# Patient Record
Sex: FEMALE | Race: WHITE | NOT HISPANIC OR LATINO | Employment: UNEMPLOYED | URBAN - METROPOLITAN AREA
[De-identification: names, ages, dates, MRNs, and addresses within clinical notes are randomized per-mention and may not be internally consistent; named-entity substitution may affect disease eponyms.]

---

## 2017-01-03 ENCOUNTER — COMMUNICATION - HEALTHEAST (OUTPATIENT)
Dept: PALLIATIVE MEDICINE | Facility: CLINIC | Age: 37
End: 2017-01-03

## 2017-01-09 ENCOUNTER — AMBULATORY - HEALTHEAST (OUTPATIENT)
Dept: PALLIATIVE MEDICINE | Facility: OTHER | Age: 37
End: 2017-01-09

## 2017-01-11 ENCOUNTER — OFFICE VISIT - HEALTHEAST (OUTPATIENT)
Dept: FAMILY MEDICINE | Facility: CLINIC | Age: 37
End: 2017-01-11

## 2017-01-11 ENCOUNTER — COMMUNICATION - HEALTHEAST (OUTPATIENT)
Dept: PALLIATIVE MEDICINE | Facility: OTHER | Age: 37
End: 2017-01-11

## 2017-01-11 DIAGNOSIS — J02.9 SORE THROAT: ICD-10-CM

## 2017-01-11 DIAGNOSIS — J01.90 ACUTE SINUSITIS: ICD-10-CM

## 2017-01-12 ENCOUNTER — COMMUNICATION - HEALTHEAST (OUTPATIENT)
Dept: PALLIATIVE MEDICINE | Facility: OTHER | Age: 37
End: 2017-01-12

## 2017-01-12 DIAGNOSIS — R52 PAIN: ICD-10-CM

## 2017-01-16 ENCOUNTER — COMMUNICATION - HEALTHEAST (OUTPATIENT)
Dept: PALLIATIVE MEDICINE | Facility: OTHER | Age: 37
End: 2017-01-16

## 2017-01-16 ENCOUNTER — HOSPITAL ENCOUNTER (OUTPATIENT)
Dept: PALLIATIVE MEDICINE | Facility: OTHER | Age: 37
Discharge: HOME | End: 2017-01-16
Attending: NURSE PRACTITIONER

## 2017-01-16 DIAGNOSIS — Z3A.31 31 WEEKS GESTATION OF PREGNANCY: ICD-10-CM

## 2017-01-16 DIAGNOSIS — R68.84 JAW PAIN: ICD-10-CM

## 2017-01-16 DIAGNOSIS — G89.4 CHRONIC PAIN SYNDROME: ICD-10-CM

## 2017-01-16 DIAGNOSIS — R52 PAIN: ICD-10-CM

## 2017-01-16 DIAGNOSIS — M27.9 DISORDER OF JAW: ICD-10-CM

## 2017-01-16 ASSESSMENT — MIFFLIN-ST. JEOR: SCORE: 1370.35

## 2017-01-24 ENCOUNTER — COMMUNICATION - HEALTHEAST (OUTPATIENT)
Dept: PALLIATIVE MEDICINE | Facility: OTHER | Age: 37
End: 2017-01-24

## 2017-01-25 ENCOUNTER — COMMUNICATION - HEALTHEAST (OUTPATIENT)
Dept: PALLIATIVE MEDICINE | Facility: OTHER | Age: 37
End: 2017-01-25

## 2017-01-26 ENCOUNTER — COMMUNICATION - HEALTHEAST (OUTPATIENT)
Dept: PALLIATIVE MEDICINE | Facility: OTHER | Age: 37
End: 2017-01-26

## 2017-01-26 DIAGNOSIS — R52 PAIN: ICD-10-CM

## 2017-02-09 ENCOUNTER — COMMUNICATION - HEALTHEAST (OUTPATIENT)
Dept: PALLIATIVE MEDICINE | Facility: OTHER | Age: 37
End: 2017-02-09

## 2017-02-09 DIAGNOSIS — R52 PAIN: ICD-10-CM

## 2017-02-16 ENCOUNTER — HOSPITAL ENCOUNTER (OUTPATIENT)
Dept: PALLIATIVE MEDICINE | Facility: OTHER | Age: 37
Discharge: HOME | End: 2017-02-16
Attending: NURSE PRACTITIONER

## 2017-02-16 DIAGNOSIS — M27.9 DISORDER OF JAW: ICD-10-CM

## 2017-02-16 DIAGNOSIS — O09.93 HIGH-RISK PREGNANCY, THIRD TRIMESTER: ICD-10-CM

## 2017-02-16 DIAGNOSIS — M54.50 LOW BACK PAIN: ICD-10-CM

## 2017-02-16 DIAGNOSIS — R68.84 JAW PAIN: ICD-10-CM

## 2017-02-16 DIAGNOSIS — R52 PAIN: ICD-10-CM

## 2017-02-16 DIAGNOSIS — G89.4 CHRONIC PAIN SYNDROME: ICD-10-CM

## 2017-02-16 ASSESSMENT — MIFFLIN-ST. JEOR: SCORE: 1379.42

## 2017-02-24 ENCOUNTER — COMMUNICATION - HEALTHEAST (OUTPATIENT)
Dept: PALLIATIVE MEDICINE | Facility: OTHER | Age: 37
End: 2017-02-24

## 2017-02-24 ENCOUNTER — AMBULATORY - HEALTHEAST (OUTPATIENT)
Dept: PALLIATIVE MEDICINE | Facility: OTHER | Age: 37
End: 2017-02-24

## 2017-02-25 ENCOUNTER — RECORDS - HEALTHEAST (OUTPATIENT)
Dept: ADMINISTRATIVE | Facility: OTHER | Age: 37
End: 2017-02-25

## 2017-02-26 ENCOUNTER — RECORDS - HEALTHEAST (OUTPATIENT)
Dept: ADMINISTRATIVE | Facility: OTHER | Age: 37
End: 2017-02-26

## 2017-02-27 ENCOUNTER — RECORDS - HEALTHEAST (OUTPATIENT)
Dept: ADMINISTRATIVE | Facility: OTHER | Age: 37
End: 2017-02-27

## 2017-03-01 ENCOUNTER — COMMUNICATION - HEALTHEAST (OUTPATIENT)
Dept: PALLIATIVE MEDICINE | Facility: OTHER | Age: 37
End: 2017-03-01

## 2017-03-01 DIAGNOSIS — N20.0 RENAL CALCULI: ICD-10-CM

## 2017-03-07 ENCOUNTER — COMMUNICATION - HEALTHEAST (OUTPATIENT)
Dept: PALLIATIVE MEDICINE | Facility: OTHER | Age: 37
End: 2017-03-07

## 2017-03-07 DIAGNOSIS — R52 PAIN: ICD-10-CM

## 2017-03-08 ENCOUNTER — RECORDS - HEALTHEAST (OUTPATIENT)
Dept: ADMINISTRATIVE | Facility: OTHER | Age: 37
End: 2017-03-08

## 2017-03-09 ENCOUNTER — RECORDS - HEALTHEAST (OUTPATIENT)
Dept: ADMINISTRATIVE | Facility: OTHER | Age: 37
End: 2017-03-09

## 2017-03-14 ENCOUNTER — COMMUNICATION - HEALTHEAST (OUTPATIENT)
Dept: PALLIATIVE MEDICINE | Facility: OTHER | Age: 37
End: 2017-03-14

## 2017-03-14 DIAGNOSIS — R52 PAIN: ICD-10-CM

## 2017-03-20 ENCOUNTER — COMMUNICATION - HEALTHEAST (OUTPATIENT)
Dept: PALLIATIVE MEDICINE | Facility: OTHER | Age: 37
End: 2017-03-20

## 2017-03-21 ENCOUNTER — HOSPITAL ENCOUNTER (OUTPATIENT)
Dept: PALLIATIVE MEDICINE | Facility: OTHER | Age: 37
Discharge: HOME | End: 2017-03-21
Attending: NURSE PRACTITIONER

## 2017-03-21 DIAGNOSIS — R68.84 JAW PAIN: ICD-10-CM

## 2017-03-21 DIAGNOSIS — R52 PAIN: ICD-10-CM

## 2017-03-21 DIAGNOSIS — G89.4 CHRONIC PAIN SYNDROME: ICD-10-CM

## 2017-03-21 ASSESSMENT — MIFFLIN-ST. JEOR: SCORE: 1306.84

## 2017-03-23 ENCOUNTER — COMMUNICATION - HEALTHEAST (OUTPATIENT)
Dept: PALLIATIVE MEDICINE | Facility: CLINIC | Age: 37
End: 2017-03-23

## 2017-04-10 ENCOUNTER — COMMUNICATION - HEALTHEAST (OUTPATIENT)
Dept: PALLIATIVE MEDICINE | Facility: OTHER | Age: 37
End: 2017-04-10

## 2017-04-10 ENCOUNTER — COMMUNICATION - HEALTHEAST (OUTPATIENT)
Dept: PALLIATIVE MEDICINE | Facility: CLINIC | Age: 37
End: 2017-04-10

## 2017-04-10 DIAGNOSIS — R52 PAIN: ICD-10-CM

## 2017-04-14 ENCOUNTER — COMMUNICATION - HEALTHEAST (OUTPATIENT)
Dept: PALLIATIVE MEDICINE | Facility: OTHER | Age: 37
End: 2017-04-14

## 2017-04-18 ENCOUNTER — COMMUNICATION - HEALTHEAST (OUTPATIENT)
Dept: FAMILY MEDICINE | Facility: CLINIC | Age: 37
End: 2017-04-18

## 2017-04-21 ENCOUNTER — HOSPITAL ENCOUNTER (OUTPATIENT)
Dept: PALLIATIVE MEDICINE | Facility: OTHER | Age: 37
Discharge: HOME | End: 2017-04-21
Attending: NURSE PRACTITIONER

## 2017-04-21 DIAGNOSIS — M27.9 DISORDER OF JAW: ICD-10-CM

## 2017-04-21 DIAGNOSIS — G89.4 CHRONIC PAIN SYNDROME: ICD-10-CM

## 2017-04-21 DIAGNOSIS — R52 PAIN: ICD-10-CM

## 2017-04-21 ASSESSMENT — MIFFLIN-ST. JEOR: SCORE: 1329.52

## 2017-05-08 ENCOUNTER — COMMUNICATION - HEALTHEAST (OUTPATIENT)
Dept: PALLIATIVE MEDICINE | Facility: OTHER | Age: 37
End: 2017-05-08

## 2017-05-15 ENCOUNTER — COMMUNICATION - HEALTHEAST (OUTPATIENT)
Dept: PALLIATIVE MEDICINE | Facility: OTHER | Age: 37
End: 2017-05-15

## 2017-05-20 ENCOUNTER — COMMUNICATION - HEALTHEAST (OUTPATIENT)
Dept: FAMILY MEDICINE | Facility: CLINIC | Age: 37
End: 2017-05-20

## 2017-05-26 ENCOUNTER — COMMUNICATION - HEALTHEAST (OUTPATIENT)
Dept: FAMILY MEDICINE | Facility: CLINIC | Age: 37
End: 2017-05-26

## 2017-06-07 ENCOUNTER — RECORDS - HEALTHEAST (OUTPATIENT)
Dept: ADMINISTRATIVE | Facility: OTHER | Age: 37
End: 2017-06-07

## 2017-06-09 ENCOUNTER — HOSPITAL ENCOUNTER (OUTPATIENT)
Dept: PALLIATIVE MEDICINE | Facility: OTHER | Age: 37
Discharge: HOME | End: 2017-06-09
Attending: NURSE PRACTITIONER

## 2017-06-09 ENCOUNTER — RECORDS - HEALTHEAST (OUTPATIENT)
Dept: LAB | Facility: HOSPITAL | Age: 37
End: 2017-06-09

## 2017-06-09 DIAGNOSIS — R52 PAIN: ICD-10-CM

## 2017-06-09 DIAGNOSIS — G89.4 CHRONIC PAIN SYNDROME: ICD-10-CM

## 2017-06-09 DIAGNOSIS — M27.9 DISORDER OF JAW: ICD-10-CM

## 2017-06-09 DIAGNOSIS — M54.50 LOW BACK PAIN: ICD-10-CM

## 2017-06-09 ASSESSMENT — MIFFLIN-ST. JEOR: SCORE: 1329.52

## 2017-06-12 LAB — HBV SURFACE AB SERPL IA-ACNC: POSITIVE M[IU]/ML

## 2017-06-13 ENCOUNTER — COMMUNICATION - HEALTHEAST (OUTPATIENT)
Dept: PALLIATIVE MEDICINE | Facility: OTHER | Age: 37
End: 2017-06-13

## 2017-06-13 ENCOUNTER — COMMUNICATION - HEALTHEAST (OUTPATIENT)
Dept: PALLIATIVE MEDICINE | Facility: CLINIC | Age: 37
End: 2017-06-13

## 2017-06-13 DIAGNOSIS — M26.609 TMJ (TEMPOROMANDIBULAR JOINT DISORDER): ICD-10-CM

## 2017-07-03 ENCOUNTER — COMMUNICATION - HEALTHEAST (OUTPATIENT)
Dept: PALLIATIVE MEDICINE | Facility: OTHER | Age: 37
End: 2017-07-03

## 2017-07-05 ENCOUNTER — COMMUNICATION - HEALTHEAST (OUTPATIENT)
Dept: PALLIATIVE MEDICINE | Facility: OTHER | Age: 37
End: 2017-07-05

## 2017-07-06 ENCOUNTER — COMMUNICATION - HEALTHEAST (OUTPATIENT)
Dept: FAMILY MEDICINE | Facility: CLINIC | Age: 37
End: 2017-07-06

## 2017-07-06 ENCOUNTER — COMMUNICATION - HEALTHEAST (OUTPATIENT)
Dept: PALLIATIVE MEDICINE | Facility: OTHER | Age: 37
End: 2017-07-06

## 2017-07-09 ENCOUNTER — COMMUNICATION - HEALTHEAST (OUTPATIENT)
Dept: PALLIATIVE MEDICINE | Facility: OTHER | Age: 37
End: 2017-07-09

## 2017-07-10 ENCOUNTER — COMMUNICATION - HEALTHEAST (OUTPATIENT)
Dept: PALLIATIVE MEDICINE | Facility: OTHER | Age: 37
End: 2017-07-10

## 2017-07-10 ENCOUNTER — COMMUNICATION - HEALTHEAST (OUTPATIENT)
Dept: FAMILY MEDICINE | Facility: CLINIC | Age: 37
End: 2017-07-10

## 2017-07-10 DIAGNOSIS — G89.29 CHRONIC PAIN: ICD-10-CM

## 2017-07-11 ENCOUNTER — COMMUNICATION - HEALTHEAST (OUTPATIENT)
Dept: PALLIATIVE MEDICINE | Facility: OTHER | Age: 37
End: 2017-07-11

## 2017-07-11 DIAGNOSIS — G89.29 CHRONIC PAIN: ICD-10-CM

## 2017-07-12 ENCOUNTER — COMMUNICATION - HEALTHEAST (OUTPATIENT)
Dept: PALLIATIVE MEDICINE | Facility: CLINIC | Age: 37
End: 2017-07-12

## 2017-07-31 ENCOUNTER — HOSPITAL ENCOUNTER (OUTPATIENT)
Dept: PALLIATIVE MEDICINE | Facility: OTHER | Age: 37
Discharge: HOME | End: 2017-07-31
Attending: NURSE PRACTITIONER

## 2017-07-31 DIAGNOSIS — R68.84 JAW PAIN: ICD-10-CM

## 2017-07-31 DIAGNOSIS — M54.50 LOW BACK PAIN: ICD-10-CM

## 2017-07-31 DIAGNOSIS — R52 PAIN: ICD-10-CM

## 2017-07-31 DIAGNOSIS — G89.4 CHRONIC PAIN SYNDROME: ICD-10-CM

## 2017-07-31 DIAGNOSIS — M27.9 DISORDER OF JAW: ICD-10-CM

## 2017-07-31 ASSESSMENT — MIFFLIN-ST. JEOR: SCORE: 1338.6

## 2017-08-04 ENCOUNTER — COMMUNICATION - HEALTHEAST (OUTPATIENT)
Dept: NURSING | Facility: CLINIC | Age: 37
End: 2017-08-04

## 2017-08-09 ENCOUNTER — OFFICE VISIT - HEALTHEAST (OUTPATIENT)
Dept: INTERNAL MEDICINE | Facility: CLINIC | Age: 37
End: 2017-08-09

## 2017-08-09 ENCOUNTER — COMMUNICATION - HEALTHEAST (OUTPATIENT)
Dept: SCHEDULING | Facility: CLINIC | Age: 37
End: 2017-08-09

## 2017-08-09 DIAGNOSIS — R19.7 DIARRHEA: ICD-10-CM

## 2017-08-10 ENCOUNTER — COMMUNICATION - HEALTHEAST (OUTPATIENT)
Dept: FAMILY MEDICINE | Facility: CLINIC | Age: 37
End: 2017-08-10

## 2017-08-11 ENCOUNTER — OFFICE VISIT - HEALTHEAST (OUTPATIENT)
Dept: FAMILY MEDICINE | Facility: CLINIC | Age: 37
End: 2017-08-11

## 2017-08-11 DIAGNOSIS — R19.7 DIARRHEA: ICD-10-CM

## 2017-08-11 DIAGNOSIS — K52.9 GASTROENTERITIS: ICD-10-CM

## 2017-09-05 ENCOUNTER — COMMUNICATION - HEALTHEAST (OUTPATIENT)
Dept: PALLIATIVE MEDICINE | Facility: CLINIC | Age: 37
End: 2017-09-05

## 2017-09-05 DIAGNOSIS — R52 PAIN: ICD-10-CM

## 2017-09-08 ENCOUNTER — COMMUNICATION - HEALTHEAST (OUTPATIENT)
Dept: PALLIATIVE MEDICINE | Facility: OTHER | Age: 37
End: 2017-09-08

## 2017-09-11 ENCOUNTER — COMMUNICATION - HEALTHEAST (OUTPATIENT)
Dept: FAMILY MEDICINE | Facility: CLINIC | Age: 37
End: 2017-09-11

## 2017-10-05 ENCOUNTER — HOSPITAL ENCOUNTER (OUTPATIENT)
Dept: PALLIATIVE MEDICINE | Facility: OTHER | Age: 37
Discharge: HOME | End: 2017-10-05
Attending: NURSE PRACTITIONER

## 2017-10-05 DIAGNOSIS — M27.9 DISORDER OF JAW: ICD-10-CM

## 2017-10-05 DIAGNOSIS — R52 PAIN: ICD-10-CM

## 2017-10-05 ASSESSMENT — MIFFLIN-ST. JEOR: SCORE: 1322.44

## 2017-10-26 ENCOUNTER — COMMUNICATION - HEALTHEAST (OUTPATIENT)
Dept: PALLIATIVE MEDICINE | Facility: OTHER | Age: 37
End: 2017-10-26

## 2017-11-08 ENCOUNTER — COMMUNICATION - HEALTHEAST (OUTPATIENT)
Dept: FAMILY MEDICINE | Facility: CLINIC | Age: 37
End: 2017-11-08

## 2017-11-08 DIAGNOSIS — G89.29 CHRONIC PAIN: ICD-10-CM

## 2017-11-09 ENCOUNTER — COMMUNICATION - HEALTHEAST (OUTPATIENT)
Dept: PALLIATIVE MEDICINE | Facility: CLINIC | Age: 37
End: 2017-11-09

## 2017-11-10 ENCOUNTER — OFFICE VISIT - HEALTHEAST (OUTPATIENT)
Dept: FAMILY MEDICINE | Facility: CLINIC | Age: 37
End: 2017-11-10

## 2017-11-10 DIAGNOSIS — G89.29 CHRONIC PAIN: ICD-10-CM

## 2017-11-10 DIAGNOSIS — F33.9 RECURRENT MAJOR DEPRESSION (H): ICD-10-CM

## 2017-11-10 DIAGNOSIS — F41.9 ANXIETY: ICD-10-CM

## 2017-11-10 DIAGNOSIS — K13.79 MOUTH PAIN: ICD-10-CM

## 2017-11-12 ENCOUNTER — COMMUNICATION - HEALTHEAST (OUTPATIENT)
Dept: FAMILY MEDICINE | Facility: CLINIC | Age: 37
End: 2017-11-12

## 2017-11-13 ENCOUNTER — HOSPITAL ENCOUNTER (OUTPATIENT)
Dept: PALLIATIVE MEDICINE | Facility: OTHER | Age: 37
Discharge: HOME | End: 2017-11-13
Attending: NURSE PRACTITIONER

## 2017-11-13 DIAGNOSIS — R68.84 JAW PAIN: ICD-10-CM

## 2017-11-13 ASSESSMENT — MIFFLIN-ST. JEOR: SCORE: 1326.69

## 2017-11-16 ENCOUNTER — RECORDS - HEALTHEAST (OUTPATIENT)
Dept: ADMINISTRATIVE | Facility: OTHER | Age: 37
End: 2017-11-16

## 2017-11-20 ENCOUNTER — COMMUNICATION - HEALTHEAST (OUTPATIENT)
Dept: PALLIATIVE MEDICINE | Facility: OTHER | Age: 37
End: 2017-11-20

## 2017-11-20 DIAGNOSIS — R68.84 JAW PAIN: ICD-10-CM

## 2017-11-22 ENCOUNTER — COMMUNICATION - HEALTHEAST (OUTPATIENT)
Dept: PALLIATIVE MEDICINE | Facility: OTHER | Age: 37
End: 2017-11-22

## 2017-11-27 ENCOUNTER — COMMUNICATION - HEALTHEAST (OUTPATIENT)
Dept: PALLIATIVE MEDICINE | Facility: CLINIC | Age: 37
End: 2017-11-27

## 2017-11-27 ENCOUNTER — COMMUNICATION - HEALTHEAST (OUTPATIENT)
Dept: PALLIATIVE MEDICINE | Facility: OTHER | Age: 37
End: 2017-11-27

## 2017-11-27 DIAGNOSIS — R68.84 JAW PAIN: ICD-10-CM

## 2017-12-04 ENCOUNTER — HOSPITAL ENCOUNTER (OUTPATIENT)
Dept: PALLIATIVE MEDICINE | Facility: OTHER | Age: 37
Discharge: HOME | End: 2017-12-04
Attending: NURSE PRACTITIONER

## 2017-12-04 DIAGNOSIS — R68.84 JAW PAIN: ICD-10-CM

## 2017-12-04 ASSESSMENT — MIFFLIN-ST. JEOR: SCORE: 1290.12

## 2017-12-11 ENCOUNTER — COMMUNICATION - HEALTHEAST (OUTPATIENT)
Dept: FAMILY MEDICINE | Facility: CLINIC | Age: 37
End: 2017-12-11

## 2017-12-13 ENCOUNTER — COMMUNICATION - HEALTHEAST (OUTPATIENT)
Dept: FAMILY MEDICINE | Facility: CLINIC | Age: 37
End: 2017-12-13

## 2017-12-21 ENCOUNTER — COMMUNICATION - HEALTHEAST (OUTPATIENT)
Dept: FAMILY MEDICINE | Facility: CLINIC | Age: 37
End: 2017-12-21

## 2017-12-21 DIAGNOSIS — R53.83 FATIGUE: ICD-10-CM

## 2017-12-22 ENCOUNTER — HOSPITAL ENCOUNTER (EMERGENCY)
Facility: CLINIC | Age: 37
Discharge: HOME | End: 2017-12-22
Attending: EMERGENCY MEDICINE | Admitting: EMERGENCY MEDICINE
Payer: COMMERCIAL

## 2017-12-22 ENCOUNTER — RECORDS - HEALTHEAST (OUTPATIENT)
Dept: ADMINISTRATIVE | Facility: OTHER | Age: 37
End: 2017-12-22

## 2017-12-22 ENCOUNTER — COMMUNICATION - HEALTHEAST (OUTPATIENT)
Dept: SCHEDULING | Facility: CLINIC | Age: 37
End: 2017-12-22

## 2017-12-22 VITALS
TEMPERATURE: 97.9 F | DIASTOLIC BLOOD PRESSURE: 77 MMHG | OXYGEN SATURATION: 98 % | HEART RATE: 85 BPM | RESPIRATION RATE: 20 BRPM | SYSTOLIC BLOOD PRESSURE: 118 MMHG

## 2017-12-22 DIAGNOSIS — O03.9 SPONTANEOUS MISCARRIAGE: ICD-10-CM

## 2017-12-22 PROCEDURE — 99283 EMERGENCY DEPT VISIT LOW MDM: CPT | Mod: Z6 | Performed by: EMERGENCY MEDICINE

## 2017-12-22 PROCEDURE — 25000132 ZZH RX MED GY IP 250 OP 250 PS 637: Performed by: EMERGENCY MEDICINE

## 2017-12-22 PROCEDURE — 96372 THER/PROPH/DIAG INJ SC/IM: CPT | Performed by: EMERGENCY MEDICINE

## 2017-12-22 PROCEDURE — 25000128 H RX IP 250 OP 636: Performed by: EMERGENCY MEDICINE

## 2017-12-22 PROCEDURE — 99284 EMERGENCY DEPT VISIT MOD MDM: CPT | Performed by: EMERGENCY MEDICINE

## 2017-12-22 RX ORDER — KETOROLAC TROMETHAMINE 30 MG/ML
60 INJECTION, SOLUTION INTRAMUSCULAR; INTRAVENOUS ONCE
Status: COMPLETED | OUTPATIENT
Start: 2017-12-22 | End: 2017-12-22

## 2017-12-22 RX ORDER — MISOPROSTOL 200 UG/1
600 TABLET ORAL ONCE
Status: COMPLETED | OUTPATIENT
Start: 2017-12-22 | End: 2017-12-22

## 2017-12-22 RX ADMIN — KETOROLAC TROMETHAMINE 60 MG: 30 INJECTION, SOLUTION INTRAMUSCULAR at 21:22

## 2017-12-22 RX ADMIN — MISOPROSTOL 600 MCG: 200 TABLET ORAL at 21:42

## 2017-12-22 NOTE — ED AVS SNAPSHOT
[facility] Emergency Department    [address] [address]    Phone:  [phone]    Fax:  [phone]                                       Lizzy Tom   MRN: [id number]    Department:  [facility] Emergency Department   Date of Visit:  12/22/2017           Patient Information     Date Of Birth          1980        Your diagnoses for this visit were:     Spontaneous miscarriage        You were seen by Mekhi Stringer DO.        Discharge Instructions       Monitor for vaginal bleeding  Return for heavy heavy bleeding or passage of large clots  No tampon use  Drink water to stay hydrated  Return tomorrow afternoon for recheck    24 Hour Appointment Hotline       To make an appointment at any [facility] clinic, call [phone] ([phone]). If you don't have a family doctor or clinic, we will help you find one. [facility] clinics are conveniently located to serve the needs of you and your family.             Review of your medicines      Notice     You have not been prescribed any medications.            Orders Needing Specimen Collection     None      Pending Results     No orders found from 12/20/2017 to 12/23/2017.            Pending Culture Results     No orders found from 12/20/2017 to 12/23/2017.            Pending Results Instructions     If you had any lab results that were not finalized at the time of your Discharge, you can call the ED Lab Result RN at [phone]. You will be contacted by this team for any positive Lab results or changes in treatment. The nurses are available 7 days a week from 10A to 6:30P.  You can leave a message 24 hours per day and they will return your call.        Test Results From Your Hospital Stay               Thank you for choosing [facility]       Thank you for choosing [facility] for your care. Our goal is always to provide you with excellent care. Hearing back from our patients is one way we can continue to improve our  "services. Please take a few minutes to complete the written survey that you may receive in the mail after you visit with us. Thank you!        [employer] Information     [employer] lets you send messages to your doctor, view your test results, renew your prescriptions, schedule appointments and more. To sign up, go to [email or web address] . Click on \"Log in\" on the left side of the screen, which will take you to the Welcome page. Then click on \"Sign up Now\" on the right side of the page.     You will be asked to enter the access code listed below, as well as some personal information. Please follow the directions to create your username and password.     Your access code is: [email or web address]  Expires: 3/22/2018 10:13 PM     Your access code will  in 90 days. If you need help or a new code, please call your [facility] clinic or [phone].        Care EveryWhere ID     This is your Care EveryWhere ID. This could be used by other organizations to access your [facility] medical records  [id number]        Equal Access to Services     [facility] Hospital[facility]OREN : Hadii prince Mclaughlin, waalma rosada lumiky, qaybta kaaljesus rivas, cat hernández. So [facility] [phone].    ATENCIÓN: Si habla español, tiene a sierra disposición servicios gratuitos de asistencia lingüística. [place] al [phone].    We comply with applicable federal civil rights laws and [place] laws. We do not discriminate on the basis of race, color, national origin, age, disability, sex, sexual orientation, or gender identity.            After Visit Summary       This is your record. Keep this with you and show to your community pharmacist(s) and doctor(s) at your next visit.                  "

## 2017-12-22 NOTE — ED AVS SNAPSHOT
[facility] Emergency Department    [address] [address]    Phone:  [phone]    Fax:  [phone]                                       Lizzy Tom   MRN: [id number]    Department:  [facility] Emergency Department   Date of Visit:  12/22/2017           After Visit Summary Signature Page     I have received my discharge instructions, and my questions have been answered. I have discussed any challenges I see with this plan with the nurse or doctor.    ..........................................................................................................................................  Patient/Patient Representative Signature      ..........................................................................................................................................  Patient Representative Print Name and Relationship to Patient    ..................................................               ................................................  Date                                            Time    ..........................................................................................................................................  Reviewed by Signature/Title    ...................................................              ..............................................  Date                                                            Time

## 2017-12-23 ENCOUNTER — HOSPITAL ENCOUNTER (EMERGENCY)
Facility: CLINIC | Age: 37
Discharge: HOME | End: 2017-12-23
Attending: EMERGENCY MEDICINE | Admitting: EMERGENCY MEDICINE
Payer: COMMERCIAL

## 2017-12-23 ENCOUNTER — RECORDS - HEALTHEAST (OUTPATIENT)
Dept: ADMINISTRATIVE | Facility: OTHER | Age: 37
End: 2017-12-23

## 2017-12-23 VITALS
RESPIRATION RATE: 18 BRPM | DIASTOLIC BLOOD PRESSURE: 59 MMHG | OXYGEN SATURATION: 100 % | TEMPERATURE: 97 F | SYSTOLIC BLOOD PRESSURE: 111 MMHG | WEIGHT: 130 LBS | HEART RATE: 57 BPM

## 2017-12-23 DIAGNOSIS — O03.9 SPONTANEOUS ABORTION IN FIRST TRIMESTER: ICD-10-CM

## 2017-12-23 PROCEDURE — 99212 OFFICE O/P EST SF 10 MIN: CPT | Performed by: EMERGENCY MEDICINE

## 2017-12-23 PROCEDURE — 99213 OFFICE O/P EST LOW 20 MIN: CPT | Mod: Z6 | Performed by: EMERGENCY MEDICINE

## 2017-12-23 NOTE — ED AVS SNAPSHOT
[facility] Emergency Department    [address] [address]    Phone:  [phone]    Fax:  [phone]                                       Lizzy Tom   MRN: [id number]    Department:  [facility] Emergency Department   Date of Visit:  2017           Patient Information     Date Of Birth          1980        Your diagnoses for this visit were:     Spontaneous  in first trimester        You were seen by Mekhi Stringer DO.        Discharge Instructions       Miscarriage appears to be complete  Continue monitoring for:( if these symptoms are noted please return for reassessment to the clinic or emergency department)  Fever  Increasing lower abdominal pain  Malodorous discharge from vagina  Heavy bleeding    24 Hour Appointment Hotline       To make an appointment at any [facility] clinic, call [phone] ([phone]). If you don't have a family doctor or clinic, we will help you find one. [facility] clinics are conveniently located to serve the needs of you and your family.             Review of your medicines      Notice     You have not been prescribed any medications.            Orders Needing Specimen Collection     None      Pending Results     No orders found from 2017 to 2017.            Pending Culture Results     No orders found from 2017 to 2017.            Pending Results Instructions     If you had any lab results that were not finalized at the time of your Discharge, you can call the ED Lab Result RN at [phone]. You will be contacted by this team for any positive Lab results or changes in treatment. The nurses are available 7 days a week from 10A to 6:30P.  You can leave a message 24 hours per day and they will return your call.        Test Results From Your Hospital Stay               Thank you for choosing [facility]       Thank you for choosing [facility] for your care. Our goal is always to provide you with  "excellent care. Hearing back from our patients is one way we can continue to improve our services. Please take a few minutes to complete the written survey that you may receive in the mail after you visit with us. Thank you!        [employer] Information     [employer] lets you send messages to your doctor, view your test results, renew your prescriptions, schedule appointments and more. To sign up, go to [email or web address] . Click on \"Log in\" on the left side of the screen, which will take you to the Welcome page. Then click on \"Sign up Now\" on the right side of the page.     You will be asked to enter the access code listed below, as well as some personal information. Please follow the directions to create your username and password.     Your access code is: [email or web address]  Expires: 3/22/2018 10:13 PM     Your access code will  in 90 days. If you need help or a new code, please call your [place] clinic or [phone].        Care EveryWhere ID     This is your Care EveryWhere ID. This could be used by other organizations to access your [place] medical records  [id number]        Equal Access to Services     ANJEL GRIGGS AH: Hadii prince Mclaughlin, waevan lucas, qaarnold kaalmaefraín rivas, cat hernández. So [facility] [phone].    ATENCIÓN: Si habla español, tiene a sierra disposición servicios gratuitos de asistencia lingüística. [place] al [phone].    We comply with applicable federal civil rights laws and [place] laws. We do not discriminate on the basis of race, color, national origin, age, disability, sex, sexual orientation, or gender identity.            After Visit Summary       This is your record. Keep this with you and show to your community pharmacist(s) and doctor(s) at your next visit.                  "

## 2017-12-23 NOTE — ED PROVIDER NOTES
History     Chief Complaint   Patient presents with     Miscarriage     recheck with dr posada     HPI         Lizzy Tom is a 37 year old female who  re-presents for reassessment of spontaneous /miscarriage.  Patient is not aware of her last menstrual period.  Pregnancy confirmed at home with UPT testing.  On oral contraceptive.  Patient arrived to the ED last evening after expulsion of placenta and fetus.  Estimated gestation was 12-13 weeks..    Patient presented with no hemorrhaging.  She was not orthostatic vital signs were stable.  Patient was treated with misoprostol 600 mcg oral dose.    Since discharge home she has had cramping shortly after the misoprostol which is now subsided.  She is only on her second hygiene pad and states she is not having any bleeding.  No fever.  No abdominal discomfort.      Problem List:    There are no active problems to display for this patient.       Past Medical History:    No past medical history on file.    Past Surgical History:    No past surgical history on file.    Family History:    No family history on file.    Social History:  Marital Status:  Single [1]  Social History   Substance Use Topics     Smoking status: Not on file     Smokeless tobacco: Not on file     Alcohol use Not on file        Medications:      No current outpatient prescriptions on file.      Review of Systems  All pertinent positives and negatives are documented in the HPI.  All others reviewed and are negative .    Physical Exam   BP: 111/59  Pulse: 57  Temp: 97  F (36.1  C)  Resp: 18  Weight: 59 kg (130 lb)  SpO2: 100 %      Physical Exam  Vital signs stable  Patient is not orthostatic  Skin color and tone is normal  Abdomen is nontender  Uterus palpates firm, nontender and measures only 8 cm.  Scant vaginal bleeding is noted.    ED Course     ED Course     Procedures                       Assessments & Plan (with Medical Decision Making)  Spontaneous AB 12-13 weeks gestation  estimated.  Medical recheck with patient doing well.  Patient given a list of symptoms to return be concerning for infection return of bleeding.  Eyes to follow-up with primary clinic provider in the next 2 weeks.  May restart oral contraceptive therapy in 7 days.    Patient was switched from ED status to urgent care status for billing.       I have reviewed the nursing notes.    I have reviewed the findings, diagnosis, plan and need for follow up with the patient.      New Prescriptions    No medications on file       Final diagnoses:   Spontaneous  in first trimester       2017   [facility] EMERGENCY DEPARTMENT     Mekhi Stringer,   17 1638

## 2017-12-23 NOTE — ED AVS SNAPSHOT
[facility] Emergency Department    [address] [address]    Phone:  [phone]    Fax:  [phone]                                       Lizzy Tom   MRN: [id number]    Department:  [facility] Emergency Department   Date of Visit:  12/23/2017           After Visit Summary Signature Page     I have received my discharge instructions, and my questions have been answered. I have discussed any challenges I see with this plan with the nurse or doctor.    ..........................................................................................................................................  Patient/Patient Representative Signature      ..........................................................................................................................................  Patient Representative Print Name and Relationship to Patient    ..................................................               ................................................  Date                                            Time    ..........................................................................................................................................  Reviewed by Signature/Title    ...................................................              ..............................................  Date                                                            Time

## 2017-12-23 NOTE — ED PROVIDER NOTES
History     Chief Complaint   Patient presents with     Vaginal Bleeding     HPI  Lizzy Tom is a 37 year old female    002  Presents with abdominal cramping, passage of products of conception.  Brought expelled POC in for inspection.  Unknown last menstruation  Has been on oral contraceptive  Irregular menses  Cramping resolved with passing POC  No fever  Small amount of continued vaginal bleeding  Not lightheaded or dizzy  No shortness of breath    Problem List:    There are no active problems to display for this patient.       Past Medical History:    No past medical history on file.    Past Surgical History:    No past surgical history on file.    Family History:    No family history on file.    Social History:  Marital Status:    Social History   Substance Use Topics     Smoking status: Not on file     Smokeless tobacco: Not on file     Alcohol use Not on file        Medications:      No current outpatient prescriptions on file.      Review of Systems  All pertinent positives and negatives are documented in the HPI.  All others reviewed and are negative .    Physical Exam   BP: 118/77  Pulse: 85  Temp: 97.9  F (36.6  C)  Resp: 20  SpO2: 98 %      Physical Exam  Vital signs reviewed  Patient not orthostatic  Skin color tone looks normal  Lungs are clear to auscultation  Heart is regular without ectopy or murmur  Abdomen: No distention.  Nontender.  No suprapubic tenderness.  Pelvic: No uterine pain.  Palpates 11 cm.  Cervix is dilated to 2 cm.  Small clots in the central cervix.  Scant bleeding.  No signs for any residual product of conception.  Suctioned out small clot.    ED Course     ED Course     Procedures  Contents of bag brought in by patient:  Inspection shows product of conception.  Placental tissue.  Fetus measuring 2.5 cm.  The placental tissue margins appear to be all intact and accounted for                       Assessments & Plan (with Medical Decision Making)  37-year-old   female presents with known pregnancy.  Pregnancy test was +1 week ago.  No ideas to last LMP.  Has been on oral contraceptive.  Menstruations are very irregular.  Prior to arrival expelled product of conception.  Present with scant vaginal bleeding and no abdominal cramping or pain.  Patient is not orthostatic/ vital signs stable.  Patient is afebrile.  No fever.  No purulent discharge.  Suspect complete first trimester miscarriage/spontaneous AB.  Plan:   Misoprostol 600 mcg single oral dose.  Recheck ED 24 hours.  Return sooner if she has any heavy bleeding.  Treating with misoprostol versus expectant/observational management has been shown to reduce hospitalization/surgical care needs.   10:16 PM  No further vaginal bleeding  Discharge home     I have reviewed the nursing notes.    I have reviewed the findings, diagnosis, plan and need for follow up with the patient.      New Prescriptions    No medications on file       Final diagnoses:   Spontaneous miscarriage       12/22/2017   [facility] EMERGENCY DEPARTMENT     Mekhi Stringer DO  12/22/17 1377

## 2017-12-23 NOTE — DISCHARGE INSTRUCTIONS
Monitor for vaginal bleeding  Return for heavy heavy bleeding or passage of large clots  No tampon use  Drink water to stay hydrated  Return tomorrow afternoon for recheck

## 2017-12-23 NOTE — DISCHARGE INSTRUCTIONS
Miscarriage appears to be complete  Continue monitoring for:( if these symptoms are noted please return for reassessment to the clinic or emergency department)  Fever  Increasing lower abdominal pain  Malodorous discharge from vagina  Heavy bleeding

## 2018-01-02 ENCOUNTER — COMMUNICATION - HEALTHEAST (OUTPATIENT)
Dept: PALLIATIVE MEDICINE | Facility: OTHER | Age: 38
End: 2018-01-02

## 2018-01-02 DIAGNOSIS — R68.84 JAW PAIN: ICD-10-CM

## 2018-01-04 ENCOUNTER — HOSPITAL ENCOUNTER (OUTPATIENT)
Dept: PALLIATIVE MEDICINE | Facility: OTHER | Age: 38
Discharge: HOME | End: 2018-01-04
Attending: NURSE PRACTITIONER

## 2018-01-04 DIAGNOSIS — R68.84 JAW PAIN: ICD-10-CM

## 2018-01-04 ASSESSMENT — MIFFLIN-ST. JEOR: SCORE: 1290.12

## 2018-01-12 ENCOUNTER — COMMUNICATION - HEALTHEAST (OUTPATIENT)
Dept: PALLIATIVE MEDICINE | Facility: OTHER | Age: 38
End: 2018-01-12

## 2018-01-12 DIAGNOSIS — R68.84 JAW PAIN: ICD-10-CM

## 2018-01-18 ENCOUNTER — COMMUNICATION - HEALTHEAST (OUTPATIENT)
Dept: PALLIATIVE MEDICINE | Facility: CLINIC | Age: 38
End: 2018-01-18

## 2018-01-18 ENCOUNTER — COMMUNICATION - HEALTHEAST (OUTPATIENT)
Dept: PALLIATIVE MEDICINE | Facility: OTHER | Age: 38
End: 2018-01-18

## 2018-01-18 DIAGNOSIS — R68.84 JAW PAIN: ICD-10-CM

## 2018-02-02 ENCOUNTER — COMMUNICATION - HEALTHEAST (OUTPATIENT)
Dept: PALLIATIVE MEDICINE | Facility: OTHER | Age: 38
End: 2018-02-02

## 2018-02-02 DIAGNOSIS — R68.84 JAW PAIN: ICD-10-CM

## 2018-02-09 ENCOUNTER — COMMUNICATION - HEALTHEAST (OUTPATIENT)
Dept: PALLIATIVE MEDICINE | Facility: OTHER | Age: 38
End: 2018-02-09

## 2018-02-09 ENCOUNTER — HOSPITAL ENCOUNTER (OUTPATIENT)
Dept: PALLIATIVE MEDICINE | Facility: OTHER | Age: 38
Discharge: HOME | End: 2018-02-09
Attending: NURSE PRACTITIONER

## 2018-02-09 DIAGNOSIS — R68.84 JAW PAIN: ICD-10-CM

## 2018-02-09 ASSESSMENT — MIFFLIN-ST. JEOR: SCORE: 1290.12

## 2018-02-13 ENCOUNTER — COMMUNICATION - HEALTHEAST (OUTPATIENT)
Dept: PALLIATIVE MEDICINE | Facility: OTHER | Age: 38
End: 2018-02-13

## 2018-02-22 ENCOUNTER — COMMUNICATION - HEALTHEAST (OUTPATIENT)
Dept: PALLIATIVE MEDICINE | Facility: OTHER | Age: 38
End: 2018-02-22

## 2018-02-22 DIAGNOSIS — R68.84 JAW PAIN: ICD-10-CM

## 2018-03-12 ENCOUNTER — HOSPITAL ENCOUNTER (OUTPATIENT)
Dept: PALLIATIVE MEDICINE | Facility: OTHER | Age: 38
Discharge: HOME | End: 2018-03-12
Attending: NURSE PRACTITIONER

## 2018-03-12 DIAGNOSIS — R68.84 JAW PAIN: ICD-10-CM

## 2018-03-12 DIAGNOSIS — M79.10 MYALGIA: ICD-10-CM

## 2018-03-12 DIAGNOSIS — M26.609 TMJ (TEMPOROMANDIBULAR JOINT DISORDER): ICD-10-CM

## 2018-03-12 DIAGNOSIS — K08.89 CHEWING DIFFICULTY: ICD-10-CM

## 2018-03-12 DIAGNOSIS — R51.9 HEAD AND FACE PAIN: ICD-10-CM

## 2018-03-12 ASSESSMENT — MIFFLIN-ST. JEOR: SCORE: 1290.12

## 2018-03-30 ENCOUNTER — COMMUNICATION - HEALTHEAST (OUTPATIENT)
Dept: PALLIATIVE MEDICINE | Facility: OTHER | Age: 38
End: 2018-03-30

## 2018-04-03 ENCOUNTER — RECORDS - HEALTHEAST (OUTPATIENT)
Dept: LAB | Facility: CLINIC | Age: 38
End: 2018-04-03

## 2018-04-03 ENCOUNTER — RECORDS - HEALTHEAST (OUTPATIENT)
Dept: ADMINISTRATIVE | Facility: OTHER | Age: 38
End: 2018-04-03

## 2018-04-03 LAB
AMPHETAMINES UR QL SCN: ABNORMAL
BARBITURATES UR QL: ABNORMAL
BENZODIAZ UR QL: ABNORMAL
CANNABINOIDS UR QL SCN: ABNORMAL
COCAINE UR QL: ABNORMAL
CREAT UR-MCNC: 93.4 MG/DL
METHADONE UR QL SCN: ABNORMAL
OPIATES UR QL SCN: ABNORMAL
OXYCODONE UR QL: ABNORMAL
PCP UR QL SCN: ABNORMAL

## 2018-04-04 ENCOUNTER — COMMUNICATION - HEALTHEAST (OUTPATIENT)
Dept: PALLIATIVE MEDICINE | Facility: OTHER | Age: 38
End: 2018-04-04

## 2018-04-04 DIAGNOSIS — R68.84 JAW PAIN: ICD-10-CM

## 2018-04-04 LAB
HBV SURFACE AB SERPL IA-ACNC: POSITIVE M[IU]/ML
RUBV IGG SERPL QL IA: POSITIVE
VZV IGG SER QL IA: POSITIVE

## 2018-04-05 ENCOUNTER — COMMUNICATION - HEALTHEAST (OUTPATIENT)
Dept: FAMILY MEDICINE | Facility: CLINIC | Age: 38
End: 2018-04-05

## 2018-04-05 ENCOUNTER — COMMUNICATION - HEALTHEAST (OUTPATIENT)
Dept: PALLIATIVE MEDICINE | Facility: OTHER | Age: 38
End: 2018-04-05

## 2018-04-09 ENCOUNTER — COMMUNICATION - HEALTHEAST (OUTPATIENT)
Dept: PALLIATIVE MEDICINE | Facility: OTHER | Age: 38
End: 2018-04-09

## 2018-04-09 DIAGNOSIS — R68.84 JAW PAIN: ICD-10-CM

## 2018-04-16 ENCOUNTER — COMMUNICATION - HEALTHEAST (OUTPATIENT)
Dept: PALLIATIVE MEDICINE | Facility: OTHER | Age: 38
End: 2018-04-16

## 2018-04-20 ENCOUNTER — COMMUNICATION - HEALTHEAST (OUTPATIENT)
Dept: PALLIATIVE MEDICINE | Facility: OTHER | Age: 38
End: 2018-04-20

## 2018-04-20 DIAGNOSIS — R68.84 JAW PAIN: ICD-10-CM

## 2018-04-30 ENCOUNTER — OFFICE VISIT - HEALTHEAST (OUTPATIENT)
Dept: FAMILY MEDICINE | Facility: CLINIC | Age: 38
End: 2018-04-30

## 2018-04-30 DIAGNOSIS — K13.79 MOUTH PAIN: ICD-10-CM

## 2018-04-30 DIAGNOSIS — G89.29 CHRONIC TMJ PAIN: ICD-10-CM

## 2018-04-30 DIAGNOSIS — K08.89 DIFFICULTY CHEWING: ICD-10-CM

## 2018-04-30 DIAGNOSIS — F11.20 CHRONIC NARCOTIC DEPENDENCE (H): ICD-10-CM

## 2018-04-30 DIAGNOSIS — S69.91XA INJURY OF FINGER OF RIGHT HAND, INITIAL ENCOUNTER: ICD-10-CM

## 2018-04-30 DIAGNOSIS — G89.29 CHRONIC PAIN: ICD-10-CM

## 2018-04-30 DIAGNOSIS — M26.629 CHRONIC TMJ PAIN: ICD-10-CM

## 2018-04-30 DIAGNOSIS — R68.84 JAW PAIN: ICD-10-CM

## 2018-04-30 ASSESSMENT — MIFFLIN-ST. JEOR: SCORE: 1279.91

## 2018-05-25 ENCOUNTER — OFFICE VISIT - HEALTHEAST (OUTPATIENT)
Dept: FAMILY MEDICINE | Facility: CLINIC | Age: 38
End: 2018-05-25

## 2018-05-25 DIAGNOSIS — R59.1 LYMPHADENOPATHY OF HEAD AND NECK: ICD-10-CM
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
## 2018-05-25 DIAGNOSIS — G89.29 CHRONIC TMJ PAIN: ICD-10-CM

## 2018-05-25 DIAGNOSIS — F33.9 RECURRENT MAJOR DEPRESSION (H): ICD-10-CM

## 2018-05-25 DIAGNOSIS — R68.84 JAW PAIN: ICD-10-CM

## 2018-05-25 DIAGNOSIS — M26.629 CHRONIC TMJ PAIN: ICD-10-CM

## 2018-05-25 LAB
ERYTHROCYTE [DISTWIDTH] IN BLOOD BY AUTOMATED COUNT: 12.8 % (ref 11–14.5)
HCT VFR BLD AUTO: 41 % (ref 35–47)
HGB BLD-MCNC: 13.8 G/DL (ref 12–16)
MCH RBC QN AUTO: 29.1 PG (ref 27–34)
MCHC RBC AUTO-ENTMCNC: 33.7 G/DL (ref 32–36)
MCV RBC AUTO: 86 FL (ref 80–100)
PLATELET # BLD AUTO: 283 THOU/UL (ref 140–440)
PMV BLD AUTO: 7.4 FL (ref 7–10)
RBC # BLD AUTO: 4.75 MILL/UL (ref 3.8–5.4)
WBC: 9.7 THOU/UL (ref 4–11)

## 2018-05-29 LAB — B BURGDOR IGG+IGM SER QL: 0.06 INDEX VALUE

## 2018-06-23 ENCOUNTER — COMMUNICATION - HEALTHEAST (OUTPATIENT)
Dept: FAMILY MEDICINE | Facility: CLINIC | Age: 38
End: 2018-06-23

## 2018-06-27 ENCOUNTER — COMMUNICATION - HEALTHEAST (OUTPATIENT)
Dept: FAMILY MEDICINE | Facility: CLINIC | Age: 38
End: 2018-06-27

## 2018-07-02 ENCOUNTER — COMMUNICATION - HEALTHEAST (OUTPATIENT)
Dept: PALLIATIVE MEDICINE | Facility: CLINIC | Age: 38
End: 2018-07-02

## 2018-07-21 ENCOUNTER — COMMUNICATION - HEALTHEAST (OUTPATIENT)
Dept: FAMILY MEDICINE | Facility: CLINIC | Age: 38
End: 2018-07-21

## 2018-08-13 ENCOUNTER — RECORDS - HEALTHEAST (OUTPATIENT)
Dept: ADMINISTRATIVE | Facility: OTHER | Age: 38
End: 2018-08-13

## 2018-08-14 ENCOUNTER — RECORDS - HEALTHEAST (OUTPATIENT)
Dept: ADMINISTRATIVE | Facility: OTHER | Age: 38
End: 2018-08-14

## 2018-09-21 ENCOUNTER — RECORDS - HEALTHEAST (OUTPATIENT)
Dept: ADMINISTRATIVE | Facility: OTHER | Age: 38
End: 2018-09-21

## 2018-09-22 ENCOUNTER — RECORDS - HEALTHEAST (OUTPATIENT)
Dept: LAB | Facility: CLINIC | Age: 38
End: 2018-09-22

## 2018-09-23 ENCOUNTER — RECORDS - HEALTHEAST (OUTPATIENT)
Dept: LAB | Facility: CLINIC | Age: 38
End: 2018-09-23

## 2018-09-23 LAB
SHIGA TOXIN 1: NEGATIVE
SHIGA TOXIN 2: NEGATIVE
WBC STL QL MICRO: NORMAL

## 2018-09-24 LAB
BACTERIA SPEC CULT: ABNORMAL
BACTERIA SPEC CULT: ABNORMAL
O+P STL MICRO: NORMAL

## 2018-09-26 LAB — BACTERIA SPEC CULT: ABNORMAL

## 2018-10-08 ENCOUNTER — RECORDS - HEALTHEAST (OUTPATIENT)
Dept: ADMINISTRATIVE | Facility: OTHER | Age: 38
End: 2018-10-08

## 2018-11-26 ENCOUNTER — RECORDS - HEALTHEAST (OUTPATIENT)
Dept: ADMINISTRATIVE | Facility: OTHER | Age: 38
End: 2018-11-26

## 2018-12-07 ENCOUNTER — RECORDS - HEALTHEAST (OUTPATIENT)
Dept: ADMINISTRATIVE | Facility: OTHER | Age: 38
End: 2018-12-07

## 2018-12-19 ENCOUNTER — RECORDS - HEALTHEAST (OUTPATIENT)
Dept: ADMINISTRATIVE | Facility: OTHER | Age: 38
End: 2018-12-19

## 2018-12-21 ENCOUNTER — RECORDS - HEALTHEAST (OUTPATIENT)
Dept: ADMINISTRATIVE | Facility: OTHER | Age: 38
End: 2018-12-21

## 2019-01-04 ENCOUNTER — RECORDS - HEALTHEAST (OUTPATIENT)
Dept: ADMINISTRATIVE | Facility: OTHER | Age: 39
End: 2019-01-04

## 2019-01-18 ENCOUNTER — RECORDS - HEALTHEAST (OUTPATIENT)
Dept: ADMINISTRATIVE | Facility: OTHER | Age: 39
End: 2019-01-18

## 2019-02-13 ENCOUNTER — RECORDS - HEALTHEAST (OUTPATIENT)
Dept: ADMINISTRATIVE | Facility: OTHER | Age: 39
End: 2019-02-13

## 2019-02-28 ENCOUNTER — RECORDS - HEALTHEAST (OUTPATIENT)
Dept: ADMINISTRATIVE | Facility: OTHER | Age: 39
End: 2019-02-28

## 2019-03-22 ENCOUNTER — RECORDS - HEALTHEAST (OUTPATIENT)
Dept: ADMINISTRATIVE | Facility: OTHER | Age: 39
End: 2019-03-22

## 2019-04-08 ENCOUNTER — RECORDS - HEALTHEAST (OUTPATIENT)
Dept: ADMINISTRATIVE | Facility: OTHER | Age: 39
End: 2019-04-08

## 2019-04-13 ENCOUNTER — COMMUNICATION - HEALTHEAST (OUTPATIENT)
Dept: FAMILY MEDICINE | Facility: CLINIC | Age: 39
End: 2019-04-13

## 2019-04-17 ENCOUNTER — COMMUNICATION - HEALTHEAST (OUTPATIENT)
Dept: FAMILY MEDICINE | Facility: CLINIC | Age: 39
End: 2019-04-17

## 2019-04-17 ENCOUNTER — OFFICE VISIT - HEALTHEAST (OUTPATIENT)
Dept: FAMILY MEDICINE | Facility: CLINIC | Age: 39
End: 2019-04-17

## 2019-04-17 DIAGNOSIS — M25.50 POLYARTHRALGIA: ICD-10-CM

## 2019-04-17 DIAGNOSIS — L21.9 SEBORRHEIC DERMATITIS OF SCALP: ICD-10-CM

## 2019-04-17 DIAGNOSIS — G89.29 OTHER CHRONIC PAIN: ICD-10-CM

## 2019-04-18 ENCOUNTER — COMMUNICATION - HEALTHEAST (OUTPATIENT)
Dept: FAMILY MEDICINE | Facility: CLINIC | Age: 39
End: 2019-04-18

## 2019-04-19 ENCOUNTER — COMMUNICATION - HEALTHEAST (OUTPATIENT)
Dept: FAMILY MEDICINE | Facility: CLINIC | Age: 39
End: 2019-04-19

## 2019-04-19 DIAGNOSIS — R52 PAIN: ICD-10-CM

## 2019-05-03 ENCOUNTER — OFFICE VISIT - HEALTHEAST (OUTPATIENT)
Dept: FAMILY MEDICINE | Facility: CLINIC | Age: 39
End: 2019-05-03

## 2019-05-03 ENCOUNTER — COMMUNICATION - HEALTHEAST (OUTPATIENT)
Dept: FAMILY MEDICINE | Facility: CLINIC | Age: 39
End: 2019-05-03

## 2019-05-03 DIAGNOSIS — M35.3 POLYMYALGIA (H): ICD-10-CM

## 2019-05-03 DIAGNOSIS — Z30.8 ENCOUNTER FOR OTHER CONTRACEPTIVE MANAGEMENT: ICD-10-CM

## 2019-05-03 DIAGNOSIS — M35.7 HYPERMOBILITY SYNDROME: ICD-10-CM

## 2019-05-03 DIAGNOSIS — M25.50 ARTHRALGIA OF MULTIPLE JOINTS: ICD-10-CM

## 2019-05-07 ENCOUNTER — TELEPHONE (OUTPATIENT)
Dept: ONCOLOGY | Facility: CLINIC | Age: 39
End: 2019-05-07

## 2019-05-07 NOTE — TELEPHONE ENCOUNTER
ONCOLOGY INTAKE: Records Information      APPT INFORMATION:  Referring provider:  Dr. Oma Fuentes  Referring provider s clinic:  [facility]  Reason for visit/diagnosis:  Hypermobility syndrome, Polymyalgia and Arthralgia of multiple joints-being referred for Genetic Counseling  RECORDS INFORMATION:  Were the records received with the referral (via [employer])? Yes      ADDITIONAL INFORMATION:  Fax records upon kavin

## 2019-05-09 ENCOUNTER — OFFICE VISIT - HEALTHEAST (OUTPATIENT)
Dept: FAMILY MEDICINE | Facility: CLINIC | Age: 39
End: 2019-05-09

## 2019-05-09 DIAGNOSIS — J01.00 ACUTE MAXILLARY SINUSITIS, RECURRENCE NOT SPECIFIED: ICD-10-CM

## 2019-05-09 DIAGNOSIS — Z92.89 HISTORY OF DENTAL SURGERY: ICD-10-CM

## 2019-05-13 ENCOUNTER — COMMUNICATION - HEALTHEAST (OUTPATIENT)
Dept: FAMILY MEDICINE | Facility: CLINIC | Age: 39
End: 2019-05-13

## 2019-05-15 ENCOUNTER — COMMUNICATION - HEALTHEAST (OUTPATIENT)
Dept: FAMILY MEDICINE | Facility: CLINIC | Age: 39
End: 2019-05-15

## 2019-05-15 DIAGNOSIS — R52 PAIN: ICD-10-CM

## 2019-06-10 ENCOUNTER — COMMUNICATION - HEALTHEAST (OUTPATIENT)
Dept: FAMILY MEDICINE | Facility: CLINIC | Age: 39
End: 2019-06-10

## 2019-06-10 DIAGNOSIS — R52 PAIN: ICD-10-CM

## 2019-06-26 ENCOUNTER — COMMUNICATION - HEALTHEAST (OUTPATIENT)
Dept: FAMILY MEDICINE | Facility: CLINIC | Age: 39
End: 2019-06-26

## 2019-07-08 ENCOUNTER — COMMUNICATION - HEALTHEAST (OUTPATIENT)
Dept: FAMILY MEDICINE | Facility: CLINIC | Age: 39
End: 2019-07-08

## 2019-07-10 ENCOUNTER — OFFICE VISIT - HEALTHEAST (OUTPATIENT)
Dept: FAMILY MEDICINE | Facility: CLINIC | Age: 39
End: 2019-07-10

## 2019-07-10 DIAGNOSIS — K04.7 DENTAL INFECTION: ICD-10-CM

## 2019-07-10 DIAGNOSIS — R52 PAIN: ICD-10-CM

## 2019-07-10 LAB
ERYTHROCYTE [DISTWIDTH] IN BLOOD BY AUTOMATED COUNT: 12.3 % (ref 11–14.5)
HCT VFR BLD AUTO: 41.4 % (ref 35–47)
HGB BLD-MCNC: 13.8 G/DL (ref 12–16)
MCH RBC QN AUTO: 29.7 PG (ref 27–34)
MCHC RBC AUTO-ENTMCNC: 33.3 G/DL (ref 32–36)
MCV RBC AUTO: 89 FL (ref 80–100)
PLATELET # BLD AUTO: 264 THOU/UL (ref 140–440)
PMV BLD AUTO: 7.6 FL (ref 7–10)
RBC # BLD AUTO: 4.65 MILL/UL (ref 3.8–5.4)
WBC: 9.3 THOU/UL (ref 4–11)

## 2019-07-10 ASSESSMENT — MIFFLIN-ST. JEOR: SCORE: 1342.27

## 2019-07-29 ENCOUNTER — COMMUNICATION - HEALTHEAST (OUTPATIENT)
Dept: FAMILY MEDICINE | Facility: CLINIC | Age: 39
End: 2019-07-29

## 2019-08-01 ENCOUNTER — OFFICE VISIT - HEALTHEAST (OUTPATIENT)
Dept: FAMILY MEDICINE | Facility: CLINIC | Age: 39
End: 2019-08-01

## 2019-08-01 DIAGNOSIS — R19.7 DIARRHEA, UNSPECIFIED TYPE: ICD-10-CM

## 2019-08-01 DIAGNOSIS — G89.29 OTHER CHRONIC PAIN: ICD-10-CM

## 2019-08-01 DIAGNOSIS — J06.9 UPPER RESPIRATORY TRACT INFECTION, UNSPECIFIED TYPE: ICD-10-CM

## 2019-08-01 DIAGNOSIS — R05.9 COUGH: ICD-10-CM

## 2019-08-01 LAB
ALBUMIN SERPL-MCNC: 3.8 G/DL (ref 3.5–5)
ALP SERPL-CCNC: 95 U/L (ref 45–120)
ALT SERPL W P-5'-P-CCNC: 12 U/L (ref 0–45)
ANION GAP SERPL CALCULATED.3IONS-SCNC: 8 MMOL/L (ref 5–18)
AST SERPL W P-5'-P-CCNC: 10 U/L (ref 0–40)
BILIRUB SERPL-MCNC: 0.4 MG/DL (ref 0–1)
BUN SERPL-MCNC: 12 MG/DL (ref 8–22)
CALCIUM SERPL-MCNC: 9 MG/DL (ref 8.5–10.5)
CHLORIDE BLD-SCNC: 108 MMOL/L (ref 98–107)
CO2 SERPL-SCNC: 24 MMOL/L (ref 22–31)
CREAT SERPL-MCNC: 0.71 MG/DL (ref 0.6–1.1)
ERYTHROCYTE [DISTWIDTH] IN BLOOD BY AUTOMATED COUNT: 12.5 % (ref 11–14.5)
GFR SERPL CREATININE-BSD FRML MDRD: >60 ML/MIN/1.73M2
GLUCOSE BLD-MCNC: 81 MG/DL (ref 70–125)
HCT VFR BLD AUTO: 42.6 % (ref 35–47)
HGB BLD-MCNC: 14.3 G/DL (ref 12–16)
MCH RBC QN AUTO: 29.3 PG (ref 27–34)
MCHC RBC AUTO-ENTMCNC: 33.5 G/DL (ref 32–36)
MCV RBC AUTO: 87 FL (ref 80–100)
PLATELET # BLD AUTO: 262 THOU/UL (ref 140–440)
PMV BLD AUTO: 7.7 FL (ref 7–10)
POTASSIUM BLD-SCNC: 3.6 MMOL/L (ref 3.5–5)
PROT SERPL-MCNC: 6.7 G/DL (ref 6–8)
RBC # BLD AUTO: 4.88 MILL/UL (ref 3.8–5.4)
SODIUM SERPL-SCNC: 140 MMOL/L (ref 136–145)
WBC: 7.5 THOU/UL (ref 4–11)

## 2019-08-01 ASSESSMENT — MIFFLIN-ST. JEOR: SCORE: 1315.34

## 2019-08-02 ENCOUNTER — COMMUNICATION - HEALTHEAST (OUTPATIENT)
Dept: FAMILY MEDICINE | Facility: CLINIC | Age: 39
End: 2019-08-02

## 2019-08-05 ENCOUNTER — AMBULATORY - HEALTHEAST (OUTPATIENT)
Dept: LAB | Facility: CLINIC | Age: 39
End: 2019-08-05

## 2019-08-05 DIAGNOSIS — R19.7 DIARRHEA, UNSPECIFIED TYPE: ICD-10-CM

## 2019-08-05 LAB
C DIFF TOX B STL QL: NEGATIVE
RIBOTYPE 027/NAP1/BI: NORMAL

## 2019-08-06 LAB
SHIGA TOXIN 1: NEGATIVE
SHIGA TOXIN 2: NEGATIVE

## 2019-08-07 ENCOUNTER — COMMUNICATION - HEALTHEAST (OUTPATIENT)
Dept: FAMILY MEDICINE | Facility: CLINIC | Age: 39
End: 2019-08-07

## 2019-08-07 DIAGNOSIS — R52 PAIN: ICD-10-CM

## 2019-08-08 LAB — BACTERIA SPEC CULT: NORMAL

## 2019-08-09 ENCOUNTER — COMMUNICATION - HEALTHEAST (OUTPATIENT)
Dept: FAMILY MEDICINE | Facility: CLINIC | Age: 39
End: 2019-08-09

## 2019-08-22 ENCOUNTER — COMMUNICATION - HEALTHEAST (OUTPATIENT)
Dept: FAMILY MEDICINE | Facility: CLINIC | Age: 39
End: 2019-08-22

## 2019-08-23 ENCOUNTER — COMMUNICATION - HEALTHEAST (OUTPATIENT)
Dept: FAMILY MEDICINE | Facility: CLINIC | Age: 39
End: 2019-08-23

## 2019-08-23 DIAGNOSIS — G89.29 OTHER CHRONIC PAIN: ICD-10-CM

## 2019-09-03 ENCOUNTER — COMMUNICATION - HEALTHEAST (OUTPATIENT)
Dept: FAMILY MEDICINE | Facility: CLINIC | Age: 39
End: 2019-09-03

## 2019-09-03 DIAGNOSIS — R52 PAIN: ICD-10-CM

## 2019-09-04 ENCOUNTER — COMMUNICATION - HEALTHEAST (OUTPATIENT)
Dept: SCHEDULING | Facility: CLINIC | Age: 39
End: 2019-09-04

## 2019-09-04 ENCOUNTER — COMMUNICATION - HEALTHEAST (OUTPATIENT)
Dept: FAMILY MEDICINE | Facility: CLINIC | Age: 39
End: 2019-09-04

## 2019-09-04 DIAGNOSIS — R52 PAIN: ICD-10-CM

## 2019-09-20 ENCOUNTER — COMMUNICATION - HEALTHEAST (OUTPATIENT)
Dept: FAMILY MEDICINE | Facility: CLINIC | Age: 39
End: 2019-09-20

## 2019-09-20 DIAGNOSIS — G89.29 OTHER CHRONIC PAIN: ICD-10-CM

## 2019-09-20 DIAGNOSIS — N39.0 ACUTE UTI: ICD-10-CM

## 2019-09-29 ENCOUNTER — COMMUNICATION - HEALTHEAST (OUTPATIENT)
Dept: FAMILY MEDICINE | Facility: CLINIC | Age: 39
End: 2019-09-29

## 2019-09-29 DIAGNOSIS — R52 PAIN: ICD-10-CM

## 2019-09-30 ENCOUNTER — COMMUNICATION - HEALTHEAST (OUTPATIENT)
Dept: FAMILY MEDICINE | Facility: CLINIC | Age: 39
End: 2019-09-30

## 2019-09-30 ENCOUNTER — AMBULATORY - HEALTHEAST (OUTPATIENT)
Dept: FAMILY MEDICINE | Facility: CLINIC | Age: 39
End: 2019-09-30

## 2019-09-30 DIAGNOSIS — Z30.8 ENCOUNTER FOR OTHER CONTRACEPTIVE MANAGEMENT: ICD-10-CM

## 2019-10-16 ENCOUNTER — COMMUNICATION - HEALTHEAST (OUTPATIENT)
Dept: FAMILY MEDICINE | Facility: CLINIC | Age: 39
End: 2019-10-16

## 2019-10-16 DIAGNOSIS — G89.29 OTHER CHRONIC PAIN: ICD-10-CM

## 2019-10-24 ENCOUNTER — COMMUNICATION - HEALTHEAST (OUTPATIENT)
Dept: FAMILY MEDICINE | Facility: CLINIC | Age: 39
End: 2019-10-24

## 2019-10-24 DIAGNOSIS — R52 PAIN: ICD-10-CM

## 2019-11-13 ENCOUNTER — COMMUNICATION - HEALTHEAST (OUTPATIENT)
Dept: FAMILY MEDICINE | Facility: CLINIC | Age: 39
End: 2019-11-13

## 2019-11-13 DIAGNOSIS — G89.29 OTHER CHRONIC PAIN: ICD-10-CM

## 2019-11-14 ENCOUNTER — COMMUNICATION - HEALTHEAST (OUTPATIENT)
Dept: FAMILY MEDICINE | Facility: CLINIC | Age: 39
End: 2019-11-14

## 2019-11-14 DIAGNOSIS — G89.29 OTHER CHRONIC PAIN: ICD-10-CM

## 2019-11-19 ENCOUNTER — COMMUNICATION - HEALTHEAST (OUTPATIENT)
Dept: FAMILY MEDICINE | Facility: CLINIC | Age: 39
End: 2019-11-19

## 2019-11-19 DIAGNOSIS — R52 PAIN: ICD-10-CM

## 2019-11-20 ENCOUNTER — COMMUNICATION - HEALTHEAST (OUTPATIENT)
Dept: FAMILY MEDICINE | Facility: CLINIC | Age: 39
End: 2019-11-20

## 2019-12-09 ENCOUNTER — COMMUNICATION - HEALTHEAST (OUTPATIENT)
Dept: FAMILY MEDICINE | Facility: CLINIC | Age: 39
End: 2019-12-09

## 2019-12-09 DIAGNOSIS — G89.29 OTHER CHRONIC PAIN: ICD-10-CM

## 2019-12-09 DIAGNOSIS — R52 PAIN: ICD-10-CM

## 2019-12-16 ENCOUNTER — AMBULATORY - HEALTHEAST (OUTPATIENT)
Dept: FAMILY MEDICINE | Facility: CLINIC | Age: 39
End: 2019-12-16

## 2019-12-16 ENCOUNTER — COMMUNICATION - HEALTHEAST (OUTPATIENT)
Dept: FAMILY MEDICINE | Facility: CLINIC | Age: 39
End: 2019-12-16

## 2019-12-16 DIAGNOSIS — R52 PAIN: ICD-10-CM

## 2020-01-06 ENCOUNTER — COMMUNICATION - HEALTHEAST (OUTPATIENT)
Dept: FAMILY MEDICINE | Facility: CLINIC | Age: 40
End: 2020-01-06

## 2020-01-06 DIAGNOSIS — G89.29 OTHER CHRONIC PAIN: ICD-10-CM

## 2020-01-12 ENCOUNTER — COMMUNICATION - HEALTHEAST (OUTPATIENT)
Dept: FAMILY MEDICINE | Facility: CLINIC | Age: 40
End: 2020-01-12

## 2020-01-12 DIAGNOSIS — R52 PAIN: ICD-10-CM

## 2020-01-13 ENCOUNTER — COMMUNICATION - HEALTHEAST (OUTPATIENT)
Dept: FAMILY MEDICINE | Facility: CLINIC | Age: 40
End: 2020-01-13

## 2020-01-13 ENCOUNTER — AMBULATORY - HEALTHEAST (OUTPATIENT)
Dept: FAMILY MEDICINE | Facility: CLINIC | Age: 40
End: 2020-01-13

## 2020-01-13 DIAGNOSIS — R52 PAIN: ICD-10-CM

## 2020-01-23 ENCOUNTER — COMMUNICATION - HEALTHEAST (OUTPATIENT)
Dept: FAMILY MEDICINE | Facility: CLINIC | Age: 40
End: 2020-01-23

## 2020-01-30 ENCOUNTER — COMMUNICATION - HEALTHEAST (OUTPATIENT)
Dept: FAMILY MEDICINE | Facility: CLINIC | Age: 40
End: 2020-01-30

## 2020-01-30 DIAGNOSIS — G89.29 OTHER CHRONIC PAIN: ICD-10-CM

## 2020-02-03 ENCOUNTER — AMBULATORY - HEALTHEAST (OUTPATIENT)
Dept: FAMILY MEDICINE | Facility: CLINIC | Age: 40
End: 2020-02-03

## 2020-02-03 DIAGNOSIS — G89.29 OTHER CHRONIC PAIN: ICD-10-CM

## 2020-02-08 ENCOUNTER — COMMUNICATION - HEALTHEAST (OUTPATIENT)
Dept: FAMILY MEDICINE | Facility: CLINIC | Age: 40
End: 2020-02-08

## 2020-02-08 DIAGNOSIS — R52 PAIN: ICD-10-CM

## 2020-02-10 ENCOUNTER — COMMUNICATION - HEALTHEAST (OUTPATIENT)
Dept: FAMILY MEDICINE | Facility: CLINIC | Age: 40
End: 2020-02-10

## 2020-02-10 DIAGNOSIS — R52 PAIN: ICD-10-CM

## 2020-02-28 ENCOUNTER — COMMUNICATION - HEALTHEAST (OUTPATIENT)
Dept: FAMILY MEDICINE | Facility: CLINIC | Age: 40
End: 2020-02-28

## 2020-02-28 DIAGNOSIS — G89.29 OTHER CHRONIC PAIN: ICD-10-CM

## 2020-03-05 ENCOUNTER — COMMUNICATION - HEALTHEAST (OUTPATIENT)
Dept: FAMILY MEDICINE | Facility: CLINIC | Age: 40
End: 2020-03-05

## 2020-03-05 DIAGNOSIS — R52 PAIN: ICD-10-CM

## 2020-03-18 ENCOUNTER — COMMUNICATION - HEALTHEAST (OUTPATIENT)
Dept: FAMILY MEDICINE | Facility: CLINIC | Age: 40
End: 2020-03-18

## 2020-03-19 ENCOUNTER — OFFICE VISIT - HEALTHEAST (OUTPATIENT)
Dept: FAMILY MEDICINE | Facility: CLINIC | Age: 40
End: 2020-03-19

## 2020-03-19 DIAGNOSIS — J06.9 VIRAL UPPER RESPIRATORY TRACT INFECTION: ICD-10-CM

## 2020-03-19 DIAGNOSIS — G89.4 CHRONIC PAIN SYNDROME: ICD-10-CM

## 2020-03-19 ASSESSMENT — ANXIETY QUESTIONNAIRES
IF YOU CHECKED OFF ANY PROBLEMS ON THIS QUESTIONNAIRE, HOW DIFFICULT HAVE THESE PROBLEMS MADE IT FOR YOU TO DO YOUR WORK, TAKE CARE OF THINGS AT HOME, OR GET ALONG WITH OTHER PEOPLE: NOT DIFFICULT AT ALL
1. FEELING NERVOUS, ANXIOUS, OR ON EDGE: NOT AT ALL
6. BECOMING EASILY ANNOYED OR IRRITABLE: NOT AT ALL
7. FEELING AFRAID AS IF SOMETHING AWFUL MIGHT HAPPEN: NOT AT ALL
2. NOT BEING ABLE TO STOP OR CONTROL WORRYING: NOT AT ALL
4. TROUBLE RELAXING: NOT AT ALL
5. BEING SO RESTLESS THAT IT IS HARD TO SIT STILL: NOT AT ALL
GAD7 TOTAL SCORE: 0
3. WORRYING TOO MUCH ABOUT DIFFERENT THINGS: NOT AT ALL

## 2020-03-19 ASSESSMENT — PATIENT HEALTH QUESTIONNAIRE - PHQ9: SUM OF ALL RESPONSES TO PHQ QUESTIONS 1-9: 3

## 2020-03-30 ENCOUNTER — RECORDS - HEALTHEAST (OUTPATIENT)
Dept: ADMINISTRATIVE | Facility: OTHER | Age: 40
End: 2020-03-30

## 2020-04-01 ENCOUNTER — COMMUNICATION - HEALTHEAST (OUTPATIENT)
Dept: FAMILY MEDICINE | Facility: CLINIC | Age: 40
End: 2020-04-01

## 2020-04-02 ENCOUNTER — RECORDS - HEALTHEAST (OUTPATIENT)
Dept: ADMINISTRATIVE | Facility: OTHER | Age: 40
End: 2020-04-02

## 2020-04-05 ENCOUNTER — COMMUNICATION - HEALTHEAST (OUTPATIENT)
Dept: FAMILY MEDICINE | Facility: CLINIC | Age: 40
End: 2020-04-05

## 2020-04-05 DIAGNOSIS — G89.4 CHRONIC PAIN SYNDROME: ICD-10-CM

## 2020-04-06 ENCOUNTER — COMMUNICATION - HEALTHEAST (OUTPATIENT)
Dept: FAMILY MEDICINE | Facility: CLINIC | Age: 40
End: 2020-04-06

## 2020-04-06 DIAGNOSIS — G89.4 CHRONIC PAIN SYNDROME: ICD-10-CM

## 2020-04-06 DIAGNOSIS — F11.90 CHRONIC, CONTINUOUS USE OF OPIOIDS: ICD-10-CM

## 2020-04-14 ENCOUNTER — RECORDS - HEALTHEAST (OUTPATIENT)
Dept: ADMINISTRATIVE | Facility: OTHER | Age: 40
End: 2020-04-14

## 2020-04-17 ENCOUNTER — COMMUNICATION - HEALTHEAST (OUTPATIENT)
Dept: FAMILY MEDICINE | Facility: CLINIC | Age: 40
End: 2020-04-17

## 2020-04-17 DIAGNOSIS — G89.4 CHRONIC PAIN SYNDROME: ICD-10-CM

## 2020-04-20 ENCOUNTER — AMBULATORY - HEALTHEAST (OUTPATIENT)
Dept: FAMILY MEDICINE | Facility: CLINIC | Age: 40
End: 2020-04-20

## 2020-04-20 ENCOUNTER — COMMUNICATION - HEALTHEAST (OUTPATIENT)
Dept: FAMILY MEDICINE | Facility: CLINIC | Age: 40
End: 2020-04-20

## 2020-04-20 DIAGNOSIS — R68.84 JAW PAIN: ICD-10-CM

## 2020-04-23 ENCOUNTER — OFFICE VISIT - HEALTHEAST (OUTPATIENT)
Dept: FAMILY MEDICINE | Facility: CLINIC | Age: 40
End: 2020-04-23

## 2020-04-23 DIAGNOSIS — O09.522 MULTIGRAVIDA OF ADVANCED MATERNAL AGE IN SECOND TRIMESTER: ICD-10-CM

## 2020-04-23 DIAGNOSIS — R52 PAIN: ICD-10-CM

## 2020-04-23 DIAGNOSIS — Z34.90 PREGNANCY, UNSPECIFIED GESTATIONAL AGE: ICD-10-CM

## 2020-04-23 DIAGNOSIS — G89.4 CHRONIC PAIN SYNDROME: ICD-10-CM

## 2020-04-23 DIAGNOSIS — O09.90 HIGH-RISK PREGNANCY, UNSPECIFIED TRIMESTER: ICD-10-CM

## 2020-04-24 ENCOUNTER — COMMUNICATION - HEALTHEAST (OUTPATIENT)
Dept: SCHEDULING | Facility: CLINIC | Age: 40
End: 2020-04-24

## 2020-05-18 ENCOUNTER — COMMUNICATION - HEALTHEAST (OUTPATIENT)
Dept: FAMILY MEDICINE | Facility: CLINIC | Age: 40
End: 2020-05-18

## 2020-05-18 DIAGNOSIS — R52 PAIN: ICD-10-CM

## 2020-06-10 ENCOUNTER — COMMUNICATION - HEALTHEAST (OUTPATIENT)
Dept: SCHEDULING | Facility: CLINIC | Age: 40
End: 2020-06-10

## 2020-06-10 ENCOUNTER — COMMUNICATION - HEALTHEAST (OUTPATIENT)
Dept: FAMILY MEDICINE | Facility: CLINIC | Age: 40
End: 2020-06-10

## 2020-06-11 ENCOUNTER — AMBULATORY - HEALTHEAST (OUTPATIENT)
Dept: LAB | Facility: CLINIC | Age: 40
End: 2020-06-11

## 2020-06-11 ENCOUNTER — COMMUNICATION - HEALTHEAST (OUTPATIENT)
Dept: FAMILY MEDICINE | Facility: CLINIC | Age: 40
End: 2020-06-11

## 2020-06-11 ENCOUNTER — OFFICE VISIT - HEALTHEAST (OUTPATIENT)
Dept: FAMILY MEDICINE | Facility: CLINIC | Age: 40
End: 2020-06-11
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
## 2020-06-11 DIAGNOSIS — R53.83 FATIGUE, UNSPECIFIED TYPE: ICD-10-CM

## 2020-06-11 DIAGNOSIS — R52 PAIN: ICD-10-CM

## 2020-06-11 LAB
BASOPHILS # BLD AUTO: 0.1 THOU/UL (ref 0–0.2)
BASOPHILS NFR BLD AUTO: 0 % (ref 0–2)
EOSINOPHIL # BLD AUTO: 0.1 THOU/UL (ref 0–0.4)
EOSINOPHIL NFR BLD AUTO: 1 % (ref 0–6)
ERYTHROCYTE [DISTWIDTH] IN BLOOD BY AUTOMATED COUNT: 14.1 % (ref 11–14.5)
HCT VFR BLD AUTO: 32.1 % (ref 35–47)
HGB BLD-MCNC: 10.5 G/DL (ref 12–16)
LYMPHOCYTES # BLD AUTO: 4.3 THOU/UL (ref 0.8–4.4)
LYMPHOCYTES NFR BLD AUTO: 26 % (ref 20–40)
MCH RBC QN AUTO: 32.5 PG (ref 27–34)
MCHC RBC AUTO-ENTMCNC: 32.7 G/DL (ref 32–36)
MCV RBC AUTO: 99 FL (ref 80–100)
MONOCYTES # BLD AUTO: 0.8 THOU/UL (ref 0–0.9)
MONOCYTES NFR BLD AUTO: 5 % (ref 2–10)
NEUTROPHILS # BLD AUTO: 11.1 THOU/UL (ref 2–7.7)
NEUTROPHILS NFR BLD AUTO: 67 % (ref 50–70)
PLATELET # BLD AUTO: 338 THOU/UL (ref 140–440)
PMV BLD AUTO: 11.1 FL (ref 8.5–12.5)
RBC # BLD AUTO: 3.23 MILL/UL (ref 3.8–5.4)
WBC: 16.7 THOU/UL (ref 4–11)

## 2020-06-12 ENCOUNTER — COMMUNICATION - HEALTHEAST (OUTPATIENT)
Dept: FAMILY MEDICINE | Facility: CLINIC | Age: 40
End: 2020-06-12

## 2020-06-12 LAB — B BURGDOR IGG+IGM SER QL: 0.06 INDEX VALUE

## 2020-07-01 ENCOUNTER — COMMUNICATION - HEALTHEAST (OUTPATIENT)
Dept: FAMILY MEDICINE | Facility: CLINIC | Age: 40
End: 2020-07-01

## 2020-07-01 ENCOUNTER — RECORDS - HEALTHEAST (OUTPATIENT)
Dept: ADMINISTRATIVE | Facility: OTHER | Age: 40
End: 2020-07-01

## 2020-07-01 DIAGNOSIS — R52 PAIN: ICD-10-CM

## 2020-07-05 ENCOUNTER — COMMUNICATION - HEALTHEAST (OUTPATIENT)
Dept: FAMILY MEDICINE | Facility: CLINIC | Age: 40
End: 2020-07-05

## 2020-07-05 DIAGNOSIS — O21.9 NAUSEA AND VOMITING IN PREGNANCY: ICD-10-CM

## 2020-07-06 ENCOUNTER — COMMUNICATION - HEALTHEAST (OUTPATIENT)
Dept: FAMILY MEDICINE | Facility: CLINIC | Age: 40
End: 2020-07-06

## 2020-07-06 DIAGNOSIS — R11.2 NAUSEA AND VOMITING, INTRACTABILITY OF VOMITING NOT SPECIFIED, UNSPECIFIED VOMITING TYPE: ICD-10-CM

## 2020-07-06 DIAGNOSIS — R52 PAIN: ICD-10-CM

## 2020-07-06 DIAGNOSIS — R43.0 ANOSMIA: ICD-10-CM

## 2020-07-22 ENCOUNTER — COMMUNICATION - HEALTHEAST (OUTPATIENT)
Dept: FAMILY MEDICINE | Facility: CLINIC | Age: 40
End: 2020-07-22

## 2020-07-22 DIAGNOSIS — R52 PAIN: ICD-10-CM

## 2020-07-22 DIAGNOSIS — O21.9 NAUSEA AND VOMITING IN PREGNANCY: ICD-10-CM

## 2020-08-04 ENCOUNTER — COMMUNICATION - HEALTHEAST (OUTPATIENT)
Dept: FAMILY MEDICINE | Facility: CLINIC | Age: 40
End: 2020-08-04

## 2020-08-04 ENCOUNTER — AMBULATORY - HEALTHEAST (OUTPATIENT)
Dept: FAMILY MEDICINE | Facility: CLINIC | Age: 40
End: 2020-08-04

## 2020-08-04 ENCOUNTER — COMMUNICATION - HEALTHEAST (OUTPATIENT)
Dept: SCHEDULING | Facility: CLINIC | Age: 40
End: 2020-08-04

## 2020-08-04 DIAGNOSIS — R52 PAIN: ICD-10-CM

## 2020-08-07 ENCOUNTER — COMMUNICATION - HEALTHEAST (OUTPATIENT)
Dept: FAMILY MEDICINE | Facility: CLINIC | Age: 40
End: 2020-08-07

## 2020-08-07 DIAGNOSIS — G89.4 CHRONIC PAIN SYNDROME: ICD-10-CM

## 2020-08-11 ENCOUNTER — COMMUNICATION - HEALTHEAST (OUTPATIENT)
Dept: FAMILY MEDICINE | Facility: CLINIC | Age: 40
End: 2020-08-11

## 2020-09-07 ENCOUNTER — COMMUNICATION - HEALTHEAST (OUTPATIENT)
Dept: FAMILY MEDICINE | Facility: CLINIC | Age: 40
End: 2020-09-07

## 2020-09-07 DIAGNOSIS — R68.84 CHRONIC JAW PAIN: ICD-10-CM

## 2020-09-07 DIAGNOSIS — K80.20 CALCULUS OF GALLBLADDER WITHOUT CHOLECYSTITIS WITHOUT OBSTRUCTION: ICD-10-CM

## 2020-09-07 DIAGNOSIS — G97.1 SPINAL HEADACHE: ICD-10-CM

## 2020-09-07 DIAGNOSIS — Q79.60 EHLERS-DANLOS SYNDROME: ICD-10-CM

## 2020-09-07 DIAGNOSIS — G89.29 CHRONIC JAW PAIN: ICD-10-CM

## 2020-09-08 ENCOUNTER — COMMUNICATION - HEALTHEAST (OUTPATIENT)
Dept: FAMILY MEDICINE | Facility: CLINIC | Age: 40
End: 2020-09-08

## 2020-09-09 ENCOUNTER — COMMUNICATION - HEALTHEAST (OUTPATIENT)
Dept: FAMILY MEDICINE | Facility: CLINIC | Age: 40
End: 2020-09-09

## 2020-09-09 DIAGNOSIS — K80.20 GALLSTONES: ICD-10-CM

## 2020-09-09 DIAGNOSIS — G89.4 CHRONIC PAIN SYNDROME: ICD-10-CM

## 2020-09-10 ENCOUNTER — OFFICE VISIT - HEALTHEAST (OUTPATIENT)
Dept: FAMILY MEDICINE | Facility: CLINIC | Age: 40
End: 2020-09-10

## 2020-09-10 ENCOUNTER — COMMUNICATION - HEALTHEAST (OUTPATIENT)
Dept: FAMILY MEDICINE | Facility: CLINIC | Age: 40
End: 2020-09-10

## 2020-09-10 DIAGNOSIS — G89.4 CHRONIC PAIN SYNDROME: ICD-10-CM

## 2020-09-10 DIAGNOSIS — R16.0 HEPATOMEGALY: ICD-10-CM

## 2020-09-10 DIAGNOSIS — K80.20 CALCULUS OF GALLBLADDER WITHOUT CHOLECYSTITIS WITHOUT OBSTRUCTION: ICD-10-CM

## 2020-09-10 DIAGNOSIS — K76.0 FATTY LIVER: ICD-10-CM

## 2020-09-10 DIAGNOSIS — N13.30 HYDRONEPHROSIS, RIGHT: ICD-10-CM

## 2020-09-14 ENCOUNTER — COMMUNICATION - HEALTHEAST (OUTPATIENT)
Dept: FAMILY MEDICINE | Facility: CLINIC | Age: 40
End: 2020-09-14

## 2020-09-15 ENCOUNTER — COMMUNICATION - HEALTHEAST (OUTPATIENT)
Dept: FAMILY MEDICINE | Facility: CLINIC | Age: 40
End: 2020-09-15

## 2020-09-18 ENCOUNTER — COMMUNICATION - HEALTHEAST (OUTPATIENT)
Dept: FAMILY MEDICINE | Facility: CLINIC | Age: 40
End: 2020-09-18

## 2020-09-18 DIAGNOSIS — G97.1 SPINAL HEADACHE: ICD-10-CM

## 2020-09-18 DIAGNOSIS — G89.29 CHRONIC JAW PAIN: ICD-10-CM

## 2020-09-18 DIAGNOSIS — R68.84 CHRONIC JAW PAIN: ICD-10-CM

## 2020-09-18 DIAGNOSIS — K80.20 CALCULUS OF GALLBLADDER WITHOUT CHOLECYSTITIS WITHOUT OBSTRUCTION: ICD-10-CM

## 2020-09-21 ENCOUNTER — COMMUNICATION - HEALTHEAST (OUTPATIENT)
Dept: FAMILY MEDICINE | Facility: CLINIC | Age: 40
End: 2020-09-21

## 2020-09-21 DIAGNOSIS — K04.7 TOOTH ABSCESS: ICD-10-CM

## 2020-09-25 ENCOUNTER — COMMUNICATION - HEALTHEAST (OUTPATIENT)
Dept: FAMILY MEDICINE | Facility: CLINIC | Age: 40
End: 2020-09-25

## 2020-09-25 DIAGNOSIS — Q79.60 EHLERS-DANLOS SYNDROME: ICD-10-CM

## 2020-09-25 DIAGNOSIS — K80.20 CALCULUS OF GALLBLADDER WITHOUT CHOLECYSTITIS WITHOUT OBSTRUCTION: ICD-10-CM

## 2020-09-25 DIAGNOSIS — R68.84 CHRONIC JAW PAIN: ICD-10-CM

## 2020-09-25 DIAGNOSIS — G89.29 CHRONIC JAW PAIN: ICD-10-CM

## 2020-10-05 ENCOUNTER — COMMUNICATION - HEALTHEAST (OUTPATIENT)
Dept: FAMILY MEDICINE | Facility: CLINIC | Age: 40
End: 2020-10-05

## 2020-10-05 DIAGNOSIS — R68.84 CHRONIC JAW PAIN: ICD-10-CM

## 2020-10-05 DIAGNOSIS — G89.29 CHRONIC JAW PAIN: ICD-10-CM

## 2020-10-05 DIAGNOSIS — Q79.60 EHLERS-DANLOS SYNDROME: ICD-10-CM

## 2020-10-05 DIAGNOSIS — K80.20 CALCULUS OF GALLBLADDER WITHOUT CHOLECYSTITIS WITHOUT OBSTRUCTION: ICD-10-CM

## 2020-10-11 ENCOUNTER — COMMUNICATION - HEALTHEAST (OUTPATIENT)
Dept: FAMILY MEDICINE | Facility: CLINIC | Age: 40
End: 2020-10-11

## 2020-10-11 DIAGNOSIS — K80.20 CALCULUS OF GALLBLADDER WITHOUT CHOLECYSTITIS WITHOUT OBSTRUCTION: ICD-10-CM

## 2020-10-11 DIAGNOSIS — Q79.60 EHLERS-DANLOS SYNDROME: ICD-10-CM

## 2020-10-11 DIAGNOSIS — R68.84 CHRONIC JAW PAIN: ICD-10-CM

## 2020-10-11 DIAGNOSIS — G89.29 CHRONIC JAW PAIN: ICD-10-CM

## 2020-10-12 ENCOUNTER — COMMUNICATION - HEALTHEAST (OUTPATIENT)
Dept: FAMILY MEDICINE | Facility: CLINIC | Age: 40
End: 2020-10-12

## 2020-10-21 ENCOUNTER — COMMUNICATION - HEALTHEAST (OUTPATIENT)
Dept: FAMILY MEDICINE | Facility: CLINIC | Age: 40
End: 2020-10-21

## 2020-10-21 DIAGNOSIS — Q79.60 EHLERS-DANLOS SYNDROME: ICD-10-CM

## 2020-10-21 DIAGNOSIS — K04.7 TOOTH ABSCESS: ICD-10-CM

## 2020-10-21 DIAGNOSIS — K80.20 CALCULUS OF GALLBLADDER WITHOUT CHOLECYSTITIS WITHOUT OBSTRUCTION: ICD-10-CM

## 2020-10-21 DIAGNOSIS — G89.29 CHRONIC JAW PAIN: ICD-10-CM

## 2020-10-21 DIAGNOSIS — R68.84 CHRONIC JAW PAIN: ICD-10-CM

## 2020-10-21 DIAGNOSIS — Z30.09 GENERAL COUNSELING FOR PRESCRIPTION OF ORAL CONTRACEPTIVES: ICD-10-CM

## 2020-11-05 ENCOUNTER — OFFICE VISIT - HEALTHEAST (OUTPATIENT)
Dept: FAMILY MEDICINE | Facility: CLINIC | Age: 40
End: 2020-11-05

## 2020-11-05 DIAGNOSIS — K80.20 CALCULUS OF GALLBLADDER WITHOUT CHOLECYSTITIS WITHOUT OBSTRUCTION: ICD-10-CM

## 2020-11-05 DIAGNOSIS — Q79.60 EHLERS-DANLOS SYNDROME: ICD-10-CM

## 2020-11-05 DIAGNOSIS — G89.29 CHRONIC JAW PAIN: ICD-10-CM

## 2020-11-05 DIAGNOSIS — R68.84 CHRONIC JAW PAIN: ICD-10-CM

## 2020-11-05 DIAGNOSIS — G89.4 CHRONIC PAIN SYNDROME: ICD-10-CM

## 2020-11-10 ENCOUNTER — COMMUNICATION - HEALTHEAST (OUTPATIENT)
Dept: FAMILY MEDICINE | Facility: CLINIC | Age: 40
End: 2020-11-10

## 2020-11-30 ENCOUNTER — COMMUNICATION - HEALTHEAST (OUTPATIENT)
Dept: FAMILY MEDICINE | Facility: CLINIC | Age: 40
End: 2020-11-30

## 2020-11-30 DIAGNOSIS — G89.4 CHRONIC PAIN SYNDROME: ICD-10-CM

## 2020-12-06 ENCOUNTER — COMMUNICATION - HEALTHEAST (OUTPATIENT)
Dept: FAMILY MEDICINE | Facility: CLINIC | Age: 40
End: 2020-12-06

## 2020-12-06 DIAGNOSIS — K80.20 CALCULUS OF GALLBLADDER WITHOUT CHOLECYSTITIS WITHOUT OBSTRUCTION: ICD-10-CM

## 2020-12-06 DIAGNOSIS — Q79.60 EHLERS-DANLOS SYNDROME: ICD-10-CM

## 2020-12-06 DIAGNOSIS — R68.84 CHRONIC JAW PAIN: ICD-10-CM

## 2020-12-06 DIAGNOSIS — G89.29 CHRONIC JAW PAIN: ICD-10-CM

## 2020-12-07 ENCOUNTER — COMMUNICATION - HEALTHEAST (OUTPATIENT)
Dept: FAMILY MEDICINE | Facility: CLINIC | Age: 40
End: 2020-12-07

## 2020-12-07 DIAGNOSIS — G89.29 CHRONIC JAW PAIN: ICD-10-CM

## 2020-12-07 DIAGNOSIS — K80.20 CALCULUS OF GALLBLADDER WITHOUT CHOLECYSTITIS WITHOUT OBSTRUCTION: ICD-10-CM

## 2020-12-07 DIAGNOSIS — R68.84 CHRONIC JAW PAIN: ICD-10-CM

## 2020-12-07 DIAGNOSIS — Q79.60 EHLERS-DANLOS SYNDROME: ICD-10-CM

## 2020-12-23 ENCOUNTER — COMMUNICATION - HEALTHEAST (OUTPATIENT)
Dept: FAMILY MEDICINE | Facility: CLINIC | Age: 40
End: 2020-12-23

## 2020-12-23 DIAGNOSIS — K80.20 CALCULUS OF GALLBLADDER WITHOUT CHOLECYSTITIS WITHOUT OBSTRUCTION: ICD-10-CM

## 2020-12-23 DIAGNOSIS — R68.84 CHRONIC JAW PAIN: ICD-10-CM

## 2020-12-23 DIAGNOSIS — Q79.60 EHLERS-DANLOS SYNDROME: ICD-10-CM

## 2020-12-23 DIAGNOSIS — G89.29 CHRONIC JAW PAIN: ICD-10-CM

## 2020-12-27 ENCOUNTER — COMMUNICATION - HEALTHEAST (OUTPATIENT)
Dept: FAMILY MEDICINE | Facility: CLINIC | Age: 40
End: 2020-12-27

## 2020-12-27 DIAGNOSIS — G89.4 CHRONIC PAIN SYNDROME: ICD-10-CM

## 2020-12-30 ENCOUNTER — COMMUNICATION - HEALTHEAST (OUTPATIENT)
Dept: FAMILY MEDICINE | Facility: CLINIC | Age: 40
End: 2020-12-30

## 2020-12-30 ENCOUNTER — COMMUNICATION - HEALTHEAST (OUTPATIENT)
Dept: SCHEDULING | Facility: CLINIC | Age: 40
End: 2020-12-30

## 2020-12-30 DIAGNOSIS — K80.20 CALCULUS OF GALLBLADDER WITHOUT CHOLECYSTITIS WITHOUT OBSTRUCTION: ICD-10-CM

## 2020-12-30 DIAGNOSIS — G89.29 CHRONIC JAW PAIN: ICD-10-CM

## 2020-12-30 DIAGNOSIS — Q79.60 EHLERS-DANLOS SYNDROME: ICD-10-CM

## 2020-12-30 DIAGNOSIS — R68.84 CHRONIC JAW PAIN: ICD-10-CM

## 2020-12-31 ENCOUNTER — COMMUNICATION - HEALTHEAST (OUTPATIENT)
Dept: FAMILY MEDICINE | Facility: CLINIC | Age: 40
End: 2020-12-31

## 2021-01-21 ENCOUNTER — COMMUNICATION - HEALTHEAST (OUTPATIENT)
Dept: FAMILY MEDICINE | Facility: CLINIC | Age: 41
End: 2021-01-21

## 2021-01-21 DIAGNOSIS — G89.4 CHRONIC PAIN SYNDROME: ICD-10-CM

## 2021-01-24 ENCOUNTER — COMMUNICATION - HEALTHEAST (OUTPATIENT)
Dept: FAMILY MEDICINE | Facility: CLINIC | Age: 41
End: 2021-01-24

## 2021-01-24 DIAGNOSIS — G89.4 CHRONIC PAIN SYNDROME: ICD-10-CM

## 2021-01-25 ENCOUNTER — AMBULATORY - HEALTHEAST (OUTPATIENT)
Dept: FAMILY MEDICINE | Facility: CLINIC | Age: 41
End: 2021-01-25

## 2021-01-25 DIAGNOSIS — G89.4 CHRONIC PAIN SYNDROME: ICD-10-CM

## 2021-01-26 ENCOUNTER — COMMUNICATION - HEALTHEAST (OUTPATIENT)
Dept: FAMILY MEDICINE | Facility: CLINIC | Age: 41
End: 2021-01-26

## 2021-01-28 ENCOUNTER — COMMUNICATION - HEALTHEAST (OUTPATIENT)
Dept: SCHEDULING | Facility: CLINIC | Age: 41
End: 2021-01-28

## 2021-01-28 DIAGNOSIS — Q79.60 EHLERS-DANLOS SYNDROME: ICD-10-CM

## 2021-01-28 DIAGNOSIS — G89.29 CHRONIC JAW PAIN: ICD-10-CM

## 2021-01-28 DIAGNOSIS — R68.84 CHRONIC JAW PAIN: ICD-10-CM

## 2021-01-28 DIAGNOSIS — K80.20 CALCULUS OF GALLBLADDER WITHOUT CHOLECYSTITIS WITHOUT OBSTRUCTION: ICD-10-CM

## 2021-01-29 ENCOUNTER — COMMUNICATION - HEALTHEAST (OUTPATIENT)
Dept: FAMILY MEDICINE | Facility: CLINIC | Age: 41
End: 2021-01-29

## 2021-01-29 ENCOUNTER — OFFICE VISIT - HEALTHEAST (OUTPATIENT)
Dept: FAMILY MEDICINE | Facility: CLINIC | Age: 41
End: 2021-01-29

## 2021-01-29 DIAGNOSIS — Z59.9 FINANCIAL DIFFICULTIES: ICD-10-CM

## 2021-01-29 DIAGNOSIS — K80.20 CALCULUS OF GALLBLADDER WITHOUT CHOLECYSTITIS WITHOUT OBSTRUCTION: ICD-10-CM

## 2021-01-29 DIAGNOSIS — Z79.891 LONG TERM (CURRENT) USE OF OPIATE ANALGESIC: ICD-10-CM

## 2021-01-29 DIAGNOSIS — G89.4 CHRONIC PAIN SYNDROME: ICD-10-CM

## 2021-01-29 DIAGNOSIS — Z59.71 DOES NOT HAVE HEALTH INSURANCE: ICD-10-CM

## 2021-01-29 SDOH — ECONOMIC STABILITY - INCOME SECURITY: PROBLEM RELATED TO HOUSING AND ECONOMIC CIRCUMSTANCES, UNSPECIFIED: Z59.9

## 2021-02-01 ENCOUNTER — COMMUNICATION - HEALTHEAST (OUTPATIENT)
Dept: NURSING | Facility: CLINIC | Age: 41
End: 2021-02-01

## 2021-02-09 ENCOUNTER — COMMUNICATION - HEALTHEAST (OUTPATIENT)
Dept: NURSING | Facility: CLINIC | Age: 41
End: 2021-02-09

## 2021-02-18 ENCOUNTER — COMMUNICATION - HEALTHEAST (OUTPATIENT)
Dept: FAMILY MEDICINE | Facility: CLINIC | Age: 41
End: 2021-02-18

## 2021-02-18 DIAGNOSIS — G89.4 CHRONIC PAIN SYNDROME: ICD-10-CM

## 2021-02-19 ENCOUNTER — COMMUNICATION - HEALTHEAST (OUTPATIENT)
Dept: FAMILY MEDICINE | Facility: CLINIC | Age: 41
End: 2021-02-19

## 2021-02-19 DIAGNOSIS — K80.20 CALCULUS OF GALLBLADDER WITHOUT CHOLECYSTITIS WITHOUT OBSTRUCTION: ICD-10-CM

## 2021-02-19 DIAGNOSIS — R68.84 CHRONIC JAW PAIN: ICD-10-CM

## 2021-02-19 DIAGNOSIS — Q79.60 EHLERS-DANLOS SYNDROME: ICD-10-CM

## 2021-02-19 DIAGNOSIS — G89.29 CHRONIC JAW PAIN: ICD-10-CM

## 2021-02-23 ENCOUNTER — COMMUNICATION - HEALTHEAST (OUTPATIENT)
Dept: SCHEDULING | Facility: CLINIC | Age: 41
End: 2021-02-23

## 2021-02-23 DIAGNOSIS — G89.29 CHRONIC JAW PAIN: ICD-10-CM

## 2021-02-23 DIAGNOSIS — R68.84 CHRONIC JAW PAIN: ICD-10-CM

## 2021-02-23 DIAGNOSIS — K80.20 CALCULUS OF GALLBLADDER WITHOUT CHOLECYSTITIS WITHOUT OBSTRUCTION: ICD-10-CM

## 2021-02-23 DIAGNOSIS — Q79.60 EHLERS-DANLOS SYNDROME: ICD-10-CM

## 2021-02-24 ENCOUNTER — COMMUNICATION - HEALTHEAST (OUTPATIENT)
Dept: FAMILY MEDICINE | Facility: CLINIC | Age: 41
End: 2021-02-24

## 2021-02-25 ENCOUNTER — COMMUNICATION - HEALTHEAST (OUTPATIENT)
Dept: SCHEDULING | Facility: CLINIC | Age: 41
End: 2021-02-25

## 2021-02-25 DIAGNOSIS — G89.29 CHRONIC JAW PAIN: ICD-10-CM

## 2021-02-25 DIAGNOSIS — Q79.60 EHLERS-DANLOS SYNDROME: ICD-10-CM

## 2021-02-25 DIAGNOSIS — K80.20 CALCULUS OF GALLBLADDER WITHOUT CHOLECYSTITIS WITHOUT OBSTRUCTION: ICD-10-CM

## 2021-02-25 DIAGNOSIS — R68.84 CHRONIC JAW PAIN: ICD-10-CM

## 2021-03-18 ENCOUNTER — COMMUNICATION - HEALTHEAST (OUTPATIENT)
Dept: FAMILY MEDICINE | Facility: CLINIC | Age: 41
End: 2021-03-18

## 2021-03-18 DIAGNOSIS — G89.4 CHRONIC PAIN SYNDROME: ICD-10-CM

## 2021-03-25 ENCOUNTER — COMMUNICATION - HEALTHEAST (OUTPATIENT)
Dept: FAMILY MEDICINE | Facility: CLINIC | Age: 41
End: 2021-03-25

## 2021-03-25 DIAGNOSIS — R68.84 CHRONIC JAW PAIN: ICD-10-CM

## 2021-03-25 DIAGNOSIS — K80.20 CALCULUS OF GALLBLADDER WITHOUT CHOLECYSTITIS WITHOUT OBSTRUCTION: ICD-10-CM

## 2021-03-25 DIAGNOSIS — G89.29 CHRONIC JAW PAIN: ICD-10-CM

## 2021-03-25 DIAGNOSIS — Q79.60 EHLERS-DANLOS SYNDROME: ICD-10-CM

## 2021-04-15 ENCOUNTER — COMMUNICATION - HEALTHEAST (OUTPATIENT)
Dept: FAMILY MEDICINE | Facility: CLINIC | Age: 41
End: 2021-04-15

## 2021-04-15 DIAGNOSIS — G89.4 CHRONIC PAIN SYNDROME: ICD-10-CM

## 2021-04-20 ENCOUNTER — COMMUNICATION - HEALTHEAST (OUTPATIENT)
Dept: FAMILY MEDICINE | Facility: CLINIC | Age: 41
End: 2021-04-20

## 2021-04-20 DIAGNOSIS — Q79.60 EHLERS-DANLOS SYNDROME: ICD-10-CM

## 2021-04-20 DIAGNOSIS — K80.20 CALCULUS OF GALLBLADDER WITHOUT CHOLECYSTITIS WITHOUT OBSTRUCTION: ICD-10-CM

## 2021-04-20 DIAGNOSIS — G89.29 CHRONIC JAW PAIN: ICD-10-CM

## 2021-04-20 DIAGNOSIS — R68.84 CHRONIC JAW PAIN: ICD-10-CM

## 2021-05-12 ENCOUNTER — RECORDS - HEALTHEAST (OUTPATIENT)
Dept: FAMILY MEDICINE | Facility: CLINIC | Age: 41
End: 2021-05-12

## 2021-05-14 ENCOUNTER — COMMUNICATION - HEALTHEAST (OUTPATIENT)
Dept: FAMILY MEDICINE | Facility: CLINIC | Age: 41
End: 2021-05-14

## 2021-05-14 ENCOUNTER — OFFICE VISIT - HEALTHEAST (OUTPATIENT)
Dept: FAMILY MEDICINE | Facility: CLINIC | Age: 41
End: 2021-05-14

## 2021-05-14 DIAGNOSIS — F11.90 CHRONIC, CONTINUOUS USE OF OPIOIDS: ICD-10-CM

## 2021-05-14 DIAGNOSIS — F51.01 PRIMARY INSOMNIA: ICD-10-CM

## 2021-05-14 DIAGNOSIS — G89.4 CHRONIC PAIN SYNDROME: ICD-10-CM

## 2021-05-14 DIAGNOSIS — Z79.899 CONTROLLED SUBSTANCE AGREEMENT SIGNED: ICD-10-CM

## 2021-05-14 LAB
AMPHETAMINES UR QL SCN: ABNORMAL
BARBITURATES UR QL: ABNORMAL
BENZODIAZ UR QL: ABNORMAL
CANNABINOIDS UR QL SCN: ABNORMAL
COCAINE UR QL: ABNORMAL
CREAT UR-MCNC: 26.6 MG/DL
OPIATES UR QL SCN: ABNORMAL
OXYCODONE UR QL: ABNORMAL
PCP UR QL SCN: ABNORMAL

## 2021-05-14 ASSESSMENT — ANXIETY QUESTIONNAIRES
GAD7 TOTAL SCORE: 3
7. FEELING AFRAID AS IF SOMETHING AWFUL MIGHT HAPPEN: NOT AT ALL
5. BEING SO RESTLESS THAT IT IS HARD TO SIT STILL: NOT AT ALL
2. NOT BEING ABLE TO STOP OR CONTROL WORRYING: NOT AT ALL
6. BECOMING EASILY ANNOYED OR IRRITABLE: SEVERAL DAYS
3. WORRYING TOO MUCH ABOUT DIFFERENT THINGS: NOT AT ALL
4. TROUBLE RELAXING: SEVERAL DAYS
1. FEELING NERVOUS, ANXIOUS, OR ON EDGE: SEVERAL DAYS

## 2021-05-14 ASSESSMENT — MIFFLIN-ST. JEOR: SCORE: 1334.34

## 2021-05-14 ASSESSMENT — PATIENT HEALTH QUESTIONNAIRE - PHQ9: SUM OF ALL RESPONSES TO PHQ QUESTIONS 1-9: 5

## 2021-05-15 ENCOUNTER — COMMUNICATION - HEALTHEAST (OUTPATIENT)
Dept: FAMILY MEDICINE | Facility: CLINIC | Age: 41
End: 2021-05-15

## 2021-05-17 ENCOUNTER — COMMUNICATION - HEALTHEAST (OUTPATIENT)
Dept: FAMILY MEDICINE | Facility: CLINIC | Age: 41
End: 2021-05-17

## 2021-05-17 DIAGNOSIS — K80.20 CALCULUS OF GALLBLADDER WITHOUT CHOLECYSTITIS WITHOUT OBSTRUCTION: ICD-10-CM

## 2021-05-17 DIAGNOSIS — Q79.60 EHLERS-DANLOS SYNDROME: ICD-10-CM

## 2021-05-17 DIAGNOSIS — R68.84 CHRONIC JAW PAIN: ICD-10-CM

## 2021-05-17 DIAGNOSIS — G89.29 CHRONIC JAW PAIN: ICD-10-CM

## 2021-05-25 ENCOUNTER — RECORDS - HEALTHEAST (OUTPATIENT)
Dept: ADMINISTRATIVE | Facility: CLINIC | Age: 41
End: 2021-05-25

## 2021-05-26 ASSESSMENT — PATIENT HEALTH QUESTIONNAIRE - PHQ9: SUM OF ALL RESPONSES TO PHQ QUESTIONS 1-9: 3

## 2021-05-27 VITALS
BODY MASS INDEX: 22.87 KG/M2 | DIASTOLIC BLOOD PRESSURE: 65 MMHG | SYSTOLIC BLOOD PRESSURE: 120 MMHG | TEMPERATURE: 98.5 F | WEIGHT: 142.31 LBS | HEIGHT: 66 IN

## 2021-05-27 ASSESSMENT — PATIENT HEALTH QUESTIONNAIRE - PHQ9: SUM OF ALL RESPONSES TO PHQ QUESTIONS 1-9: 5

## 2021-05-27 NOTE — PROGRESS NOTES
Assessment/Plan:     1. Seborrheic dermatitis of scalp  ketoconazole (NIZORAL) 2 % shampoo    fluocinonide (LIDEX) 0.05 % external solution   2. Other chronic pain     3. Polyarthralgia         Diagnoses and all orders for this visit:    Seborrheic dermatitis of scalp  -     ketoconazole (NIZORAL) 2 % shampoo; Apply to damp skin, lather, leave on 5 minutes, and rinse. Apply twice weekly for 2 to 4 weeks.  Dispense: 120 mL; Refill: 1  -     fluocinonide (LIDEX) 0.05 % external solution; Apply sparingly to scalp twice daily for up to 14 days  Dispense: 60 mL; Refill: 1  -Discussed with patient that sores on scalp have appearance consistent with seborrheic dermatitis.  Will treat with ketoconazole shampoo and fluocinonide solution.  Prescription sent to pharmacy.  Counseled on use of medication and side effects.  Will recheck next week when she returns to clinic.    Other chronic pain    Polyarthralgia  Release of information signed to obtain records from previous primary care provider.  Discussed that we will need to review records before I am comfortable sending in refill of OxyContin.  In meantime referral placed to new pain clinic.  Patient lives in [place] and requests to be seen by provider who works at [facility].           Subjective:      Lizzy Tom is a 38 y.o. female who comes in today with concern about sores on the top of her head.  States that they have been present for over 2 weeks.  They are not healing.  They are weeping.  She tends to pick at them.  They are not itchy.  They are a little bit sore.  She has tried a different type of shampoo recommended by her pharmacist.  This has not been helpful.  She has not recently been ill.  Reviewed her current medications and allergies.  Planning to have office visit next week to discuss transfer of care management back to this provider and multiple referrals.  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  Current primary care provider was not comfortable continuing to provide care and stated that she needs to find a new PCP.  Patient's previous PCP was prescribing OxyContin 30 mg twice daily to manage her pain.  Patient will need to get established with new provider for ongoing pain management.  She states that she will run out of her current supply of OxyContin before next week and does briefly request refill for that today.  Reviewed meds and allergies.  No other concerns or questions.    Current Outpatient Medications   Medication Sig Dispense Refill     naproxen (NAPROSYN) 500 MG tablet Take 1 capsule by mouth twice daily as needed for pain 60 tablet 3     VINICIUS 0.15-0.03 mg per tablet   0     cyclobenzaprine (FLEXERIL) 10 MG tablet Take 1 tablet (10 mg total) by mouth 3 (three) times a day as needed for muscle spasms. 30 tablet 3     fluocinonide (LIDEX) 0.05 % external solution Apply sparingly to scalp twice daily for up to 14 days 60 mL 1     ketoconazole (NIZORAL) 2 % shampoo Apply to damp skin, lather, leave on 5 minutes, and rinse. Apply twice weekly for 2 to 4 weeks. 120 mL 1     No current facility-administered medications for this visit.        Past Medical History, Family History, and Social History reviewed.  No past medical history on file.  Past Surgical History:   Procedure Laterality Date     BREAST BIOPSY       MANDIBLE SURGERY       RECONSTRUCTION MANDIBLE / MAXILLA  2015     Latex and Morphine  Family History   Problem Relation Age of Onset     Cancer Father         lung     Diabetes Maternal Grandfather      Cancer Maternal Grandfather      Prostate cancer Maternal Grandfather      No Medical Problems Son      Social History     Socioeconomic History     Marital status: Single     Spouse name: Not on file     Number of children: Not on file     Years of education: Not on file      Highest education level: Not on file   Occupational History     Not on file   Social Needs     Financial resource strain: Not on file     Food insecurity:     Worry: Not on file     Inability: Not on file     Transportation needs:     Medical: Not on file     Non-medical: Not on file   Tobacco Use     Smoking status: Current Some Day Smoker     Packs/day: 0.50     Years: 17.00     Pack years: 8.50     Types: Cigarettes     Last attempt to quit: 2016     Years since quittin.7     Smokeless tobacco: Never Used     Tobacco comment: 2 per day   Substance and Sexual Activity     Alcohol use: No     Drug use: Yes     Types: Oxycodone     Sexual activity: Not Currently     Partners: Male     Birth control/protection: None     Comment: single   Lifestyle     Physical activity:     Days per week: Not on file     Minutes per session: Not on file     Stress: Not on file   Relationships     Social connections:     Talks on phone: Not on file     Gets together: Not on file     Attends Yarsanism service: Not on file     Active member of club or organization: Not on file     Attends meetings of clubs or organizations: Not on file     Relationship status: Not on file     Intimate partner violence:     Fear of current or ex partner: Not on file     Emotionally abused: Not on file     Physically abused: Not on file     Forced sexual activity: Not on file   Other Topics Concern     Not on file   Social History Narrative     Not on file         Review of systems is as stated in HPI, and the remainder of the 10 system review is otherwise negative.    Objective:     Vitals:    19 1459   BP: 117/65   Patient Site: Right Arm   Patient Position: Sitting   Cuff Size: Adult Regular   Pulse: 94   Temp: 98  F (36.7  C)   TempSrc: Oral   SpO2: 97%   Weight: 145 lb 2 oz (65.8 kg)    Body mass index is 24.15 kg/m .    General appearance: alert, appears stated age and cooperative  Head: Normocephalic, without obvious abnormality,  atraumatic  Skin: 2-3 sores present on crown of head with some associated scaling but no sign of infection        This note has been dictated using voice recognition software. Any grammatical or context distortions are unintentional and inherent to the the software.

## 2021-05-27 NOTE — TELEPHONE ENCOUNTER
New Appointment Needed  What is the reason for the visit:    Same Date/Next Day Appt Request  What is the reason for your visit?: Sores on head, weepy, wonders if infected, wonders if shingles, ongoing two weeks  -declined nurse triage  Provider Preference: PCP only  How soon do you need to be seen?: today or tomorrow, patient is off work by 8 am, so can come in anytime.  Waitlist offered?: No  Okay to leave a detailed message:  Yes

## 2021-05-27 NOTE — TELEPHONE ENCOUNTER
Left message to call back for: appointment   Information to relay to patient:  Can patient come to see Dr. Fuentes today at 2:40 pm?

## 2021-05-27 NOTE — TELEPHONE ENCOUNTER
Left message to call back for: appointment   Information to relay to patient:  Please help schedule an appointment with an available provider or refer to walk-in care.

## 2021-05-28 ASSESSMENT — ANXIETY QUESTIONNAIRES
GAD7 TOTAL SCORE: 0
GAD7 TOTAL SCORE: 3

## 2021-05-28 NOTE — PATIENT INSTRUCTIONS - HE
Find out more information from rheumatologist about what type of braces are recommended    We may be able to refer you to [facility] department for these braces

## 2021-05-28 NOTE — PROGRESS NOTES
"Assessment/Plan:    1. Acute maxillary sinusitis, recurrence not specified  Acute maxillary sinusitis.  Augmentin 875/125 use 1 tablet twice daily x10 days.  Has had issues with recurrent sinusitis following prior dental surgery for overbite correction.  - amoxicillin-clavulanate (AUGMENTIN) 875-125 mg per tablet; Take 1 tablet by mouth 2 (two) times a day for 10 days.  Dispense: 20 tablet; Refill: 0    2. History of dental surgery  Jaw surgery historically for overbite deformity.  Has resulted in recurrent concerns of sinusitis issues noted.          Subjective:    Lizzy Tom is seen today for concerns for sinus infection.  Likely recurrent concern.  Had dental surgery several years ago for overbite deformity.  Does not feel that this is been not helpful and feels that they waited too long due to insurance denies and muscles \"not working the right way\".  Has had issues with sinusitis since that time.  Now recurrent concerns worse in the last 3 days however has had symptoms beyond this.  Some postnasal drainage.  Maxillary sinus tenderness.  Mild headache.  No fevers.  Some postnasal drainage.  No nausea or vomiting.  Patient has been told that she may have Erhlos Danlos syndrome and will work with a  locally to confirm diagnosis.  Comprehensive review of systems as above otherwise all negative.    Engaged  21 y.o.   4 y.o.   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     Social History     Tobacco Use     Smoking status: Current Some Day Smoker     Packs/day: 0.50     Years: 17.00     Pack years: 8.50     Types: Cigarettes     Last attempt to quit: 2016     Years since quittin.8     Smokeless tobacco: Never Used     Tobacco comment: 2 per day   Substance Use Topics     Alcohol use: No     Drug use: Yes     Types: Oxycodone        Current Outpatient Medications   Medication Sig Dispense Refill     cyclobenzaprine (FLEXERIL) 10 MG tablet Take 1 tablet (10 mg total) by mouth 3 (three) times a day as needed for muscle spasms. 30 tablet 3     fluocinonide (LIDEX) 0.05 % external solution Apply sparingly to scalp twice daily for up to 14 days 60 mL 1     ketoconazole (NIZORAL) 2 % shampoo Apply to damp skin, lather, leave on 5 minutes, and rinse. Apply twice weekly for 2 to 4 weeks. 120 mL 1     naproxen (NAPROSYN) 500 MG tablet Take 1 capsule by mouth twice daily as needed for pain 60 tablet 3     oxyCODONE (ROXICODONE) 10 mg immediate release tablet Take 1 tablet (10 mg total) by mouth every 4 (four) hours as needed for pain. Max of 5 per day 150 tablet 0     amoxicillin-clavulanate (AUGMENTIN) 875-125 mg per tablet Take 1 tablet by mouth 2 (two) times a day for 10 days. 20 tablet 0     No current facility-administered medications for this visit.           Objective:    Vitals:    19 1433   BP: 110/70   Pulse: 66   SpO2: 98%   Weight: 143 lb (64.9 kg)      Body mass index is 23.8 kg/m .    Alert.  Mildly ill.  Nontoxic and cooperative.  Prior dental surgery identified.  HEENT exam with nasal pharyngeal congestion and scant drainage.  Postnasal drainage on oral pharyngeal exam with moist mucous membranes.  Some posterior erythema.  Neck supple with shotty cervical lymphadenopathy.  Extremities warm and dry.      This note has been dictated using voice recognition  software and as a result may contain minor grammatical errors and unintended word substitutions.

## 2021-05-28 NOTE — PROGRESS NOTES
Assessment/Plan:     1. Hypermobility syndrome  Ambulatory referral to Genetics    Ambulatory referral to PT/OT   2. Encounter for other contraceptive management  DISCONTINUED: VINICIUS 28 0.15-0.03 mg per tablet   3. Polymyalgia (H)  Ambulatory referral to Genetics    Ambulatory referral to PT/OT   4. Arthralgia of multiple joints  Ambulatory referral to Genetics    Ambulatory referral to PT/OT       Diagnoses and all orders for this visit:    Hypermobility syndrome  -     Ambulatory referral to Genetics  -     Ambulatory referral to PT/OT    Encounter for other contraceptive management  Discussed with pt that combination birth control pills are not recommended in women over age 35 who smoke due to increased risk of blood clots. She will consider alternative options such as progesterone only pill, IUD or nexplanon. She will consider her options and make a final decision.    Polymyalgia (H)  -     Ambulatory referral to Genetics  -     Ambulatory referral to PT/OT    Arthralgia of multiple joints  -     Ambulatory referral to Genetics  -     Ambulatory referral to PT/OT               Subjective:      Lizzy Tom is a 38 y.o. female who comes in today to discuss referrals.  She has struggled with chronic multiple joint and muscle pains over the years.  She recently was seen by rheumatologist who evaluated her and diagnosed her with hypermobility syndrome.  They discussed the possibility that she had Ana Paula Danlos syndrome.  Patient was informed that this is a genetic diagnosis and that she must undergo genetic testing in order to find out if she has this syndrome.  There is not a  available in the area where she is currently living which is in [place].  She requests referral to a  within the [facility] system.  She is interested in finding out whether she truly has this diagnosis because it will have implications for her children.  Upon retrospect, she feels that her mother may have  had this syndrome as well.  She has been reading a lot of online information about this syndrome.  She is working to establish care with a new pain management specialist for management of her chronic joint and muscle pains.  They are still collecting records and are waiting for records from her rheumatologist before they are able to schedule her for an appointment.  She does not need refill of her oxycodone at this time.  She is planning to continue to see her current rheumatologist at [facility] and has an appointment for follow-up next month.  States her rheumatologist advised her to get braces for all of her joints to help manage her pain.  She does not know specifically what sort of braces would be advised.  She was also advised that pool therapy or water aerobics would be the best exercise for her as this would prevent her joints from popping in and out of place.  Depending upon whether she is diagnosed with Ana Paula Danlos syndrome, she may need future referrals for cardiology, neurology and ophthalmology but she does not need those placed at this time.  She also requests refill of birth control pills.  Current OCP is working well for her.  She was recently seen for sores on her scalp and she reports that they are healing with use of the ketoconazole shampoo and fluocinonide solution.  She has no other concerns or questions.  Review of systems otherwise negative.  Medications and allergies reviewed and updated.    Current Outpatient Medications   Medication Sig Dispense Refill     fluocinonide (LIDEX) 0.05 % external solution Apply sparingly to scalp twice daily for up to 14 days 60 mL 1     ketoconazole (NIZORAL) 2 % shampoo Apply to damp skin, lather, leave on 5 minutes, and rinse. Apply twice weekly for 2 to 4 weeks. 120 mL 1     naproxen (NAPROSYN) 500 MG tablet Take 1 capsule by mouth twice daily as needed for pain 60 tablet 3     oxyCODONE (ROXICODONE) 10 mg immediate release tablet Take 1  tablet (10 mg total) by mouth every 4 (four) hours as needed for pain. Max of 5 per day 150 tablet 0     cyclobenzaprine (FLEXERIL) 10 MG tablet Take 1 tablet (10 mg total) by mouth 3 (three) times a day as needed for muscle spasms. 30 tablet 3     No current facility-administered medications for this visit.        Past Medical History, Family History, and Social History reviewed.  No past medical history on file.  Past Surgical History:   Procedure Laterality Date     BREAST BIOPSY       MANDIBLE SURGERY       RECONSTRUCTION MANDIBLE / MAXILLA       Latex and Morphine  Family History   Problem Relation Age of Onset     Cancer Father         lung     Diabetes Maternal Grandfather      Cancer Maternal Grandfather      Prostate cancer Maternal Grandfather      No Medical Problems Son      Social History     Socioeconomic History     Marital status: Single     Spouse name: Not on file     Number of children: Not on file     Years of education: Not on file     Highest education level: Not on file   Occupational History     Not on file   Social Needs     Financial resource strain: Not on file     Food insecurity:     Worry: Not on file     Inability: Not on file     Transportation needs:     Medical: Not on file     Non-medical: Not on file   Tobacco Use     Smoking status: Current Some Day Smoker     Packs/day: 0.50     Years: 17.00     Pack years: 8.50     Types: Cigarettes     Last attempt to quit: 2016     Years since quittin.8     Smokeless tobacco: Never Used     Tobacco comment: 2 per day   Substance and Sexual Activity     Alcohol use: No     Drug use: Yes     Types: Oxycodone     Sexual activity: Not Currently     Partners: Male     Birth control/protection: None     Comment: single   Lifestyle     Physical activity:     Days per week: Not on file     Minutes per session: Not on file     Stress: Not on file   Relationships     Social connections:     Talks on phone: Not on file     Gets together: Not  on file     Attends Hindu service: Not on file     Active member of club or organization: Not on file     Attends meetings of clubs or organizations: Not on file     Relationship status: Not on file     Intimate partner violence:     Fear of current or ex partner: Not on file     Emotionally abused: Not on file     Physically abused: Not on file     Forced sexual activity: Not on file   Other Topics Concern     Not on file   Social History Narrative     Not on file         Review of systems is as stated in HPI, and the remainder of the 10 system review is otherwise negative.    Objective:     Vitals:    05/03/19 1304   BP: 110/65   Patient Site: Left Arm   Patient Position: Sitting   Cuff Size: Adult Regular   Pulse: 82   Temp: 98.2  F (36.8  C)   TempSrc: Oral   SpO2: 97%   Weight: 141 lb 6 oz (64.1 kg)    Body mass index is 23.53 kg/m .    General appearance: alert, appears stated age and cooperative  Head: Normocephalic, without obvious abnormality, atraumatic          This note has been dictated using voice recognition software. Any grammatical or context distortions are unintentional and inherent to the the software.

## 2021-05-28 NOTE — TELEPHONE ENCOUNTER
FYI - Status Update  Who is Calling: Shannan from [facility]  Update: Caller was calling to verify which oxycodone needs to be filled because oxycodone IR and oxycodone ER, was sent to [facility]. Caller was informed the oxycodone ER was discontinued and oxycodone IR was sent as a replacement.  Okay to leave a detailed message?:  No return call needed      Disp Refills Start End    oxyCODONE (OXYCONTIN) 30 mg 12 hr tablet (Discontinued) 60 tablet 0 4/19/2019 4/19/2019    Sig - Route: Take 1 tablet (30 mg total) by mouth every 12 (twelve) hours. - Oral    Sent to pharmacy as: oxyCODONE (OXYCONTIN) 30 mg 12 hr tablet    Earliest Fill Date: 4/19/2019    E-Prescribing Status: Receipt confirmed by pharmacy (4/19/2019 12:55 PM CDT)

## 2021-05-29 ENCOUNTER — RECORDS - HEALTHEAST (OUTPATIENT)
Dept: ADMINISTRATIVE | Facility: CLINIC | Age: 41
End: 2021-05-29

## 2021-05-30 VITALS — BODY MASS INDEX: 25.66 KG/M2 | WEIGHT: 154 LBS | HEIGHT: 65 IN

## 2021-05-30 VITALS — BODY MASS INDEX: 25.33 KG/M2 | WEIGHT: 152 LBS | HEIGHT: 65 IN

## 2021-05-30 VITALS — BODY MASS INDEX: 23.82 KG/M2 | HEIGHT: 65 IN | WEIGHT: 143 LBS

## 2021-05-30 VITALS — BODY MASS INDEX: 22.99 KG/M2 | WEIGHT: 138 LBS | HEIGHT: 65 IN

## 2021-05-30 VITALS — WEIGHT: 153.6 LBS | BODY MASS INDEX: 25.56 KG/M2

## 2021-05-30 NOTE — PROGRESS NOTES
Assessment/Plan:     1. Dental infection  clindamycin (CLEOCIN) 300 MG capsule    HM2(CBC w/o Differential)   2. Pain  naproxen (NAPROSYN) 500 MG tablet    oxyCODONE (ROXICODONE) 10 mg immediate release tablet       Diagnoses and all orders for this visit:    Dental infection  -     clindamycin (CLEOCIN) 300 MG capsule; Take 1 capsule (300 mg total) by mouth 3 (three) times a day for 10 days.  Dispense: 30 capsule; Refill: 0  -     HM2(CBC w/o Differential)  -Discussed with patient that she does have some swelling and tenderness of the right posterior gumline near her back molars.  I am concerned that she has a dental abscess in this area.  We will treat with clindamycin 300 mg 3 times daily for 10 days.  Counseled on use of medication and side effects.  Hemogram was performed and was normal.  I discussed importance of following up with her dentist and she will plan to do so.  She will continue oxycodone and naproxen to manage pain as well as topical ice and heat.  She will notify us of any new concerning symptoms develop.    Pain  -     naproxen (NAPROSYN) 500 MG tablet; Take 1 capsule by mouth twice daily as needed for pain  Dispense: 60 tablet; Refill: 3  -     oxyCODONE (ROXICODONE) 10 mg immediate release tablet; Take 1 tablet (10 mg total) by mouth every 4 (four) hours as needed for pain. Max of 5 per day  Dispense: 150 tablet; Refill: 0               Subjective:      Lizzy Tom is a 39 y.o. female who comes in today with concern about swollen right jaw.  She reports that about 5 or 6 days ago she developed a swelling in the right lower jaw.  This has been associated with a lot of pain as well as some chills.  She has not had fevers.  She has had previous jaw surgery and has plates in that area.  She is concerned that may be there has been an infection that has started.  She is aware that she needs to see her dentist.  She has not noticed any purulent drainage in her mouth.  She has not had ear pain  or sore throat.  She has been under a lot of stress recently as she may face eviction from her home due to a bounced check.  She did have some leftover Augmentin at home from a prescription she received for treatment of a sinus infection in May.  She started taking that about 4 days ago.  Has been taking this twice daily.  It has not helped.  She has also been taking Tylenol and naproxen as well as her usual oxycodone which she uses to manage chronic pain.  She has also been icing and.  She is quite miserable.  We reviewed medications and allergies and updated the chart.  Review of systems assessed and otherwise negative.  She has no other concerns or questions.    Current Outpatient Medications   Medication Sig Dispense Refill     naproxen (NAPROSYN) 500 MG tablet Take 1 capsule by mouth twice daily as needed for pain 60 tablet 3     norethindrone (ORTHO MICRONOR) 0.35 mg tablet Take 1 tablet (0.35 mg total) by mouth daily. 84 tablet 3     [START ON 7/14/2019] oxyCODONE (ROXICODONE) 10 mg immediate release tablet Take 1 tablet (10 mg total) by mouth every 4 (four) hours as needed for pain. Max of 5 per day 150 tablet 0     clindamycin (CLEOCIN) 300 MG capsule Take 1 capsule (300 mg total) by mouth 3 (three) times a day for 10 days. 30 capsule 0     cyclobenzaprine (FLEXERIL) 10 MG tablet Take 1 tablet (10 mg total) by mouth 3 (three) times a day as needed for muscle spasms. 30 tablet 3     No current facility-administered medications for this visit.        Past Medical History, Family History, and Social History reviewed.  No past medical history on file.  Past Surgical History:   Procedure Laterality Date     BREAST BIOPSY       MANDIBLE SURGERY       RECONSTRUCTION MANDIBLE / MAXILLA  2015     Latex and Morphine  Family History   Problem Relation Age of Onset     Cancer Father         lung     Diabetes Maternal Grandfather      Cancer Maternal Grandfather      Prostate cancer Maternal Grandfather      No Medical  Problems Son      Social History     Socioeconomic History     Marital status: Single     Spouse name: Not on file     Number of children: Not on file     Years of education: Not on file     Highest education level: Not on file   Occupational History     Not on file   Social Needs     Financial resource strain: Not on file     Food insecurity:     Worry: Not on file     Inability: Not on file     Transportation needs:     Medical: Not on file     Non-medical: Not on file   Tobacco Use     Smoking status: Current Some Day Smoker     Packs/day: 0.50     Years: 17.00     Pack years: 8.50     Types: Cigarettes     Last attempt to quit: 7/1/2016     Years since quitting: 3.0     Smokeless tobacco: Never Used     Tobacco comment: 2 per day   Substance and Sexual Activity     Alcohol use: No     Drug use: Yes     Types: Oxycodone     Sexual activity: Not Currently     Partners: Male     Birth control/protection: None     Comment: single   Lifestyle     Physical activity:     Days per week: Not on file     Minutes per session: Not on file     Stress: Not on file   Relationships     Social connections:     Talks on phone: Not on file     Gets together: Not on file     Attends Spiritism service: Not on file     Active member of club or organization: Not on file     Attends meetings of clubs or organizations: Not on file     Relationship status: Not on file     Intimate partner violence:     Fear of current or ex partner: Not on file     Emotionally abused: Not on file     Physically abused: Not on file     Forced sexual activity: Not on file   Other Topics Concern     Not on file   Social History Narrative     Not on file         Review of systems is as stated in HPI, and the remainder of the 10 system review is otherwise negative.    Objective:     Vitals:    07/10/19 0922   BP: 104/62   Patient Site: Right Arm   Patient Position: Sitting   Cuff Size: Adult Regular   Pulse: 80   Temp: 98.6  F (37  C)   TempSrc: Oral   SpO2:  "98%   Weight: 143 lb 3 oz (64.9 kg)   Height: 5' 5.75\" (1.67 m)    Body mass index is 23.29 kg/m .    General appearance: alert, appears stated age, cooperative and mild distress  Head: atraumatic, soft tissue swelling present right lower jaw  Eyes: negative  Ears: normal TM's and external ear canals both ears  Nose: turbinates red  Throat: abnormal findings: soft tissue swelling and tenderness present right posterior gums near back molars. no purulent discharge    Recent Results (from the past 24 hour(s))   HM2(CBC w/o Differential)    Collection Time: 07/10/19  9:49 AM   Result Value Ref Range    WBC 9.3 4.0 - 11.0 thou/uL    RBC 4.65 3.80 - 5.40 mill/uL    Hemoglobin 13.8 12.0 - 16.0 g/dL    Hematocrit 41.4 35.0 - 47.0 %    MCV 89 80 - 100 fL    MCH 29.7 27.0 - 34.0 pg    MCHC 33.3 32.0 - 36.0 g/dL    RDW 12.3 11.0 - 14.5 %    Platelets 264 140 - 440 thou/uL    MPV 7.6 7.0 - 10.0 fL         This note has been dictated using voice recognition software. Any grammatical or context distortions are unintentional and inherent to the the software.       "

## 2021-05-30 NOTE — PATIENT INSTRUCTIONS - HE
See dentist as soon as possible    Start clindamycin 3 times daily for 10 days    Take probiotic    Check hemogram today

## 2021-05-31 VITALS — BODY MASS INDEX: 23.57 KG/M2 | WEIGHT: 141.44 LBS | HEIGHT: 65 IN

## 2021-05-31 VITALS — WEIGHT: 143 LBS | HEIGHT: 65 IN | BODY MASS INDEX: 23.82 KG/M2

## 2021-05-31 VITALS — WEIGHT: 145 LBS | HEIGHT: 65 IN | BODY MASS INDEX: 24.16 KG/M2

## 2021-05-31 VITALS — WEIGHT: 138.44 LBS | BODY MASS INDEX: 23.04 KG/M2

## 2021-05-31 VITALS — BODY MASS INDEX: 22.38 KG/M2 | WEIGHT: 134.31 LBS | HEIGHT: 65 IN

## 2021-05-31 VITALS — BODY MASS INDEX: 23.54 KG/M2 | WEIGHT: 141.44 LBS

## 2021-05-31 VITALS — WEIGHT: 134.31 LBS | HEIGHT: 65 IN | BODY MASS INDEX: 22.38 KG/M2

## 2021-05-31 VITALS — WEIGHT: 142.38 LBS | HEIGHT: 65 IN | BODY MASS INDEX: 23.72 KG/M2

## 2021-05-31 VITALS — WEIGHT: 141 LBS | BODY MASS INDEX: 23.46 KG/M2

## 2021-05-31 VITALS — BODY MASS INDEX: 22.38 KG/M2 | HEIGHT: 65 IN | WEIGHT: 134.31 LBS

## 2021-05-31 NOTE — TELEPHONE ENCOUNTER
[facility] PA team  [phone]  Pool: HE PA MED ([id number])          PA has been initiated.       PA form completed and faxed insurance via Cover My Meds     Key:   [id number] - [id number]     Medication:  Oxycontin    Insurance:  P1        Response will be received via fax and may take up to 5-10 business days depending on plan

## 2021-05-31 NOTE — PROGRESS NOTES
Assessment/Plan:     1. Diarrhea, unspecified type  HM2(CBC w/o Differential)    Comprehensive Metabolic Panel    C. difficile Toxigenic by PCR    Culture, Stool   2. Upper respiratory tract infection, unspecified type  albuterol (PROAIR HFA;PROVENTIL HFA;VENTOLIN HFA) 90 mcg/actuation inhaler    guaiFENesin ER (MUCINEX) 600 mg 12 hr tablet   3. Other chronic pain  oxyCODONE (OXYCONTIN) 10 mg 12 hr tablet   4. Cough  benzonatate (TESSALON PERLES) 100 MG capsule       Diagnoses and all orders for this visit:    Diarrhea, unspecified type  -     HM2(CBC w/o Differential)  -     Comprehensive Metabolic Panel  -     C. difficile Toxigenic by PCR; Future  -     Culture, Stool; Future  -Hemogram is normal.  Will check comprehensive metabolic panel.  Stool samples collected for C. difficile and stool culture.  Recommend she resume probiotic supplement.  Follow bland diet.  Discussed importance of adequate fluid intake.  Further recommendations will be made once results are available    Upper respiratory tract infection, unspecified type  -     albuterol (PROAIR HFA;PROVENTIL HFA;VENTOLIN HFA) 90 mcg/actuation inhaler; Inhale 2 puffs every 6 (six) hours as needed for wheezing.  Dispense: 1 each; Refill: 0  -     guaiFENesin ER (MUCINEX) 600 mg 12 hr tablet; Take 1 tablet (600 mg total) by mouth 2 (two) times a day.  Dispense: 30 tablet; Refill: 0  -Discussed symptomatic cares.  Advised smoking cessation    Other chronic pain  -     oxyCODONE (OXYCONTIN) 10 mg 12 hr tablet; Take 1 capsule by mouth at bedtime  Dispense: 30 tablet; Refill: 0  -We will start oxycodone 10 mg long-acting at bedtime.  Prescription sent to pharmacy.  Continue regular release oxycodone during the day.  Follow-up in 1 month for med check.  Counseled on use of medication and side effects.    Cough  -     benzonatate (TESSALON PERLES) 100 MG capsule; Take 1 capsule (100 mg total) by mouth 3 (three) times a day as needed for cough.  Dispense: 30  capsule; Refill: 0           Subjective:      Lizzy Tom is a 39 y.o. female who comes in today with concern about diarrhea and upper respiratory infection.  She was seen in clinic in early July for a dental infection.  She was prescribed clindamycin 300 mg for 10 days.  She finished the antibiotic around .  States that she did fine with antibiotic but then developed diarrhea the last day or so that she was on the antibiotic.  She was taking a probiotic supplement.  Her tooth is feeling better but she has not yet followed up with her dentist.  She has continued to have frequent loose stools.  She has not noticed blood.  Stools are light brown to reddish-brown in appearance.  She has had abdominal cramping.  She feels chilled.  Several family members also sick with diarrhea symptoms.  She has history of Campylobacter about a year ago.  She recalls that at the time she had  puppies in the house.  She has 5-week-old  puppies as well.  She also cares for horses and other farm animals.  She is not able to work at her job because of her symptoms of diarrhea.  Her employer wants to know that she is not infectious before she returns to work.  For the last 5 days 6 days she has also had sore throat, runny nose, cough and chest congestion.  Sore throat is improved.  Her ribs hurt from coughing.  She is a smoker.  She has been taking Sudafed.  She does not have an albuterol inhaler.  Meds and allergies reviewed and updated.  Review of systems otherwise negative.  Final concern today is she would like to discuss getting a prescription for long-acting oxycodone to use for bedtime.  Finds that with her regular release oxycodone she is waking up every 2 hours.  She previously took long-acting oxycodone before her pregnancy and it worked well to help her get a good night sleep.  No other questions today.    Current Outpatient Medications   Medication Sig Dispense Refill     naproxen (NAPROSYN) 500 MG  tablet Take 1 capsule by mouth twice daily as needed for pain 60 tablet 3     norethindrone (ORTHO MICRONOR) 0.35 mg tablet Take 1 tablet (0.35 mg total) by mouth daily. 84 tablet 3     oxyCODONE (ROXICODONE) 10 mg immediate release tablet Take 1 tablet (10 mg total) by mouth every 4 (four) hours as needed for pain. Max of 5 per day 150 tablet 0     albuterol (PROAIR HFA;PROVENTIL HFA;VENTOLIN HFA) 90 mcg/actuation inhaler Inhale 2 puffs every 6 (six) hours as needed for wheezing. 1 each 0     benzonatate (TESSALON PERLES) 100 MG capsule Take 1 capsule (100 mg total) by mouth 3 (three) times a day as needed for cough. 30 capsule 0     cyclobenzaprine (FLEXERIL) 10 MG tablet Take 1 tablet (10 mg total) by mouth 3 (three) times a day as needed for muscle spasms. 30 tablet 3     guaiFENesin ER (MUCINEX) 600 mg 12 hr tablet Take 1 tablet (600 mg total) by mouth 2 (two) times a day. 30 tablet 0     oxyCODONE (OXYCONTIN) 10 mg 12 hr tablet Take 1 capsule by mouth at bedtime 30 tablet 0     No current facility-administered medications for this visit.        Past Medical History, Family History, and Social History reviewed.  No past medical history on file.  Past Surgical History:   Procedure Laterality Date     BREAST BIOPSY       MANDIBLE SURGERY       RECONSTRUCTION MANDIBLE / MAXILLA  2015     Latex and Morphine  Family History   Problem Relation Age of Onset     Cancer Father         lung     Diabetes Maternal Grandfather      Cancer Maternal Grandfather      Prostate cancer Maternal Grandfather      No Medical Problems Son      Social History     Socioeconomic History     Marital status: Single     Spouse name: Not on file     Number of children: Not on file     Years of education: Not on file     Highest education level: Not on file   Occupational History     Not on file   Social Needs     Financial resource strain: Not on file     Food insecurity:     Worry: Not on file     Inability: Not on file     Transportation  "needs:     Medical: Not on file     Non-medical: Not on file   Tobacco Use     Smoking status: Current Some Day Smoker     Packs/day: 0.50     Years: 17.00     Pack years: 8.50     Types: Cigarettes     Last attempt to quit: 7/1/2016     Years since quitting: 3.0     Smokeless tobacco: Never Used     Tobacco comment: 2 per day   Substance and Sexual Activity     Alcohol use: No     Drug use: Yes     Types: Oxycodone     Sexual activity: Not Currently     Partners: Male     Birth control/protection: None     Comment: single   Lifestyle     Physical activity:     Days per week: Not on file     Minutes per session: Not on file     Stress: Not on file   Relationships     Social connections:     Talks on phone: Not on file     Gets together: Not on file     Attends Christian service: Not on file     Active member of club or organization: Not on file     Attends meetings of clubs or organizations: Not on file     Relationship status: Not on file     Intimate partner violence:     Fear of current or ex partner: Not on file     Emotionally abused: Not on file     Physically abused: Not on file     Forced sexual activity: Not on file   Other Topics Concern     Not on file   Social History Narrative     Not on file         Review of systems is as stated in HPI, and the remainder of the 10 system review is otherwise negative.    Objective:     Vitals:    08/01/19 0952   BP: 127/68   Patient Site: Left Arm   Patient Position: Sitting   Cuff Size: Adult Regular   Pulse: 87   Temp: 98.1  F (36.7  C)   TempSrc: Oral   SpO2: 99%   Weight: 137 lb 4 oz (62.3 kg)   Height: 5' 5.75\" (1.67 m)    Body mass index is 22.32 kg/m .    General appearance: alert, appears stated age and cooperative  Head: Normocephalic, without obvious abnormality, atraumatic  Eyes: negative  Ears: normal TM's and external ear canals both ears  Nose: Nares normal. Septum midline. Mucosa normal. No drainage or sinus tenderness.  Throat: lips, mucosa, and tongue " normal; teeth and gums normal  Neck: no adenopathy  Back: negative  Heart: regular rate and rhythm, S1, S2 normal, no murmur, click, rub or gallop  Abdomen: soft, non-tender; bowel sounds normal; no masses,  no organomegaly    Recent Results (from the past 24 hour(s))   HM2(CBC w/o Differential)    Collection Time: 08/01/19 10:18 AM   Result Value Ref Range    WBC 7.5 4.0 - 11.0 thou/uL    RBC 4.88 3.80 - 5.40 mill/uL    Hemoglobin 14.3 12.0 - 16.0 g/dL    Hematocrit 42.6 35.0 - 47.0 %    MCV 87 80 - 100 fL    MCH 29.3 27.0 - 34.0 pg    MCHC 33.5 32.0 - 36.0 g/dL    RDW 12.5 11.0 - 14.5 %    Platelets 262 140 - 440 thou/uL    MPV 7.7 7.0 - 10.0 fL     This note has been dictated using voice recognition software. Any grammatical or context distortions are unintentional and inherent to the the software.

## 2021-05-31 NOTE — TELEPHONE ENCOUNTER
Controlled Substance Refill Request  Medication:   Requested Prescriptions     Pending Prescriptions Disp Refills     oxyCODONE (ROXICODONE) 10 mg immediate release tablet 120 tablet 0     Sig: Take 1 tablet by mouth every 4 hours as needed for chronic pain. Max of 4 per day     Date Last Fill: 8/7/19  Pharmacy: [id number]   Submit electronically to pharmacy  Controlled Substance Agreement on File:   Encounter-Level CSA Scan Date - 04/21/2017:    Scan on 4/28/2017  8:34 AM by Martha Ordaz: [facility] NARCOTIC AGREEMENT (below)         Last office visit: Last office visit pertaining to requested medication was 8/1/19.

## 2021-05-31 NOTE — TELEPHONE ENCOUNTER
Fax received from [facility], they have started the Prior Authorization Process via Cover My Meds    CoverMyMeds Key: [id number]    Medication Name:   oxyCODONE (OXYCONTIN) 10 mg 12 hr tablet 30 tablet 0 8/1/2019     Sig: Take 1 capsule by mouth at bedtime    Sent to pharmacy as: oxyCODONE (OXYCONTIN) 10 mg 12 hr tablet    Earliest Fill Date: 8/1/2019    E-Prescribing Status: Receipt confirmed by pharmacy (8/1/2019 10:11 AM CDT)          Insurance Plan: [employer]  PBM:   Patient ID: [id number]    Please complete the PA process

## 2021-05-31 NOTE — TELEPHONE ENCOUNTER
Needs refill on the OxyContin (regular)  today, she will be leaving for [place]. Pharm notified it is ok to refill today

## 2021-06-01 ENCOUNTER — RECORDS - HEALTHEAST (OUTPATIENT)
Dept: ADMINISTRATIVE | Facility: CLINIC | Age: 41
End: 2021-06-01

## 2021-06-01 VITALS — BODY MASS INDEX: 23.17 KG/M2 | WEIGHT: 139.25 LBS

## 2021-06-01 VITALS — WEIGHT: 132.06 LBS | BODY MASS INDEX: 22 KG/M2 | HEIGHT: 65 IN
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
## 2021-06-01 NOTE — TELEPHONE ENCOUNTER
Who is calling:  The patient  Reason for Call:  The patient is calling to check the status of the medication request. The patient is aware that it is pending review.   Date of last appointment with primary care: 8/1/2019  Okay to leave a detailed message: No

## 2021-06-01 NOTE — TELEPHONE ENCOUNTER
Controlled Substance Refill Request  Medication:   Requested Prescriptions     Pending Prescriptions Disp Refills     oxyCODONE (ROXICODONE) 10 mg immediate release tablet 120 tablet 0     Sig: Take 1 tablet by mouth every 4 hours as needed for chronic pain. Max of 4 per day     Date Last Fill: 9/4/19  [facility]  Submit electronically to pharmacy  Controlled Substance Agreement on File:   Encounter-Level CSA Scan Date - 04/21/2017:    Scan on 4/28/2017  8:34 AM by Martha Ordaz: [facility] NARCOTIC AGREEMENT       Last office visit: Last office visit pertaining to requested medication was 8/1/19.

## 2021-06-01 NOTE — TELEPHONE ENCOUNTER
Controlled Substance Refill Request  Medication:   Requested Prescriptions     Pending Prescriptions Disp Refills     oxyCODONE (OXYCONTIN) 10 mg 12 hr tablet 30 tablet 0     Sig: Take 1 capsule by mouth at bedtime     Date Last Fill: 8/23/19  Pharmacy  [id number]   Submit electronically to pharmacy  Controlled Substance Agreement on File:   Encounter-Level CSA Scan Date - 04/21/2017:    Scan on 4/28/2017  8:34 AM by Martha Ordaz: [facility] NARCOTIC AGREEMENT (below)         Last office visit: Last office visit pertaining to requested medication was 8/1/19.

## 2021-06-02 ENCOUNTER — RECORDS - HEALTHEAST (OUTPATIENT)
Dept: ADMINISTRATIVE | Facility: CLINIC | Age: 41
End: 2021-06-02

## 2021-06-02 VITALS — BODY MASS INDEX: 24.15 KG/M2 | WEIGHT: 145.13 LBS

## 2021-06-02 VITALS — WEIGHT: 143 LBS | BODY MASS INDEX: 23.8 KG/M2

## 2021-06-02 VITALS — WEIGHT: 141.38 LBS | BODY MASS INDEX: 23.53 KG/M2

## 2021-06-02 NOTE — TELEPHONE ENCOUNTER
Called and notified the pharmacy ok to refill oxycodone early due to travel.  Called and notified the patient the pharmacy is getting her oxycodone ready for .

## 2021-06-02 NOTE — TELEPHONE ENCOUNTER
Patient Returning Call  Reason for call:  Caller returning call to check on the status of this request.  Information relayed to patient:  Pending providers review and approval.  Patient has additional questions:  yes  If YES, what are your questions/concerns:  I am waiting to leave out of town with the car already packed.   Okay to leave a detailed message?: Yes

## 2021-06-02 NOTE — TELEPHONE ENCOUNTER
Reason contacted:  Medication refill.  Information relayed:  Going up to the [facility] water area and will return on 10/31/2019.   Additional questions:  No  Further follow-up needed:  Yes  Okay to leave a detailed message:  Yes

## 2021-06-02 NOTE — TELEPHONE ENCOUNTER
Patient Returning Call  Reason for call:  Patient called back.  Information relayed to patient:  n/a  Patient has additional questions:  Yes  If YES, what are your questions/concerns:  Patient is calling on the status of this medication. Informed that Dr Fuentes is not in the office until Friday.  Patient states she needs a covering provider to fill for her has she is out and is starting the hospital schedule of 7 days on.  Please address today.  Okay to leave a detailed message?: Yes

## 2021-06-02 NOTE — TELEPHONE ENCOUNTER
Who is calling:  Patient   Reason for Call:  Patient is calling In for follow up on below message oxycodone . Patient states she is going out of town today afternoon requesting to process as soon as possible.   Date of last appointment with primary care: 8/1/19  Okay to leave a detailed message: No

## 2021-06-02 NOTE — TELEPHONE ENCOUNTER
Medication Question or Clarification  Who is calling: Patient  What medication are you calling about? (include dose and sig)    Disp Refills Start End    oxyCODONE (ROXICODONE) 10 mg immediate release tablet 120 tablet 0 10/24/2019     Sig: Take 1 tablet by mouth every 4 hours as needed for chronic pain. Max of 4 per day    Sent to pharmacy as: oxyCODONE 10 mg tablet (ROXICODONE)    Earliest Fill Date: 10/24/2019    E-Prescribing Status: Receipt confirmed by pharmacy (10/24/2019  3:09 PM CDT)      Who prescribed the medication?: Oma Fuentes MD   What is your question/concern?: Patient stated that she was informed by the pharmacy that she can not  this medication until 10/27/19. Patient stated that the pharmacy is asking for a verbal override. Patient stated that she is leaving out of town tomorrow morning.  Pharmacy: [facility] #[id number]  Okay to leave a detailed message?: Yes, [phone]  Site CMT - Please call the pharmacy to obtain any additional needed information.      Write was called the pharmacy. Pharmacy informed the writer that the patient picked up a 30 days supply at their pharmacy on 9/4/19. Pharmacy stated that the patient again picked up a 30 days supply at a different [facility] on 9/30/19. Pharmacy stated that she should still have more. Pharmacy stated that patient can  this medication on 10/27/19.

## 2021-06-02 NOTE — TELEPHONE ENCOUNTER
FYI - Status Update  Who is Calling: Patient  Update: Patient stated that she will need to  this medication today. Patient stated that she is scheduled to work tomorrow working 6 am to 6 pm and the 8 am to 8 pm. Patient stated that with her work schedule and having to [facility]  her [facility] she will not make it to the pharmacy on time. Patient is questioning if a covering provider can approve an early filled. Patient would like a call back when this is done.  Okay to leave a detailed message?:  Yes   [phone]

## 2021-06-02 NOTE — TELEPHONE ENCOUNTER
reviewed - pt was given 30 tablets on 9/20 - based on my calculations she should not run out until this weekend, will place fill date as Friday.    Dr Aranda

## 2021-06-03 ENCOUNTER — RECORDS - HEALTHEAST (OUTPATIENT)
Dept: ADMINISTRATIVE | Facility: CLINIC | Age: 41
End: 2021-06-03

## 2021-06-03 VITALS — HEIGHT: 66 IN | BODY MASS INDEX: 23.01 KG/M2 | WEIGHT: 143.19 LBS

## 2021-06-03 VITALS — BODY MASS INDEX: 22.06 KG/M2 | HEIGHT: 66 IN | WEIGHT: 137.25 LBS

## 2021-06-03 NOTE — TELEPHONE ENCOUNTER
Controlled Substance Refill Request  Medication Name:   Requested Prescriptions     Pending Prescriptions Disp Refills     oxyCODONE (ROXICODONE) 10 mg immediate release tablet 120 tablet 0     Sig: Take 1 tablet by mouth every 4 hours as needed for chronic pain. Max of 4 per day     Date Last Fill: 10/24/19  [facility]     Submit electronically to pharmacy  Controlled Substance Agreement Date Scanned:   Encounter-Level CSA Scan Date - 04/21/2017:    Scan on 4/28/2017  8:34 AM by Martha Ordaz M: [facility] NARCOTIC AGREEMENT       Last office visit with prescriber/PCP: 8/1/2019 Oma Fuentes MD OR same dept: 8/1/2019 Oma Fuentes MD OR same specialty: 8/1/2019 Oma Fuentes MD  Last physical: 3/3/2016 Last MTM visit: Visit date not found

## 2021-06-03 NOTE — TELEPHONE ENCOUNTER
Controlled Substance Refill Request  Medication:   Requested Prescriptions     Pending Prescriptions Disp Refills     oxyCODONE (OXYCONTIN) 10 mg 12 hr tablet 30 tablet 0     Sig: Take 1 capsule by mouth at bedtime     Date Last Fill: 10/18/19  Pharmacy: [id number]   Submit electronically to pharmacy  Controlled Substance Agreement on File:   Encounter-Level CSA Scan Date - 04/21/2017:    Scan on 4/28/2017  8:34 AM by Martha Ordaz: [facility] NARCOTIC AGREEMENT       Last office visit: Last office visit pertaining to requested medication was 8/1/19.

## 2021-06-04 NOTE — TELEPHONE ENCOUNTER
Controlled Substance Refill Request  Medication:   Requested Prescriptions     Pending Prescriptions Disp Refills     oxyCODONE (OXYCONTIN) 10 mg 12 hr tablet 30 tablet 0     Sig: Take 1 capsule by mouth at bedtime     Date Last Fill: 11.14.19  [facility]   Submit electronically to pharmacy  Controlled Substance Agreement on File:   Encounter-Level CSA Scan Date - 04/21/2017:    Scan on 4/28/2017  8:34 AM by Martha Ordaz: [facility] NARCOTIC AGREEMENT       Last office visit: Last office visit pertaining to requested medication was 11.14.19.

## 2021-06-04 NOTE — TELEPHONE ENCOUNTER
Pt is requesting a new printed RX for the  Oxycodone immediate release. Pharmacist wrote all over the one printed last week, then would not refill it.  Tried to take it to another pharm and they wont take it either. Will bring in the RX and trade for new one.

## 2021-06-04 NOTE — TELEPHONE ENCOUNTER
PRINTED RX  Needed for the extended release oxycodone.  Do not send to pharm. Pt  has to pay out of pocket and  would like to shop for the cheapest pharm .   Pt advised DR Fuentes is out until Wednesday.

## 2021-06-05 NOTE — TELEPHONE ENCOUNTER
Pt would like a Printed RX for her long acting oxycodone. Would like to  on Friday. Refill due on Monday  But would like to fill it on either sat or sun.

## 2021-06-05 NOTE — TELEPHONE ENCOUNTER
Pt requesting her  RX for her immediate release oxycodone 10 mg, she would like this sent to [facility] in [place].

## 2021-06-05 NOTE — TELEPHONE ENCOUNTER
Pt is requesting refill  To be done on 1/8/2020 on the extended release oxycodone , please send to [facility]

## 2021-06-06 NOTE — TELEPHONE ENCOUNTER
New Appointment Needed  What is the reason for the visit:    TELEPHONE visit -  Patient states she would like to schedule a telephone visit with PCP to discuss medication and her symptoms (cough w/sore throat). See related mychart encounter messages.   Provider Preference: PCP only  How soon do you need to be seen?: as soon as able  Waitlist offered?: No  Okay to leave a detailed message:  Yes

## 2021-06-06 NOTE — TELEPHONE ENCOUNTER
Tried calling pt to let her know RX was sent to [facility] at 11:59 today but her number is no longer in service. Sent her a [facility] message.

## 2021-06-06 NOTE — TELEPHONE ENCOUNTER
Controlled Substance Refill Request  Medication Name:   Requested Prescriptions     Pending Prescriptions Disp Refills     oxyCODONE (OXYCONTIN) 10 mg 12 hr tablet 30 tablet 0     Sig: Take 1 capsule by mouth at bedtime     Date Last Fill: 2/3/20  [facility]  Submit electronically to pharmacy  Controlled Substance Agreement on file:   Encounter-Level CSA Scan Date - 04/21/2017:    Scan on 4/28/2017  8:34 AM by Martha Ordaz: [facility] NARCOTIC AGREEMENT        Last office visit:  8/1/2019

## 2021-06-07 NOTE — PROGRESS NOTES
"Lizzy Tom is a 39 y.o. female who is being evaluated via a billable telephone visit.      The patient has been notified of following:     \"This telephone visit will be conducted via a call between you and your physician/provider. We have found that certain health care needs can be provided without the need for a physical exam.  This service lets us provide the care you need with a short phone conversation.  If a prescription is necessary we can send it directly to your pharmacy.  If lab work is needed we can place an order for that and you can then stop by our lab to have the test done at a later time.    Telephone visits are billed at different rates depending on your insurance coverage. During this emergency period, for some insurers they may be billed the same as an in-person visit.  Please reach out to your insurance provider with any questions.    If during the course of the call the physician/provider feels a telephone visit is not appropriate, you will not be charged for this service.\"    Patient has given verbal consent to a Telephone visit? Yes    Patient would like to receive their AVS by AVS Preference: Renetta.    39-year-old  at 22 weeks and 3 days and an EDC of 2020.  This was an unplanned pregnancy for her and she found out she was pregnant at approximately 20 weeks gestation when she was being evaluated for appendicitis.  Since finding out she was pregnant, she did undergo an appendectomy successfully.  She had a consultation with maternal-fetal medicine on 2020.  She currently is doing well, taking a prenatal vitamin and needing refills of her chronic pain medication, oxycodone.    Her maternal-fetal medicine consult took place on 2020 and in summary revealed a normal ultrasound with appropriate fetal growth and anatomical survey.  She has a B+ blood type, antibody screening negative and routine obstetrical blood work also normal.  Recommendations including returning " to a primary for routine prenatal care, delivery at a tertiary care center due to chronic narcotic use, growth ultrasound recommended at 32 weeks and weekly BPP's from 32 weeks on due to chronic narcotic use.  NIPT screening.    We review her obstetrical history which is pertinent for history of 2 vaginal deliveries into the year  and then in 2017.  She had a second trimester miscarriage in 2018.  She has no history of hypertension,  labor or diabetes.  Her 2 children are alive and well without medical complications.  Her daughter did not have  abstinence syndrome after delivery despite chronic narcotic use.    She takes chronic pain medications due to joint laxity and possible earliest Danlos syndrome.  Recently she was managed at oxycodone 15 mg every 6 hours as needed.  Maternal-fetal medicine recommended that she return to the dose in which she successfully delivered her last child in 2017 which was oxycodone 10 mg every 4 hours as needed.  The total daily dosing is the same.  She followed with a pain clinic during her last pregnancy also.    She and her significant other are considering moving near the [facility] in  although this is not a concrete plan as of yet.  She is started on a prenatal vitamin.  She is a non-smoker and does not drink alcohol.      Assessment/Plan:  1. Pregnancy, unspecified gestational age  - Ambulatory referral to Obstetrics / Gynecology    2. Multigravida of advanced maternal age in second trimester  - Ambulatory referral to Obstetrics / Gynecology    3. Chronic pain syndrome  - Ambulatory referral to Pain Clinic    4. High-risk pregnancy, unspecified trimester  - Ambulatory referral to Obstetrics / Gynecology    5. Pain  - oxyCODONE (ROXICODONE) 10 mg immediate release tablet; Take 1 tablet by mouth every 4 hours as needed for chronic pain. Max of 6 per day  Dispense: 180 tablet; Refill: 0    39-year-old -0-1-2 with B+ blood type and an EDC of  8/24/2020.  1) high risk pregnancy due to advanced maternal age and chronic narcotic use.  -She had a low risk NIPT and consultation with MFM through [facility] on 4/14/2020 for complete details please see records.  -Recommended 32-week ultrasound for growth and weekly BPP's starting at 32 weeks for chronic narcotic use  -Delivery at a tertiary care center due to risk for MIGUEL  -I placed a referral to [facility] for pregnancy care  -taking prenatal vitamin with DHA and Vitamin D 1000 units daily    2) chronic pain syndrome with chronic narcotic use.  -Successfully delivered her last child on oxycodone 10 mg every 4 hours and we returned to that dose.  A renewal is sent to the pharmacy.  I have also referred her to a pain clinic for pain management during the pregnancy.    3) at this time she is considering a move to an area near the [facility] in June.  We discussed starting to look for providers there in the event that she does move.  She will need a OB provider who can manage her high risk pregnancy and plans for delivery at a tertiary care center.  She will also need a chronic pain specialist.    Phone call duration: 16minutes    Jana Escudero CMA

## 2021-06-07 NOTE — TELEPHONE ENCOUNTER
Pt is requesting a refill on her oxycodone. bellall having pain after her appy. Also states her mouth is so dry, she is drinking a lot but just cant keep her mouth wet. She is wondering if there is anything you could recommend or if she is too dehydrated and needs IV fluids.

## 2021-06-07 NOTE — TELEPHONE ENCOUNTER
Medication Question or Clarification  Who is calling: Hattie from [facility]  What medication are you calling about (include dose and sig)?: Oxycodone  Who prescribed the medication?: Dr. Oliveira  What is your question/concern?: The caller states Oxycodone 10MG prescription was received today is an early fill.  The patient received Oxycodone 15MG -#120 tablets on 4/6/20.   Requested Pharmacy: [facility]  Okay to leave a detailed message?: Yes  A verbal okay to the Pharmacy #[phone]

## 2021-06-07 NOTE — TELEPHONE ENCOUNTER
I am uncomfortable prescribing narcotics during pregnancy.  I am not sure if there is another provider willing to do so. Otherwise, her obgyn may have to make the switch until Dr. Fuentes returns.   I placed a referral to a pain clinic for her. Thanks.

## 2021-06-07 NOTE — TELEPHONE ENCOUNTER
Controlled Substance Refill Request  Medication Name:   Requested Prescriptions     Pending Prescriptions Disp Refills     oxyCODONE 15 mg TbOr 120 tablet 0     Sig: Take 15 mg by mouth every 6 (six) hours. Max of 4 tablets per day     Date Last Fill: 4/6/20  [facility]ed [facility]acy: [facility]  Submit electronically to pharmacy  Controlled Substance Agreement on file:   Encounter-Level CSA Scan Date - 04/21/2017:    Scan on 4/28/2017  8:34 AM by Martha Ordaz: [facility] NARCOTIC AGREEMENT        Last office visit:  3/19/20

## 2021-06-07 NOTE — TELEPHONE ENCOUNTER
Called pharmacy and authorized an early refill of oxycodone.  Called and notified patient of this.

## 2021-06-07 NOTE — TELEPHONE ENCOUNTER
Medication Question or Clarification  Who is calling: Patient  What medication are you calling about (include dose and sig)?:    Disp  Refills  Start  End     oxyCODONE (ROXICODONE) 10 mg immediate release tablet  180 tablet  0  4/23/2020      Sig: Take 1 tablet by mouth every 4 hours as needed for chronic pain. Max of 6 per day     Sent to pharmacy as: oxyCODONE 10 mg tablet (ROXICODONE)     Earliest Fill Date: 4/23/2020     E-Prescribing Status: Receipt confirmed by pharmacy (4/23/2020  4:05 PM CDT)         Who prescribed the medication?: Dr Mukherjee  What is your question/concern?: Pharmacy needs verbal authorization to fill this prescription early for the patient.  Please advise the pharmacy  [facility]ed [facility]acy: [facility] #[id number]  Okay to leave a detailed message?: Yes

## 2021-06-07 NOTE — PROGRESS NOTES
"Lizzy Tom is a 39 y.o. female who is being evaluated via a billable telephone visit.      The patient has been notified of following:     \"This telephone visit will be conducted via a call between you and your physician/provider. We have found that certain health care needs can be provided without the need for a physical exam.  This service lets us provide the care you need with a short phone conversation.  If a prescription is necessary we can send it directly to your pharmacy.  If lab work is needed we can place an order for that and you can then stop by our lab to have the test done at a later time.    If during the course of the call the physician/provider feels a telephone visit is not appropriate, you will not be charged for this service.\"         Lizzy Tom complains of    Chief Complaint   Patient presents with     URI     cough and sore throat  - fever x3 days     Concerns     discuss changing dose of pain medication       I have reviewed and updated the patient's Past Medical History, Social History, Family History and Medication List.    ALLERGIES  Latex and Morphine    Additional provider notes: Telephone visit was performed today to discuss medication management as well as symptoms of upper respiratory infection.  Patient has history of chronic pain syndrome with history of chronic facial pain as well as neck and back pain.  She is prescribed chronic opioid medication and does have a controlled substance agreement in place.  Unfortunately she has been struggling a lot lately with pain and has been missing several days of work.  She lost her job.  This led to difficulty paying her rent and she was informed by her landlord that she will be evicted.  She is currently in a little bit of limbo as she is not sure exactly what her plans are next.  She is hopeful that she will be able to come up with the rent payment by the end of the month and stay in her current residence.  If she is not able " to do that she may move [place] to [place] to stay with her significant other's aunt.  She is interested in making a change in her medication regimen.  She is currently prescribed oxycodone 10 mg to take 4 times daily and oxycodone extended release 10 mg to take at night.  The extended release formulation is very expensive and she has limited funds.  She is wondering if she can increase the dose of her short acting oxycodone to 15 mg and take this 4 times daily as an alternative option.  She previously took this regimen when she was working with a pain management specialist in [place] before she transferred care back to this clinic.  She has been on opioid pain medication for many years and tolerates them well.  She does not experience any significant adverse side effects and they do improve her pain and quality of life and allow her to do her necessary activities of daily living.  She also has been experiencing symptoms of upper respiratory infection.  She has had cough and sore throat as well as low-grade fever for past few days.  No known exposure to coronavirus or anybody who has tested positive for corona virus.  She has been out and about in the community and recently was at the grocery store stocking up on supplies.  She is not having any respiratory distress.  We reviewed her medications and allergies.  Review of systems is otherwise negative.  No other questions today.    Assessment/Plan:  1. Chronic pain syndrome  Due to cost concerns we will discontinue extended release oxycodone 10 mg tablets.  We will discontinue her regular release 10 mg tablets and replace this with oxycodone 15 mg regular release tablets and have her take these every 6 hours with a max of 4 tablets/day.  Refill was sent to pharmacy.  She was counseled on use of these medication and common side effects.  Recommend follow-up in approximately 1 month telephone for medication check discussed that at this time there is no current  testing available for coronavirus except for patients who are hospitalized.  Discussed that we  - oxyCODONE 15 mg TbOr; Take 15 mg by mouth every 6 (six) hours. Max of 4 tablets per day  Dispense: 120 tablet; Refill: 0    2. Viral upper respiratory tract infection  discussed that at this time there is no current testing available for coronavirus except for patients who are hospitalized.  Discussed that we are assuming that everybody who has symptoms of cough, fever and sore throat have COVID-19 and that they should treat symptomatically.  They should also isolate themselves from household contacts in the community for a minimum of 7 days and until they have been fever free for 72 hours and have had improvement in respiratory symptoms.  Discussed that if she does start to experience significant worsening of symptoms, particularly respiratory distress, she should either contact the care connection nurse triage or visit [email or web address] to do a virtual visit and receive the most up-to-date recommendations on care.        Phone call duration:  12 minutes    Oma Fuentes MD

## 2021-06-07 NOTE — TELEPHONE ENCOUNTER
Who is calling:  Pharmacy    Reason for Call:  States script was sent to two different pharmacy and per patient message her preferred was [facility].  kevin will cancel other script in [place].    Date of last appointment with primary care: 03/19/2020  Okay to leave a detailed message: Yes

## 2021-06-07 NOTE — TELEPHONE ENCOUNTER
Who is calling:  Patient    Reason for Call:    Pharmacy declining to fill script for Oxy, states it is to soon,  Needs PCP to do a over ride.    Date of last appointment with primary care: 04/23/2020    Okay to leave a detailed message: Yes

## 2021-06-07 NOTE — TELEPHONE ENCOUNTER
Please advise if ok for an early refill on oxycodone 10 mg due to dose adjustment?       There is also a [employer] message that was sent in regards to this.     Patient completed a virtual visit with Dr. Oliveira yesterday.

## 2021-06-07 NOTE — TELEPHONE ENCOUNTER
Who is calling:  Lizzy  Reason for Call:  Patient called stating that her OB Dr would like her to switch to 10 mg Oxycodone. Her current prescription is for 15 mg. She uses the [facility] in [place] on [address]  Date of last appointment with primary care: 3/19/2020  Okay to leave a detailed message: Yes

## 2021-06-07 NOTE — TELEPHONE ENCOUNTER
Refill sent in oxycodone.  Please remind her that she will need to establish with an obstetrical provider for further refills on her oxycodone as I do not manage this in pregnancy.  I have also sent in a prescription for naloxone which she should have on hand in case there is ever any accidental overdose.  As long as she is able to keep down fluids, I encouraged her to continue hydrating herself at home.  Do not go in for IV fluids unless absolutely necessary.  It may take a few days for the dry mouth feeling to go away.  I also suggested she try Gatorade or Powerade in a previous my chart message.

## 2021-06-08 NOTE — TELEPHONE ENCOUNTER
Who is calling:  [facility]  Reason for Call:  Caller stated they to get a verbal okay from Dr. Oliveira to approve the patient getting her refill picked up today. Caller stated the patient picked up the oxycodone on 4/24/20 and the earliest they will allow her to  is 5/21. Caller stated the patient told her that she is going to the [facility] patel today and would like to pick this Rx up today. Please call [facility] back about this.  Date of last appointment with primary care: 4/23/20  Okay to leave a detailed message: No

## 2021-06-08 NOTE — TELEPHONE ENCOUNTER
Reason contacted:  Medication refill  Information relayed:  Called and authorized an early refill of oxycodone due to travel per Dr. Oliveira.   Additional questions:  No  Further follow-up needed:  No  Okay to leave a detailed message:  No

## 2021-06-08 NOTE — TELEPHONE ENCOUNTER
Patient calling - says she was trying to schedule an in person visit for possible Lyme's disease.  Says she had a deer tick a month ago. Is not sure how long the deer tick was attached but it was swollen. For the last 2 weeks she has felt tired, run down and achy.  Has had a slight headache off and on.      No fever.  No widespread rash.  No bullseye rash.    Triaged to disposition of Call PCP Within 24 Hours.  Transferred patient to scheduling to set up virtual visit per current nurse triage protocol.  Scheduled for tomorrow morning.    Felisa Kaur RN  Triage Nurse Advisor      COVID 19 Nurse Triage Plan/Patient Instructions    Please be aware that novel coronavirus (COVID-19) may be circulating in the community. If you develop symptoms such as fever, cough, or SOB or if you have concerns about the presence of another infection including coronavirus (COVID-19), please contact your health care provider or visit [email or web address].     Disposition/Instructions    Patient to schedule a Virtual Visit with provider. Reference Visit Selection Guide.    Thank you for taking steps to prevent the spread of this virus.  o Limit your contact with others.  o Wear a simple mask to cover your cough.  o Wash your hands well and often.    Resources    [facility]: About COVID-19: [email or web address]    CDC: What to Do If You're Sick: www.cdc.gov/coronavirus/2019-ncov/about/steps-when-sick.html    CDC: Ending Home Isolation: www.cdc.gov/coronavirus/2019-ncov/hcp/disposition-in-home-patients.html     CDC: Caring for Someone: www.cdc.gov/coronavirus/2019-ncov/if-you-are-sick/care-for-someone.html     [facility]: Interim Guidance for Hospital Discharge to Home: www.health.[facility].[place]/diseases/coronavirus/hcp/hospdischarge.pdf    [facility] clinical trials (COVID-19 research studies): [email or web address].[email or web address]/umn-clinical-trials     Below are the COVID-19 hotlines at the [place] [facility] of [facility] ([facility]).  Interpreters are available.   o For health questions: Call 839-760-0763 or 1-534.213.9877 (7 a.m. to 7 p.m.)  o For questions about schools and childcare: Call 490-884-4238 or 1-612.184.2783 (7 a.m. to 7 p.m.)       Reason for Disposition    [1] Probable deer tick AND [2] attached > 36 hours (or tick appears swollen, not flat) AND [3] occurred in an area where Lyme disease is common    Additional Information    Negative: Sounds like a life-threatening emergency to the triager    Negative: Not a tick bite    Negative: [1] 2 to 14 days following tick bite AND [2] severe headache with fever occurs    Negative: [1] 2 to 14 days following tick bite AND [2] widespread rash with fever occurs    Negative: Patient sounds very sick or weak to the triager    Negative: [1] Fever AND [2] red area    Negative: [1] Fever AND [2] area is very tender to touch    Negative: [1] Red streak or red line AND [2] length > 2 inches (5 cm)    Negative: Can't remove live tick (after trying Care Advice)    Negative: Can't remove tick's head that was broken off in the skin (after trying Care Advice)    Negative: [1] 2 to 14 days following tick bite AND [2] fever AND [3] no rash or headache    Negative: [1] 2 to 14 days following tick bite AND [2] widespread rash or headache AND [3] no fever    Negative: [1] Red or very tender (to touch) area AND [2] started over 24 hours after the bite    Negative: Red ring or bull's-eye rash occurs at tick bite    Protocols used: TICK BITE-A-

## 2021-06-08 NOTE — TELEPHONE ENCOUNTER
Patient Returning Call  Reason for call:  Patient called back.  Information relayed to patient:  n/a  Patient has additional questions:  Yes  If YES, what are your questions/concerns:  Calling on the status of this medication.  Please address and call patient when sent.  Okay to leave a detailed message?: Yes

## 2021-06-08 NOTE — PROGRESS NOTES
Subjective:    Lizzy Tom is seen today for prolonged illness.  Symptoms over past several weeks.  States that she felt this poorly at New Year's melisa as well.  Sore throat.  Cough somewhat productive.  Continues to smoke.  30 weeks pregnant currently.  Coughs to the point of vomiting previously.  Some loose stool however this has improved.  No rash.  No history of recurrent respiratory illnesses etc.  Followed through pain clinic for chronic pain syndrome management associated with lower back and jaw pain etc.    Past Surgical History   Procedure Laterality Date     Breast biopsy       Mandible surgery       Reconstruction mandible / maxilla  2015        Family History   Problem Relation Age of Onset     Cancer Father      lung     Diabetes Maternal Grandfather      Cancer Maternal Grandfather      Prostate cancer Maternal Grandfather      No Medical Problems Son         History reviewed. No pertinent past medical history.     Social History   Substance Use Topics     Smoking status: Former Smoker     Packs/day: 0.50     Years: 17.00     Types: Cigarettes     Quit date: 7/1/2016     Smokeless tobacco: Never Used     Alcohol use No        Current Outpatient Prescriptions   Medication Sig Dispense Refill     lidocaine (XYLOCAINE) 5 % ointment Apply small amount to bilateral jaw up to 3 times daily. 35.44 g 1     nicotine (NICODERM CQ) 14 mg/24 hr Place 1 patch on the skin daily. 30 patch 1     oxyCODONE (OXYCONTIN) 10 mg 12 hr tablet Take 1 tablet (10 mg total) by mouth every 8 (eight) hours. Take OxyContin 1 po TID 90 tablet 0     Oxycodone HCl 10 mg Tab Take 10 mg by mouth 3 (three) times a day as needed. Take 1 tablets by mouth three times  as needed for back and jaw pain. 90 each 0     azithromycin (ZITHROMAX) 250 MG tablet Take 2 tabs on day one, and then 1 tab on days 2-5. 6 tablet 0     No current facility-administered medications for this visit.           Objective:    Vitals:    01/11/17 1108   BP:  96/44   Patient Site: Right Arm   Patient Position: Sitting   Cuff Size: Adult Regular   Pulse: 80   Resp: 16   Temp: 98.1  F (36.7  C)   TempSrc: Oral   SpO2: 99%   Weight: 153 lb 9.6 oz (69.7 kg)      Body mass index is 25.56 kg/(m^2).    Alert.  Mildly ill.  Nontoxic.  Harsh cough frequently.  No respiratory distress however.  No tachypnea.  HEENT exam with normal external auditory canals and tympanic membranes.  Nasopharynx with increased congestion noted.  Oropharynx with moist mucous membranes with postnasal drainage.  No tonsillar exudate.  Neck without significant cervical lymphadenopathy.  Chest without inspiratory crackles or expiratory wheeze.  Scattered rhonchi only.  Cardiac exam regular.      Assessment:    1. Acute sinusitis  azithromycin (ZITHROMAX) 250 MG tablet   2. Sore throat  Rapid Strep A Screen-Throat    Group A Strep, RNA Direct Detection, Throat         Plan:    Discussed sinusitis management.  Z-Ramon as directed.  Reviewed OTC products for current illness in regard to noted pregnancy.  Rapid strep negative with culture pending.  Ensure adequate hydration.  Smoking cessation recommended.  Follow up with worsening or nonimprovement.

## 2021-06-08 NOTE — PROGRESS NOTES
"Lizzy Tom is a 39 y.o. female who is being evaluated via a billable telephone visit.      The patient has been notified of following:     \"This telephone visit will be conducted via a call between you and your physician/provider. We have found that certain health care needs can be provided without the need for a physical exam.  This service lets us provide the care you need with a short phone conversation.  If a prescription is necessary we can send it directly to your pharmacy.  If lab work is needed we can place an order for that and you can then stop by our lab to have the test done at a later time.    Telephone visits are billed at different rates depending on your insurance coverage. During this emergency period, for some insurers they may be billed the same as an in-person visit.  Please reach out to your insurance provider with any questions.    If during the course of the call the physician/provider feels a telephone visit is not appropriate, you will not be charged for this service.\"    Patient has given verbal consent to a Telephone visit? Yes    What phone number would you like to be contacted at? [phone]    Patient would like to receive their AVS by AVS Preference: Renetta.        Assessment/Plan:     1. Tick bite, initial encounter  Lyme Antibody Lutz    HM1(CBC and Differential)   2. Pain  oxyCODONE (ROXICODONE) 10 mg immediate release tablet   3. Fatigue, unspecified type  Lyme Antibody Cascade    HM1(CBC and Differential)       Diagnoses and all orders for this visit:    Tick bite, initial encounter  -     Lyme Antibody Cascade; Future; Expected date: 06/11/2020  -     HM1(CBC and Differential); Future; Expected date: 06/11/2020  -We will schedule her for lab test to check Lyme's antibody cascade and hemogram.  Further recommendations will be made once results are available    Pain  -     oxyCODONE (ROXICODONE) 10 mg immediate release tablet; Take 1 tablet by mouth every 4 hours as " needed for chronic pain. Max of 6 per day  Dispense: 180 tablet; Refill: 0    Fatigue, unspecified type  -     Lyme Antibody Cascade; Future; Expected date: 06/11/2020  -     HM1(CBC and Differential); Future; Expected date: 06/11/2020             Subjective:      Lizzy Tom is a 39 y.o. female who is evaluated today via telephone encounter for concern about possible Lyme's disease.  She is approximately 29 weeks pregnant with EDC of August 23, 2020.  States that about 3 to 4 weeks ago she pulled a deer tick off of her leg.  She reports that it was engorged.  She is not sure how long it had been attached.  About a week and half later she started noticing some increased fatigue.  She did have a little bit of a red spot around the site of the tick bite but otherwise has not noticed a rash.  She initially thought the fatigue was because she had been in the process of moving but now the fatigue is continued.  She has not had fevers or chills.  No flulike symptoms.  However she has had some headaches as well as nausea.  She does have chronic pain and has noticed increased joint pains.  She reports that she did have an appointment with the perinatologist at [facility] she was told that she did not need to be followed by a perinatologist during the pregnancy but that she should be delivered by a perinatologist.  She is working to find someone out in [facility] that she will be able to establish care with.  She is on chronic oxycodone.  This has been prescribed by Dr. Oliveira during her pregnancy.  She does request refill on that today.  She has no other concerns or questions.  Medications and allergies are reviewed and updated.  She reports pregnancy is going well.    Current Outpatient Medications   Medication Sig Dispense Refill     naloxone (NARCAN) 4 mg/actuation nasal spray 1 spray (4 mg dose) into one nostril for opioid reversal. Call 911. May repeat if no response in 3 minutes. 1 Box 0      oxyCODONE (ROXICODONE) 10 mg immediate release tablet Take 1 tablet by mouth every 4 hours as needed for chronic pain. Max of 6 per day 180 tablet 0     No current facility-administered medications for this visit.        Past Medical History, Family History, and Social History reviewed.  No past medical history on file.  Past Surgical History:   Procedure Laterality Date     BREAST BIOPSY       LAPAROSCOPIC APPENDECTOMY  03/30/2020     MANDIBLE SURGERY       RECONSTRUCTION MANDIBLE / MAXILLA  2015     Latex and Morphine  Family History   Problem Relation Age of Onset     Cancer Father         lung     Diabetes Maternal Grandfather      Cancer Maternal Grandfather      Prostate cancer Maternal Grandfather      No Medical Problems Son      Social History     Socioeconomic History     Marital status: Single     Spouse name: Not on file     Number of children: Not on file     Years of education: Not on file     Highest education level: Not on file   Occupational History     Not on file   Social Needs     Financial resource strain: Not on file     Food insecurity     Worry: Not on file     Inability: Not on file     Transportation needs     Medical: Not on file     Non-medical: Not on file   Tobacco Use     Smoking status: Current Some Day Smoker     Packs/day: 0.50     Years: 17.00     Pack years: 8.50     Types: Cigarettes     Last attempt to quit: 7/1/2016     Years since quitting: 3.9     Smokeless tobacco: Never Used     Tobacco comment: 2 per day   Substance and Sexual Activity     Alcohol use: No     Drug use: Yes     Types: Oxycodone     Sexual activity: Not Currently     Partners: Male     Birth control/protection: None     Comment: single   Lifestyle     Physical activity     Days per week: Not on file     Minutes per session: Not on file     Stress: Not on file   Relationships     Social connections     Talks on phone: Not on file     Gets together: Not on file     Attends Zoroastrianism service: Not on file      Active member of club or organization: Not on file     Attends meetings of clubs or organizations: Not on file     Relationship status: Not on file     Intimate partner violence     Fear of current or ex partner: Not on file     Emotionally abused: Not on file     Physically abused: Not on file     Forced sexual activity: Not on file   Other Topics Concern     Not on file   Social History Narrative     Not on file         Review of systems is as stated in HPI, and the remainder of the 10 system review is otherwise negative.    Objective:     There were no vitals filed for this visit. There is no height or weight on file to calculate BMI.    Exam: She is alert.  She is speaking clearly.  She does not sound short of breath      This note has been dictated using voice recognition software. Any grammatical or context distortions are unintentional and inherent to the the software.       Phone call duration:  10 minutes    Oma Fuentes MD
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
## 2021-06-08 NOTE — TELEPHONE ENCOUNTER
Medication Question or Clarification  Who is calling: Patient   What medication are you calling about (include dose and sig)?: oxyCODONE (ROXICODONE) 10 mg immediate release tablet  180 tablet  0  5/18/2020     Sig: Take 1 tablet by mouth every 4 hours as needed for chronic pain. Max of 6 per day Who prescribed the medication?:  Netta Moyer   What is your question/concern?: Patient states she is not able to fill the oxycodone Rx [facility] states its two days early to refill they need providers approval . Please advise .  Requested [facility]acy: [facility]   Okay to leave a detailed message?: No
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
## 2021-06-09 NOTE — TELEPHONE ENCOUNTER
Please call pt and ask if she has been able to establish care with a physician where she is now living

## 2021-06-09 NOTE — TELEPHONE ENCOUNTER
"Medication Request  Medication name:   oxyCODONE (ROXICODONE) 10 mg immediate release tablet  180 tablet  0  6/11/2020      Sig: Take 1 tablet by mouth every 4 hours as needed for chronic pain. Max of 6 per day     Sent to pharmacy as: oxyCODONE 10 mg tablet (ROXICODONE)     Earliest Fill Date: 6/11/2020     Notes to Pharmacy: Ok to refill early due to travel     E-Prescribing Status: Receipt confirmed by pharmacy (6/11/2020  8:19 AM CDT)         [id number]  Reason for request:   Patient is requesting above script thru Dr Oliveira.  When did you use medication last?:    Currently taking.  Patient offered appointment:  No  Okay to leave a detailed message: yes      Patient states \"Dr. Oliveira is handling my medication now.\"    Patient would like to  today.  "

## 2021-06-09 NOTE — TELEPHONE ENCOUNTER
Please let patient know that refill is denied.  It is too soon for refill.  Also, she has been advised multiple times that I am not comfortable managing her narcotic pain medication during pregnancy and she has been advised to establish care with an OB provider who is comfortable doing this.  I gave her a one-time refill last month as Dr. Oliveira was not available and Dr. Oliveira had previously refilled her prescription.

## 2021-06-09 NOTE — TELEPHONE ENCOUNTER
Controlled Substance Refill Request  Medication Name:   Requested Prescriptions     Pending Prescriptions Disp Refills     oxyCODONE (ROXICODONE) 10 mg immediate release tablet 180 tablet 0     Sig: Take 1 tablet by mouth every 4 hours as needed for chronic pain. Max of 6 per day     Date Last Fill: 6/11/20  [facility]  Submit electronically to pharmacy  Controlled Substance Agreement on file:   Encounter-Level CSA Scan Date - 04/21/2017:    Scan on 4/28/2017  8:34 AM by Martha Ordaz: [facility] NARCOTIC AGREEMENT        Last office visit:  6/11/20

## 2021-06-09 NOTE — TELEPHONE ENCOUNTER
Controlled Substance Refill Request  Medication Name:   Requested Prescriptions     Pending Prescriptions Disp Refills     oxyCODONE (ROXICODONE) 10 mg immediate release tablet 180 tablet 0     Sig: Take 1 tablet by mouth every 4 hours as needed for chronic pain. Max of 6 per day     Date Last Fill: 7/6/20  [facility]  Submit electronically to pharmacy  Controlled Substance Agreement on file:   Encounter-Level CSA Scan Date - 04/21/2017:    Scan on 4/28/2017  8:34 AM by Martha Ordaz: [facility] NARCOTIC AGREEMENT        Last office visit:  6/11/20

## 2021-06-09 NOTE — TELEPHONE ENCOUNTER
Who is calling:  Patient  Reason for Call:  Patient stated the oxycodone and the Zofran were sent to the wrong [facility]. See the patient's Ivaldit message.  Date of last appointment with primary care: 6/11/20  Okay to leave a detailed message: No

## 2021-06-09 NOTE — TELEPHONE ENCOUNTER
Who is calling:  Lizzy Pompa  Reason for Call:  Pt is requesting an update on this Refill Request - as Pt is already out of this med.  Date of last appointment with primary care: 6/11/2020  Okay to leave a detailed message: Yes

## 2021-06-09 NOTE — TELEPHONE ENCOUNTER
FYI - Status Update  Who is Calling: Patient  Update: Patient stated she wants this message to go to Dr. Oliveira and not Dr. Fuentes, for the refill of the oxycodone. Patient stated Dr. Oliveira took over on prescribing her the oxycodone since Oma Fuentes MD stopped prescribing it after the patient got pregnant. Patient is aware Oma Fuentes MD is her PCP and not Dr. Oliveira.  Okay to leave a detailed message?:  No

## 2021-06-10 ENCOUNTER — COMMUNICATION - HEALTHEAST (OUTPATIENT)
Dept: FAMILY MEDICINE | Facility: CLINIC | Age: 41
End: 2021-06-10

## 2021-06-10 DIAGNOSIS — G89.4 CHRONIC PAIN SYNDROME: ICD-10-CM

## 2021-06-10 NOTE — TELEPHONE ENCOUNTER
Attempted to reach pt to discuss opioid use and wean. No answer, left message. We did not receive information regarding OB in [place] and visits there.
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
## 2021-06-10 NOTE — TELEPHONE ENCOUNTER
I called pt in return but no answer. I left a vm letting pt know that Dr. Oliveira has completed the medication request and sent it to [facility].

## 2021-06-10 NOTE — TELEPHONE ENCOUNTER
Controlled Substance Refill Request  Medication Name:   Requested Prescriptions     Pending Prescriptions Disp Refills     oxyCODONE (ROXICODONE) 10 mg immediate release tablet 180 tablet 0     Sig: Take 1 tablet by mouth every 4 hours as needed for chronic pain. Max of 6 per day     Date Last Fill: 07/23/2020  Is patient out of medication?: No, 1 days left  Patient notified refills processed within 3 business days:  Yes  [facility]  Submit electronically to pharmacy  Controlled Substance Agreement on file:   Encounter-Level CSA Scan Date - 04/21/2017:    Scan on 4/28/2017  8:34 AM by Martha Ordaz: [facility] NARCOTIC AGREEMENT        Last office visit:  06/11/2020

## 2021-06-10 NOTE — TELEPHONE ENCOUNTER
Who is calling:    Patient    Reason for Call:    Requesting status to her medication refill request with weaning instructions?  States she is waiting for PCP to respond.    Date of last appointment with primary care: 06/11/2020    Okay to leave a detailed message: Yes    Please call patient to discuss medication management and send script if appropriate.

## 2021-06-10 NOTE — TELEPHONE ENCOUNTER
Who is calling:  Patient   Reason for Call:  Calling to check on below request. Patient is waiting @ [employer] in her car. Please advise asap!  Date of last appointment with primary care: 6/11/2020  Okay to leave a detailed message: Yes,[phone]

## 2021-06-10 NOTE — PROGRESS NOTES
Lizzy Tom is a 40-year-old pregnant female with known history of chronic narcotic use.  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  Prior to the pregnancy, she was maintained on OxyContin twice daily.  With her last pregnancy, she was switched per patient report to OxyContin immediate release every 4-6 hours as needed for pain.  She did well through that pregnancy and her daughter is now 3.  She is a primary patient of Dr. Oma Fuentes.  At the start of the pregnancy, she had a consultation with perinatology.  She was advised that they do not manage chronic pain and that it would need to be done through her primary care office.  After reviewing her chart and through conversations, she was provided a prescription and referred to the pain clinic and also to an OV to discuss a opioid agonist as a possibility for a safer option during the pregnancy.  These visits were not scheduled and she continue to follow with perinatology.  Her chronic pain medications were refilled.  She then moved to [facility], around the [place] area.  She is established in the last couple days now with her new OB who will be delivering for her.  She plans to have records sent to the clinic.  Unfortunately over the last several weeks, she has had quite a bit of nausea and vomiting and will frequently vomit up her pain medications.  [place] an appointment with the pain clinic for more than 2 weeks.  She received a prescription for 180 tablets on July 23.  She states because multiple times she will throw up the medication she has gone through these and needs a refill.    I am concerned about her need for an early refill.  Unfortunately she is pregnant, withdrawing from these medications can be very dangerous for both her and the baby.  I have asked her to have records sent to me for review  to assure that she has established prenatal care in her area and she agrees to have it sent tomorrow.  We discussed the risks of withdrawal for both her and the baby.  We also discussed the risks of continued opioid use and the anticipated  withdrawal at delivery.  She felt it was reasonable to proceed with a taper not off but decreasing the total intake over the next couple weeks until delivery.  We discussed the symptoms of withdrawal . A prescription is written for a taper every 4 to 5 days.  We will be checking in with her weekly.  She is going to attempt to get an earlier appointment with the pain clinic there.  I discussed the importance of being seen with any acute nausea vomiting and dehydration or symptoms of dehydration.

## 2021-06-10 NOTE — TELEPHONE ENCOUNTER
Who is calling:  Patient  Reason for Call:  Pt calling for update on pain medication request.  Please advise.  Date of last appointment with primary care: N/A  Okay to leave a detailed message: No

## 2021-06-10 NOTE — TELEPHONE ENCOUNTER
Called pt to see how she was doing with taper.  Did not answer. Left message for her to call with any concerns or signs of withdrawal

## 2021-06-10 NOTE — TELEPHONE ENCOUNTER
"Summer from [facility] in [facility] is calling. She reports patient is at the pharmacy to  her Oxycodone prescription that they received today. Caller wants to verify that it is okay to fill the prescription because patient received 180 tablets of Oxycodone on 7/23/2020.     Warm transferred caller to a staff member at the [facility] who states she will connect the pharmacy to Dr Oliveira.     Margy Bynum RN/M [facility]      Reason for Disposition    Pharmacy calling with prescription questions and triager unable to answer question    Additional Information    Negative: Drug overdose and nurse unable to answer question    Negative: Caller requesting information not related to medicine    Negative: Caller requesting a prescription for Strep throat and has a positive culture result    Negative: Rash while taking a medication or within 3 days of stopping it    Negative: Immunization reaction suspected    Negative: [1] Asthma and [2] having symptoms of asthma (cough, wheezing, etc)    Negative: MORE THAN A DOUBLE DOSE of a prescription or over-the-counter (OTC) drug    Negative: [1] DOUBLE DOSE (an extra dose or lesser amount) of over-the-counter (OTC) drug AND [2] any symptoms (e.g., dizziness, nausea, pain, sleepiness)    Negative: [1] DOUBLE DOSE (an extra dose or lesser amount) of prescription drug AND [2] any symptoms (e.g., dizziness, nausea, pain, sleepiness)    Negative: Took another person's prescription drug    Negative: [1] DOUBLE DOSE (an extra dose or lesser amount) of prescription drug AND [2] NO symptoms (Exception: a double dose of antibiotics)    Negative: Diabetes drug error or overdose (e.g., insulin or extra dose)    Negative: [1] Request for URGENT new prescription or refill of \"essential\" medication (i.e., likelihood of harm to patient if not taken) AND [2] triager unable to fill per unit policy    Negative: [1] Prescription not at pharmacy AND [2] " was prescribed today by PCP    Protocols used: MEDICATION QUESTION CALL-A-AH

## 2021-06-11 NOTE — TELEPHONE ENCOUNTER
I will send in clindamycin for her tooth.  She will have to wait until next refill for the dose reduction in oxycodone.

## 2021-06-11 NOTE — TELEPHONE ENCOUNTER
Pt calling to request a stronger antibiotic for a tooth infection. States she took Amox 500mg three times a day for 10 days and it didn't touch the infection. She is wanting something stronger.    Also states pain level is better and would like to go back down to the 10 mg of the oxycodone.     Please send prescriptions to [facility] if appropriate

## 2021-06-11 NOTE — TELEPHONE ENCOUNTER
Prescription resent to [facility].  Please cancel the prescription that was sent to [facility] target

## 2021-06-11 NOTE — TELEPHONE ENCOUNTER
New Appointment Needed  What is the reason for the visit:    establish care  Provider Preference: Dr Oliveira.  Patient is asking if she can change to have Dr Oliveira as her primary and how soon can patient be seen. Please advise.   How soon do you need to be seen?: asap  Waitlist offered?: No  Okay to leave a detailed message:  Yes

## 2021-06-11 NOTE — TELEPHONE ENCOUNTER
Medication Question or Clarification  Who is calling: Patient  What medication are you calling about (include dose and sig)?:   oxyCODONE 15 mg TbOr  42 tablet  0  9/14/2020      Sig - Route: Take 15 mg by mouth every 4 (four) hours. Take for chronic jaw pain, pain associated with gallstones and spinal headache following epidural for labor - Oral         Who prescribed the medication?: Oma Fuentes MD  What is your question/concern?: [facility] listed [facility]. Requesting prescription be submitted to [facility] in [place], [place].  [facility]  Okay to leave a detailed message?: Yes

## 2021-06-11 NOTE — PROGRESS NOTES
Subjective:   Lizzy Tom is a 36 y.o. female who presents for evaluation of pain. Patient was last seen 4/21/17.      Major issues:  1. Chronic pain syndrome    2. Pain    3. Disorder of jaw    4. Low back pain        CC: Pain  See rooming evaluation    HPI:   Impact of pain treatments:   Analgesia: fair  ADL's: Work: She is looking for employment.  Household: She is getting chores completed.  Family hx: Baby is doing very well. She is 3 months  AE's: none  Aberrant behavior: none    Aggravating factors: stress  Alleviating factors: medication, heat and ice, icyhot  DME: She still has the braces  Location/Laterality of the pain: jaw soreness  Timing: constant  Quality: throbbing, tight, burning  Severity:8/10    Activities Impaired by Increasing Pain Severity: F= 3  3-Enjoy  4-Work, Enjoy  5-Active, Mood Work Enjoy  6-Sleep, Active, Mood Work Enjoy  7-Walk, Sleep, Active, Mood Work Enjoy  8-Relate, Walk, Sleep, Active, Mood Work Enjoy      Medication:   Last opioid dose was Oxycodone 10 mg taken on 06/09/17 @ 130p.    Current Outpatient Prescriptions:      L norgest/e.estradiol-e.estrad (CAMRESE LO) 0.10 mg-20 mcg (84)/10 mcg (7) 3MPk, Take 1 tablet by mouth daily., Disp: 84 tablet, Rfl: 3     levonorgestrel-ethinyl estradiol (NORDETTE) 0.15-0.03 mg per tablet, Take 1 tablet by mouth daily., Disp: 84 tablet, Rfl: 3     lidocaine (XYLOCAINE) 5 % ointment, Apply small amount to bilateral jaw up to 3 times daily., Disp: 35.44 g, Rfl: 1 - has some left and is using this on and off     nicotine (NICODERM CQ) 14 mg/24 hr, Place 1 patch on the skin daily., Disp: 30 patch, Rfl: 1     oxyCODONE (ROXICODONE) 10 mg immediate release tablet, Take 1 tablet (10 mg total) by mouth every 4 (four) hours as needed (max of 6 per day). for back and jaw pain., Disp: 180 tablet, Rfl: 0      Record: 1/5/2012 record review in Archives under image notes; records from [facility]. 11/3/2008 [place] [facility] in Archives under  "image notes.    Review of Systems   Constitutional- + activity intolerance  Musculoskeletal- + pain  Neuro- - cognitive changes  GI/-  - constipation, - urinary difficulty  Psych-  - mood disorders,  - taking medication in a fashion other than prescribed      Objective:     Vitals:    06/09/17 1445   BP: 122/79   Pulse: 88   Resp: 20   Weight: 143 lb (64.9 kg)   Height: 5' 5\" (1.651 m)   PainSc:   8       Constitutional:  Pleasant and cooperative female who presents w/ her 3 month old infant today.   Psychiatric: Mood and affect are appropriate for the situation, setting and topic of discussion.  Patient does not appear sedated.  Integumentary:  Observed skin WNL  HEENT: EOM's grossly intact.    Chest: Breathing is non-labored.   Neurological:  Alert and oriented in all spheres including: time, place, person and situation.    Assessment:   Lizzy Tom is a 36 y.o. 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 It is noted she has been see at [facility]. [facility] feb 2016 noted a normal MRI..    I reduced the pt by about 20% 1 tab of the oxycodone. I would like to see how she will do with a lower dose. She has been on opioids for several years for jaw pain. It may be appropriate to consider myofacial release and craniosacral therapy again.     She is looking for employment as a .    I have no records from the surgery at the [facility].    Interventional:    9/1/2015 at [facility] with Dr. Alejandra which included bilateral mandibular ostomies  4/12/16 TPI w/ Dr. Block TMJ and upper traps (upper traps assistive)  Medication hx: oxycodone, cyclobenzaprine, naproxen, gabapentin (too sleepy); MS Contin (itching); OxyContin (insurance issues); Lidocaine (not " assistive); toradol injections (assistive); ibuprofen, hydrocodone; Tylenol #3    Plan:   Plan/NextSteps:     Please note there are 2 Sarah's at the [facility]. Today you saw Sarah Mccrary when communicating any needs please specify the last name. Thank you    Medication:   Medication prescribed today oxycodone 10mg max of 5 per day (6/15-7/15 then  Fill 7/13 for use from 7/15-8/14)    REFILL INSTRUCTIONS:  Please contact the clinic refill line 7 days before your refill is due. Speak clearly; note cell phones cut in-and-out and poor quality speech and reception issues will influence our ability to hear you and be efficient with your prescription.     Call [phone] leave:   Your name (first and last w/ spelling)   Date of birth  Name of all the medication(s) being requested  Dose of the medication(s)   How you are taking the medication (eg. twice per day etc).     Contact your pharmacy 3 (three) days after leaving your message to see if your prescription has been received. Please request the pharmacy check your profile to be certain about any concerns with a script failing to be received. Note: Katy updates have been inconsistent.  If the script has not been received there may have been a problem with the communication please reach back out to the clinic.      Health Maintenance: per primary care    Records: Reviewed to assist with preparation for the office visit and are reflected throughout the note.    Follow up: 2 months    Education: Please call Monday-Friday for problems or questions and one of the clinical support staff (CSS) will hel p to get things figured out. The number is [phone]. Some folks are using [employer] to send and e-mail. Please remember some issues require an office visit.     Reviewed the plan of care, provided justification and answered questions with the patient.     SAFETY REMINDERS  No alcohol while taking controlled substances. Alcohol is not an illegal substance, it  is unsafe to use in combination. It is a build up of substances in the body that can be extremely hazardous and may cause respirations to slow to a dangerous rate resulting in hospitalization, brain damage, or death.    Opioid medications have been associated with sharp rise in unintentional overdose and death.  Overdose is a condition characterized by the consumption in excess of a particular drug causing adverse effects. This can happen b/c you are sick, accidentally or intentionally took an extra dose, are on multiple medication that can interact. Someone took your medication and they are not use to the medication.  Symptoms of overdose include:   !breathing slow and shallow, erratic or not at all  !pinpoint pupils, hallucinations  !confusion  !muscle jerks, slack muscles   !extreme sleepiness or loss of alertness   !awake but not able to talk   !face pale or clammy, vomiting, for lighter skinned people, the skin tone turns bluish purple, for darker skinned people, it turns grayish or ashen   If in a situation where overdose is a concern engage the emergency response system (dial 911).    In one study it was noted that 80% of unintentional overdoses occurred in people who were taking a combination of opioids and benzodiazepines.    Do not sell, loan, borrow or share your opioid medication with anyone. Deaths have occurred as a result of this practice. It is illegal and patients are being prosecuted.     Prevent unexpected access/loss of medication: Keep medication locked. Only carry what you need with you.    *Universal Precautions:    UDS/Swab- 9/16/16   Consent/Agreement- 4/28/16  Pharmacy- as documented    - 6/8/17  Count- n/a  Psychological evaluation DA Dario 4/7/16  MME-   MTM: n/a  Pharmacogenetic testing- n/a    Management of opioid medication is inherently a moderate to high complex medial interaction based on the risk management required at each contact r/t risks and side effects.    Patient Arrived @  1436 for a 1440 appointment.     TT: [phone] - 3093  CT: over half spent in education and counseling as outlined in the plan.      Fauzia Mccrary APRN FNP-BC  [address] [facility] [address]   [phone]  [phone]

## 2021-06-11 NOTE — TELEPHONE ENCOUNTER
Pt called states she will need her 10 mg Oxycodone tomorrow, states she has been taking more of it when she had her tooth pain. Requesting today because  is out tomorrow. Please advised

## 2021-06-11 NOTE — PROGRESS NOTES
"Lizzy Tom is a 40 y.o. female who is being evaluated via a billable telephone visit.      The patient has been notified of following:     \"This telephone visit will be conducted via a call between you and your physician/provider. We have found that certain health care needs can be provided without the need for a physical exam.  This service lets us provide the care you need with a short phone conversation.  If a prescription is necessary we can send it directly to your pharmacy.  If lab work is needed we can place an order for that and you can then stop by our lab to have the test done at a later time.    Telephone visits are billed at different rates depending on your insurance coverage. During this emergency period, for some insurers they may be billed the same as an in-person visit.  Please reach out to your insurance provider with any questions.    If during the course of the call the physician/provider feels a telephone visit is not appropriate, you will not be charged for this service.\"    Patient has given verbal consent to a Telephone visit? Yes    What phone number would you like to be contacted at? [phone]  Pt is living in [place]     Patient would like to receive their AVS by AVS Preference: Renetta.        Assessment/Plan:     1. Calculus of gallbladder without cholecystitis without obstruction  oxyCODONE (ROXICODONE) 10 mg immediate release tablet   2. Hydronephrosis, right     3. Hepatomegaly     4. Postpartum spinal headache     5. Chronic pain syndrome  oxyCODONE (ROXICODONE) 10 mg immediate release tablet   6. Fatty liver         Diagnoses and all orders for this visit:    Calculus of gallbladder without cholecystitis without obstruction  Encouraged her to see a general surgeon as soon as possible to discuss having gallbladder removed.  She is having a lot of pain and did have elevation of her alkaline phosphatase on liver function panel that was last checked about 1 month ago.  Continue " to follow a bland diet.    Hydronephrosis, right  This could have been related to kidney stones that have not passed.  Patient was encouraged to have this rechecked when she establishes care with new PCP    Hepatomegaly  Patient was unaware of this finding.  Discussed she should discuss this with new PCP    Postpartum spinal headache  Encouraged her to establish with new PCP as soon as possible so that she can get referral for interventional radiology.  Discussed that a blood patch could potentially be performed to help with her spinal headache    Chronic pain syndrome  -     oxyCODONE (ROXICODONE) 10 mg immediate release tablet; Take 1-2 tablets by mouth every 4 hours as needed for pain due to gallstones  Dispense: 72 tablet; Refill: 0 she has been referred to a pain clinic  -In [facility] near where she is living currently.  She will contact them this afternoon to schedule an appointment.  Asked her to notify me of when her appointment is scheduled.  Will provide 1 week supply of her oxycodone.  She was counseled on use of this medication and side effects.    Fatty liver  Follow-up with new PCP    Gallstones  -     oxyCODONE (ROXICODONE) 10 mg immediate release tablet; Take 1-2 tablets by mouth every 4 hours as needed for pain due to gallstones  Dispense: 72 tablet; Refill: 0             Subjective:      Lizzy Tom is a 40 y.o. female who is evaluated today via telephone encounter for follow-up on pain management.  She is approximately 3 weeks postpartum.  Daughter was born on 8/20/2020 via vaginal delivery.  Patient states that pregnancy was quite complicated.  She did have kidney stones in the weeks before delivery.  She reports that those have not passed and she is not having any further issues.  She was diagnosed with gallstones.  Had an ultrasound performed in early August.  Those results are available in care everywhere and are reviewed.  Cholelithiasis was present.  Also noted was  hepatomegaly and fatty changes within the liver as well as right hydronephrosis.  Patient states that they were very concerned that she was going to get very sick because her C-reactive protein level was also elevated.  She did deliver her baby and has established care with a PCP in [facility] where she is currently living.  However this PCP is attempting to wean her off of chronic opioid pain medications very quickly.  Patient is not comfortable with this process and is planning to establish care with a pain clinic.  She did receive a referral from Dr. Werner a couple of days ago.  She has been in touch with his pain clinic but was advised to call back this afternoon to schedule an appointment.  Patient does have another clinic that she is looking at to establish care and take over management of her pain medications.  Patient did run out of her oxycodone yesterday and was given a 2-day supply by this provider.  Patient states she is still currently having a lot of pain in the right upper abdomen due to the gallstones.  States there is always a dull ache but sometimes pain will become more intense.  She has been trying to follow a bland diet.  She will have an episode of vomiting every couple of days.  She also has her chronic jaw pain and she has multiple joint pains.  She is currently taking 20 mg of oxycodone about every 4 hours.  She is also taking 800 mg of ibuprofen every 8 hours and alternating this with 1000 mg of acetaminophen every 8 hours.  This current regimen does keep her pain adequately controlled and allows her to be available to care for her  daughter as well as her other daughter who is 3-1/2.  Patient is also in the process of moving and plans to move into a new place this weekend.  She is currently staying in a camper.  We reviewed and updated her medications and allergies.  Review of systems is otherwise negative.    Current Outpatient Medications   Medication Sig Dispense Refill      acetaminophen (TYLENOL) 500 MG tablet Take 1,000 mg by mouth.       ibuprofen (ADVIL,MOTRIN) 200 MG tablet Take 200 mg by mouth.       ondansetron (ZOFRAN) 4 MG tablet Take 1 tablet (4 mg total) by mouth daily as needed for nausea. 30 tablet 0     oxyCODONE (ROXICODONE) 10 mg immediate release tablet Take 1-2 tablets by mouth every 4 hours as needed for pain due to gallstones 72 tablet 0     naloxone (NARCAN) 4 mg/actuation nasal spray 1 spray (4 mg dose) into one nostril for opioid reversal. Call 911. May repeat if no response in 3 minutes. 1 Box 0     No current facility-administered medications for this visit.        Past Medical History, Family History, and Social History reviewed.  No past medical history on file.  Past Surgical History:   Procedure Laterality Date     BREAST BIOPSY       LAPAROSCOPIC APPENDECTOMY  2020     MANDIBLE SURGERY       RECONSTRUCTION MANDIBLE / MAXILLA  2015     Latex and Morphine  Family History   Problem Relation Age of Onset     Cancer Father         lung     Diabetes Maternal Grandfather      Cancer Maternal Grandfather      Prostate cancer Maternal Grandfather      No Medical Problems Son      Social History     Socioeconomic History     Marital status: Single     Spouse name: Not on file     Number of children: Not on file     Years of education: Not on file     Highest education level: Not on file   Occupational History     Not on file   Social Needs     Financial resource strain: Not on file     Food insecurity     Worry: Not on file     Inability: Not on file     Transportation needs     Medical: Not on file     Non-medical: Not on file   Tobacco Use     Smoking status: Current Some Day Smoker     Packs/day: 0.50     Years: 17.00     Pack years: 8.50     Types: Cigarettes     Last attempt to quit: 2016     Years since quittin.1     Smokeless tobacco: Never Used     Tobacco comment: 2 per day   Substance and Sexual Activity     Alcohol use: No     Drug use:  Yes     Types: Oxycodone     Sexual activity: Not Currently     Partners: Male     Birth control/protection: None     Comment: single   Lifestyle     Physical activity     Days per week: Not on file     Minutes per session: Not on file     Stress: Not on file   Relationships     Social connections     Talks on phone: Not on file     Gets together: Not on file     Attends Sabianism service: Not on file     Active member of club or organization: Not on file     Attends meetings of clubs or organizations: Not on file     Relationship status: Not on file     Intimate partner violence     Fear of current or ex partner: Not on file     Emotionally abused: Not on file     Physically abused: Not on file     Forced sexual activity: Not on file   Other Topics Concern     Not on file   Social History Narrative     Not on file         Review of systems is as stated in HPI, and the remainder of the 10 system review is otherwise negative.    Objective:     There were no vitals filed for this visit. There is no height or weight on file to calculate BMI.    Exam: She is alert.  She is speaking clearly.  She does not sound short of breath      This note has been dictated using voice recognition software. Any grammatical or context distortions are unintentional and inherent to the the software.             Phone call duration:  17 minutes    Oma Fuentes MD

## 2021-06-11 NOTE — TELEPHONE ENCOUNTER
New Appointment Needed  What is the reason for the visit:    Same Date/Next Day Appt Request  What is the reason for your visit?: Patient would like to schedule a video visit sometime this week with the PCP. Patient said that they are out of medication and need to see the provider before it can be refilled.    Provider Preference: PCP only  How soon do you need to be seen?: tomorrow  Waitlist offered?: No  Okay to leave a detailed message:  Yes

## 2021-06-11 NOTE — TELEPHONE ENCOUNTER
Prabha, Please call pt and see if she can set up virtual visit this week. Please find out what she is currently taking for pain meds.  I may be willing to send in short term supply.

## 2021-06-11 NOTE — TELEPHONE ENCOUNTER
Prescription was previously sent to [facility] in [place].  I do not believe that this prescription has been canceled.

## 2021-06-11 NOTE — TELEPHONE ENCOUNTER
Medication Question or Clarification  Who is calling: Patient  What medication are you calling about (include dose and sig)?:   oxyCODONE 15 mg TbOr  42 tablet  0  9/18/2020      Sig - Route: Take 15 mg by mouth every 4 (four) hours. Take for chronic jaw pain, pain associated with gallstones and spinal headache following epidural for labor - Oral         Who prescribed the medication?: Oma Fuentes MD  What is your question/concern?: [facility] medication in [facility]. Requesting prescription be submitted to [facility] in [place], [place].  [facility]ed [facility]  Okay to leave a detailed message?: Yes

## 2021-06-11 NOTE — TELEPHONE ENCOUNTER
Medication Question or Clarification  Who is calling: Patient  What medication are you calling about (include dose and sig)?:   oxyCODONE (ROXICODONE) 10 mg immediate release tablet  72 tablet  0  9/10/2020      Sig: Take 1-2 tablets by mouth every 4 hours as needed for pain due to gallstones     Sent to pharmacy as: oxyCODONE 10 mg tablet (ROXICODONE)         Who prescribed the medication?:    Oma Fuentes MD       What is your question/concern?: [facility] is calling to inform provider patient picked up the exact prescription on 08/27/2020 from a provider at [facility]. A quantity of #180 was dispensed. Questioning if they should move forward and dispense the prescription that was submitted today. Please advise.  [facility]ed [facility]: [facility]  Okay to leave a detailed message?: No

## 2021-06-11 NOTE — TELEPHONE ENCOUNTER
That prescription was 2 weeks ago. Pt is taking 12 tablets per day so that prescription has run out.  OK to fill prescription written today.

## 2021-06-11 NOTE — TELEPHONE ENCOUNTER
I scheduled an appointment for patient on Friday with you. She is wanting to get worked into your schedule tomorrow.  Please advise

## 2021-06-11 NOTE — TELEPHONE ENCOUNTER
Sorry, I meant send to [facility] not [facility]. RX was canceled at [facility]

## 2021-06-11 NOTE — TELEPHONE ENCOUNTER
Prior Authorization Request  Who s requesting:  Pharmacy  Pharmacy Name and Location: [facility]   Medication Name: oxyCODONE 15 mg TbOr   Insurance Plan: [facility]  Insurance Member ID Number:  [id number]  CoverMyMeds Key: [id number]  Informed patient that prior authorizations can take up to 10 business days for response:   NA  Okay to leave a detailed message: Yes

## 2021-06-11 NOTE — TELEPHONE ENCOUNTER
Central PA team  [phone]  Pool: LIZ PA MED ([id number])          PA has been initiated.       PA form completed and faxed insurance via Cover My Meds     Key:  [id number]     Medication:  oxyCODONE HCl 15MG tablets    Insurance:  [place] Medicaid        Response will be received via fax and may take up to 5-10 business days depending on plan

## 2021-06-11 NOTE — TELEPHONE ENCOUNTER
I called and cancelled the prescription from [facility]. Please resend to [facility] per patient request.

## 2021-06-11 NOTE — TELEPHONE ENCOUNTER
Changed appointment to tele visit  9/10/2020 at 1:40 PM with Dr Fuentes     Pt is currently taking 10-20 mg oxycodone every 4 hours for gallstones     mg every 6 hours    Tylenol 325 mg two tablets every 8 hours     [facility]

## 2021-06-11 NOTE — TELEPHONE ENCOUNTER
Please cancel prescription for oxycodone 15 mg tablets that was sent to the [facility] in [place].  Patient [facility]

## 2021-06-11 NOTE — TELEPHONE ENCOUNTER
Called and personally spoke with patient.  She is having several different types of pain currently she has her chronic pain related to her jaw for which she was taking chronic opioids.  She also has a hypermobility syndrome.  She recently delivered her baby and is now at home but during that process developed 2 additional concerns.  She states that she has gallstones which are causing quite a bit of pain for her.  She also has what she is describing as a spinal headache after her epidural.  She is currently taking ibuprofen 800 mg 3 times a day.  She is also taking Tylenol at thousand milligrams 3 times a day.  She does have a supply of oxycodone but her current primary up there is weaning her off and she is stressed because she states that she is going to run out tomorrow.  She states that she moved because it is a better quality life for her and for her family so I discouraged her from moving back to the [facility] solely for the reason of pain management.  I gave her today the number for the [facility] pain medicine clinic in [place] where she currently is located.  Encouraged her to contact her current doctors that are caring for her there.  We reinforced the over-the-counter pain medications that are an option.  We discussed that if her gallbladder pain is getting to be more severe, that she may need to be seen as it can become infected.  Her primary is Dr. Fuentes who was previously managing her pain medications.  I discussed that typically we would not manage remotely but will certainly talk with her primary doctor about her current concerns.

## 2021-06-12 NOTE — PROGRESS NOTES
Results reviewed and no further action needed.    Nimesh Calle, [facility]  Internal Medicine  [facility]  8/22/2017, 3:33 PM

## 2021-06-12 NOTE — PROGRESS NOTES
Subjective:   Lizzy Tom is a 37 y.o. female who presents for evaluation of pain. See rooming evaluation. Patient was last seen 6/9/17.     CC: Pain. Review of current status. Patient general opinion of symptoms management and function is fair she is working, packing to relocate and caring for her infant.     Major issues:  1. Chronic pain syndrome    2. Pain    3. Disorder of jaw    4. Jaw pain    5. Low back pain          Patient Active Problem List   Diagnosis     Restless leg     Vitamin D Deficiency     Excessive Bleeding During Period (Menorrhagia)     Muscle pain     Anxiety, generalized     Disorder of jaw     Low back pain     Sciatic Radiculopathy     Joint Pain, Localized In The Knee     Joint Pain, Localized In The Left Shoulder     Insomnia     Snoring (Symptom)     Leukocytosis     Skin sensation disturbance     Sudden Redness Of The Skin (Flushing)     Hematuria     Atypical Chest Pain     Jaw pain     Narcotic drug use           HPI  Impact of pain treatments:   Analgesia: fair  ADL's: Work: She is working and will be down to working 2 days per week. She has state insurance and she will be relocating to [place]. Household: They have found a place to live the house will close in September. Social: She has needed to relocate in  Very aggressive fashion due to a closure on her mothers home.   AE's: none  Aberrant behavior: none    Aggravating factors: standing, packing, lifting the baby  Alleviating factors: medication, pacing as best she is able  DME: She still has the braces  Location/Laterality of the pain: jaw, low back pain into the right leg  Timing: constant  Quality: throbbing, burning  Severity:8/10    Activities Impaired by Increasing Pain Severity: F= 6  3-Enjoy  4-Work, Enjoy  5-Active, Mood Work Enjoy  6-Sleep, Active, Mood Work Enjoy  7-Walk, Sleep, Active, Mood Work Enjoy  8-Relate, Walk, Sleep, Active, Mood Work Enjoy      Medication:   Last opioid dose was Oxycodone last taken  "07/31/17 @ 930a      Current Outpatient Prescriptions:      hydrOXYzine (VISTARIL) 25 MG capsule, Take 1-2 capsules by mouth 4 times a day, Disp: 40 capsule, Rfl: 0     L norgest/e.estradiol-e.estrad (CAMRESE LO) 0.10 mg-20 mcg (84)/10 mcg (7) 3MPk, Take 1 tablet by mouth daily., Disp: 84 tablet, Rfl: 3     levonorgestrel-ethinyl estradiol (NORDETTE) 0.15-0.03 mg per tablet, Take 1 tablet by mouth daily., Disp: 84 tablet, Rfl: 3     lidocaine (XYLOCAINE) 5 % ointment, Apply small amount to bilateral jaw up to 3 times daily., Disp: 35.44 g, Rfl: 1 - not useing     oxyCODONE (ROXICODONE) 10 mg immediate release tablet, Take 1 tablet (10 mg total) by mouth every 4 (four) hours as needed (max of 5 per day). for back and jaw pain., Disp: 150 tablet, Rfl: 0     tiZANidine (ZANAFLEX) 2 MG tablet, Take 1-2 tablets (2-4 mg total) by mouth every 6 (six) hours as needed., Disp: 120 tablet, Rfl: 0 - not using too fatigued    Review of Systems   Constitutional- some activity intolerance  Musculoskeletal- + pain  Neuro- - cognitive changes  Psych-  - taking medication in a fashion other than prescribed    Social  Family History   Problem Relation Age of Onset     Cancer Father      lung     Diabetes Maternal Grandfather      Cancer Maternal Grandfather      Prostate cancer Maternal Grandfather      No Medical Problems Son      History   Smoking Status     Former Smoker     Packs/day: 0.50     Years: 17.00     Types: Cigarettes     Quit date: 7/1/2016   Smokeless Tobacco     Never Used     History   Alcohol Use No     History   Drug Use     Yes     Special: Oxycodone     History   Sexual Activity     Sexual activity: Not Currently     Partners: Male     Birth control/ protection: None     Comment: single       Objective:     Vitals:    07/31/17 1136   BP: 121/72   Pulse: 86   Resp: 16   Weight: 145 lb (65.8 kg)   Height: 5' 5\" (1.651 m)   PainSc:   8       Constitutional:  Pleasant and cooperative female who presents w/ her infant " daughter today.  Beautiful connection b/t mother and child.   Psychiatric: Mood and affect are appropriate for the situation, setting and topic of discussion.  Patient does not appear sedated.  Integumentary:  Observed skin WNL  HEENT: EOM's grossly intact.    Chest: Breathing is non-labored.   Neurological:  Alert and oriented in all spheres including: time, place, person and situation.    Diagnostics:   Lab:  Last UDT on 9/16/16.      Imaging:  No new imaging available to review    :  Dated 7/31/2017    Assessment:   Lizzy Tom is a 37 y.o. 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 It is noted she has been see at [facility]. [facility] that was being managed by [facility] in feb 2016 noted a normal MRI.    She is employment as a .     I have no records from the surgery at the [facility].    The reduction in the opioid done at her last visit was bumpy and we have discussed my concern about mental health and stressors and the need to address the stressors as well. I will hold on a reduction today but did explain we do need to be reducing to safer levels and the goal is always the lowest effective level.    Hx of LBP/SIJ pain treated by Spine in the past.    She is relocating to [place] I did educate her some folks have needed to change pain centers when relocating to [place] due to insurance.      *Universal Precautions:    UDS/Swab- 9/16/16   Consent/Agreement- 4/28/16  Pharmacy- as documented    Count- n/a  Psychological evaluation DA [facility] 4/7/16 7/31/17 MME- 75  MTM: n/a  Pharmacogenetic testing- n/a    Management of opioid medication is inherently a moderate to high complex medial interaction based on the risk management required at each contact r/t risks and side effects.      Medication hx:  oxycodone, cyclobenzaprine, naproxen, gabapentin (too sleepy); MS Contin (itching); OxyContin (insurance issues); Lidocaine (not assistive); toradol injections (assistive); ibuprofen, hydrocodone; Tylenol #3  Plan:   Plan/NextSteps:     Follow up: 2 months      Education:   Please note there are 2 Sarah's at the [facility]. Today you saw Sarah Mccrary when communicating any needs please specify the last name. Thank you    Please call Monday-Friday for problems or questions and one of the clinical support staff (CSS) will help to get things figured out. The number is [phone]. Some folks are using [employer] to send an e-mail ([employer] message). Please remember some issues require an office visit.     Today we reviewed the plan of care and answered questions.      Health Maintenance: Please continue to see primary care for general medical prevention and illness care.    Mental Health: I would like to have you work with either Rachele or Dario. I think it is important to look at you as a whole person. Ou have been through a lot of change since I met you in addition to the pain you have lived with.    Interventional: we could keep injections available I know you worked with [facility]    Records: Reviewed to assist with preparation for the office visit and are reflected throughout the note.    Medication:   Medication prescribed today     Requested Prescriptions     Pending Prescriptions Disp Refills     oxyCODONE (ROXICODONE) 10 mg immediate release tablet Fill 08/11/17 for use from 8/12-9/11 150 tablet 0     Sig: Take 1 tablet (10 mg total) by mouth every 4 (four) hours as needed (max of 5 per day). for back and jaw pain.       REFILL INSTRUCTIONS:  Please contact the clinic refill line 7 days before your refill is due. Speak clearly; note cell phones cut in-and-out and poor quality speech and reception issues will influence our ability to hear you and be efficient with your prescription.     Call  [phone] leave:   Your name (first and last w/ spelling)   Date of birth  Name of all the medication(s) being requested  Dose of the medication(s)   How you are taking the medication (eg. twice per day etc).     Contact your pharmacy 3 (three) days after leaving your message to see if your prescription has been received. Please request the pharmacy check your profile to be certain about any concerns with a script failing to be received. Note: Katy updates have been inconsistent.  If the script has not been received there may have been a problem with the communication please reach back out to the clinic.       SAFETY REMINDERS  No alcohol while taking controlled substances. Alcohol is not an illegal substance, it is unsafe to use in combination. It is a build up of substances in the body that can be extremely hazardous and may cause respirations to slow to a dangerous rate resulting in hospitalization, brain damage, or death.    Unintentional overdose and death.    People are not always in perfect health. Sometimes the body is weak or compromised because of an illness like the flu, pneumonia, low bood etc. When the body struggles, even though a person is taking their medication as prescribed, the medication may be too much for the body to handle. Combinations of medication can also put a person at high risk. In one study it was noted that 80% of unintentional overdoses occurred in people who were taking a combination of opioids and benzodiazepines.    A big concern would be if someone else got your medication. Your medication is not prescribed to anyone other than you. Your medication could cause harm or death to someone else.    Naloxone is a rescue medication. A person may need the rescue medication if experiencing an unexpected overdose. The medication is meant to give a person a break by lifting off one burden, the opioid narcotic medication. Medical care is required because the a persons body is compromised  and needs further testing and assistance.    Symptoms of overdose include:   !breathing slow and shallow, erratic or not at all  !pinpoint pupils, hallucinations  !confusion  !muscle jerks, slack muscles   !extreme sleepiness or loss of alertness   !awake but not able to talk   !face pale or clammy, vomiting, for lighter skinned people, the skin tone turns bluish purple, for darker skinned people, it turns grayish or ashen   If in a situation where overdose is a concern engage the emergency response system (dial 911).    Do not sell, loan, borrow or share your opioid medication with anyone. Deaths have occurred as a result of this practice. It is illegal and patients are being prosecuted.     Prevent unexpected access/loss of medication: Keep medication locked. Only carry what you need with you.      Patient Arrived @ 1113 for a 1120 appointment.     TT: 1159 - 1202  CT: over half spent in education and counseling as outlined in the plan.      Fauzia CASILLAS FN-BC  [address] [address]   [phone]  [phone]

## 2021-06-12 NOTE — PROGRESS NOTES
Assessment/Plan:     1. Gastroenteritis     2. Diarrhea  Culture, Stool    Ova and Parasite, Stool       Diagnoses and all orders for this visit:    Gastroenteritis    Diarrhea  -     Culture, Stool  -     Ova and Parasite, Stool  -Has not had diarrhea since taking Imodium.  Discussed differential diagnosis.  Could be viral gastroenteritis.  Could have acquired infection such as C. 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  She is mildly dehydrated but blood pressure and vital signs are stable and she is not experiencing venous or lightheadedness. Emphasized importance of oral rehydration and discussed importance of adequate fluid intake.  Will provide prescription for Zofran to use to help with nausea.  She may be able to increase her intake of food and fluids with antinausea medication.  Discussed taking frequent small amounts of fluid.  Suggested flat ginger ale or 7 up as an alternative to water.  Recommend bland diet.  Suggest stopping the Imodium within the next day or so to see if loose stools return.  If she has recurrence of loose stools, will proceed with collection of sample for C. difficile and stool culture as well as ova and parasites.  Note was provided to remain off of work through the weekend.  She will follow-up if symptoms do not improve, worsening of symptoms or new concerning symptoms develop.    Other orders  -     L norgest/e.estradiol-e.estrad (CAMRESE LO) 0.10 mg-20 mcg (84)/10 mcg (7) 3MPk; Take 1 tablet by mouth daily.  Dispense: 84 tablet; Refill: 3  -     ondansetron (ZOFRAN ODT) 4 MG disintegrating tablet; Take 1 tablet (4 mg total) by mouth every 8 (eight) hours as needed for nausea.  Dispense: 12 tablet; Refill: 0           Subjective:      Lizzy Tom is a 37 y.o. female comes in today to follow-up on diarrhea.  Symptoms have been going on now for about 5 days.  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 difficile.  She is concerned that she may have contracted an infection as result of her work in the lab.  She was seen at the walk-in clinic 2 days ago.  In association with the diarrhea she has had abdominal cramping, primarily in the lower abdomen.  She has had nausea but no vomiting.  She has felt weak and fatigued.  Has not been able to eat or drink much because this causes increased discomfort in the stomach.  She has not had bloody stools.  She is concerned she may be getting dehydrated as her urine has been dark in color and she has had decreased urine output.  She has not had dizziness or lightheadedness.  She has not had dysuria or hematuria.  No urinary urgency or frequency.  Has not had any recent antibiotics.  Has not had blood in her stools.  Has had chills but no fevers.  Has been trying to increase her fluid intake.  Does not like the taste of Gatorade or Powerade.  Currently taking about 4 glasses of water per day.  At the walk-in clinic, hemogram was performed which showed mild elevation of her white blood cell count.  She has had mild elevation of white blood cell count in the past.  A C. difficile test was ordered but she has not yet provided a stool sample.  She has been taking Imodium and that has stopped her diarrhea.  She is wondering if she should get an order for a stool culture as well.  We reviewed allergies and medications.  She also needs a refill of her birth control pills.  No other concerns or questions today.  Remainder of review of systems is negative.    Current Outpatient Prescriptions   Medication Sig Dispense Refill     L norgest/e.estradiol-e.estrad (CAMRESE LO) 0.10 mg-20 mcg (84)/10 mcg (7) 3MPk Take 1 tablet by mouth daily. 84 tablet 3     [START ON 8/12/2017] oxyCODONE (ROXICODONE) 10 mg immediate release tablet Take 1 tablet (10 mg total) by mouth every 4 (four) hours as needed (max of 5 per  day). for back and jaw pain. 150 tablet 0     hydrOXYzine (VISTARIL) 25 MG capsule Take 1-2 capsules by mouth 4 times a day 40 capsule 0     levonorgestrel-ethinyl estradiol (NORDETTE) 0.15-0.03 mg per tablet Take 1 tablet by mouth daily. 84 tablet 3     lidocaine (XYLOCAINE) 5 % ointment Apply small amount to bilateral jaw up to 3 times daily. 35.44 g 1     ondansetron (ZOFRAN ODT) 4 MG disintegrating tablet Take 1 tablet (4 mg total) by mouth every 8 (eight) hours as needed for nausea. 12 tablet 0     tiZANidine (ZANAFLEX) 2 MG tablet Take 1-2 tablets (2-4 mg total) by mouth every 6 (six) hours as needed. 120 tablet 0     No current facility-administered medications for this visit.        Past Medical History, Family History, and Social History reviewed.  History reviewed. No pertinent past medical history.  Past Surgical History:   Procedure Laterality Date     BREAST BIOPSY       MANDIBLE SURGERY       RECONSTRUCTION MANDIBLE / MAXILLA  2015     Latex and Morphine  Family History   Problem Relation Age of Onset     Cancer Father      lung     Diabetes Maternal Grandfather      Cancer Maternal Grandfather      Prostate cancer Maternal Grandfather      No Medical Problems Son      Social History     Social History     Marital status: Single     Spouse name: N/A     Number of children: N/A     Years of education: N/A     Occupational History     Not on file.     Social History Main Topics     Smoking status: Former Smoker     Packs/day: 0.50     Years: 17.00     Types: Cigarettes     Quit date: 7/1/2016     Smokeless tobacco: Never Used     Alcohol use No     Drug use: Yes     Special: Oxycodone     Sexual activity: Not Currently     Partners: Male     Birth control/ protection: None      Comment: single     Other Topics Concern     Not on file     Social History Narrative         Review of systems is as stated in HPI, and the remainder of the 10 system review is otherwise negative.    Objective:     Vitals:     08/11/17 1151   BP: 112/62   Patient Site: Right Arm   Patient Position: Sitting   Cuff Size: Adult Regular   Pulse: 80   Temp: 98.8  F (37.1  C)   TempSrc: Tympanic   SpO2: 98%   Weight: 141 lb 7 oz (64.2 kg)    Body mass index is 23.54 kg/(m^2).    General appearance: alert, appears stated age and cooperative  Head: Normocephalic, without obvious abnormality, atraumatic  Throat: mouth appears dry  Neck: supple, symmetrical, trachea midline  Lungs: clear to auscultation bilaterally  Heart: regular rate and rhythm, S1, S2 normal, no murmur, click, rub or gallop  Abdomen: Bowel sounds present all 4 quadrants, abdomen is soft, mild tenderness across lower abdomen, no rebound or guarding, no masses  Extremities: extremities normal, atraumatic, no cyanosis or edema  Neurologic: Grossly normal          This note has been dictated using voice recognition software. Any grammatical or context distortions are unintentional and inherent to the the software.

## 2021-06-12 NOTE — PROGRESS NOTES
"Lizzy Tom is a 40 y.o. female who is being evaluated via a billable telephone visit.      The patient has been notified of following:     \"This telephone visit will be conducted via a call between you and your physician/provider. We have found that certain health care needs can be provided without the need for a physical exam.  This service lets us provide the care you need with a short phone conversation.  If a prescription is necessary we can send it directly to your pharmacy.  If lab work is needed we can place an order for that and you can then stop by our lab to have the test done at a later time.    Telephone visits are billed at different rates depending on your insurance coverage. During this emergency period, for some insurers they may be billed the same as an in-person visit.  Please reach out to your insurance provider with any questions.    If during the course of the call the physician/provider feels a telephone visit is not appropriate, you will not be charged for this service.\"    Patient has given verbal consent to a Telephone visit? Yes    What phone number would you like to be contacted at? [phone]    Patient would like to receive their AVS by AVS Preference: Renetta.        Assessment/Plan:     1. Chronic pain syndrome  oxyCODONE (OXYCONTIN) 10 mg 12 hr tablet   2. Calculus of gallbladder without cholecystitis without obstruction  oxyCODONE (ROXICODONE) 10 mg immediate release tablet   3. Chronic jaw pain  oxyCODONE (ROXICODONE) 10 mg immediate release tablet   4. Ana Paula-Danlos syndrome  oxyCODONE (ROXICODONE) 10 mg immediate release tablet       Diagnoses and all orders for this visit:    Chronic pain syndrome  -     oxyCODONE (OXYCONTIN) 10 mg 12 hr tablet; Take 1 tablet by mouth at bedtime for chronic back and jaw pain  Dispense: 30 tablet; Refill: 0    Calculus of gallbladder without cholecystitis without obstruction  -     oxyCODONE (ROXICODONE) 10 mg immediate release tablet; " Take 1 tablet by mouth every 4 hours as needed for chronic pain. Max of 5 tablets per day.  Dispense: 168 tablet; Refill: 0    Chronic jaw pain  -     oxyCODONE (ROXICODONE) 10 mg immediate release tablet; Take 1 tablet by mouth every 4 hours as needed for chronic pain. Max of 5 tablets per day.  Dispense: 168 tablet; Refill: 0    Ana Paula-Danlos syndrome  -     oxyCODONE (ROXICODONE) 10 mg immediate release tablet; Take 1 tablet by mouth every 4 hours as needed for chronic pain. Max of 5 tablets per day.  Dispense: 168 tablet; Refill: 0       She does not need refill of oxycodone 10 mg tablets at this time.  She received a 1 month supply on 10/22/2020.  We will change her regimen slightly and put her back on OxyContin 10 mg at bedtime.  She will continue oxycodone 10 mg every 4 hours during the day but will not take more than 5 tablets during the day of the oxycodone 10 mg and she is now taking OxyContin 10 mg at bedtime.  Prescription was adjusted in her chart to reflect this but was not sent to her pharmacy today.  She will plan to make appointment to establish care with local pain management specialist as soon as her new insurance takes effect.  She will continue other nonopioid modalities to manage her chronic pain.  She continues to receive benefit from chronic pain medication and reduction of pain and improvement in function.  She is not experiencing any significant adverse side effects.  [place]  was also reviewed and no inappropriate findings were noted.        Subjective:      Lizzy Tom is a 40 y.o. female who is evaluated today via telephone encounter for medication check on chronic opioid pain medication.  Reviewed medications, allergies and updated the chart.  She reports that she is still waiting for her new [place] insurance card.  Once she gets that she is not planning to make an appointment with a pain management specialist in [facility] near where she is currently living.   Until then she does want to continue receiving her pain medication and pain management through this clinic.  She is currently on oxycodone 10 mg every 4 hours for management of chronic pain due to Ana Paula-Danlos syndrome, chronic back pain as well as chronic jaw pain.  She recently had some flareups of pain due to a kidney stone as well as gallstone.  States that since she passed the kidney stone she has not had any further issues with flank pain.  Her gallbladder has also not been giving her any problems recently.  She is still has not been able to address that because she is waiting for her [place] care to take effect.  She is now back to her standard pain medication regimen of oxycodone 10 mg every 4 hours.  This works well for her overall.  However she reports that her infant is now sleeping through the night and she is wondering if she can go back to taking a OxyContin 10 mg at bedtime so that she is not waking up in the middle of the night in pain.  She feels this would allow her to get better sleep and help her feel less tired during the day and function better.  Otherwise current regimen is working well.  She reports no adverse side effects.  Denies troubles with constipation.  No drowsiness.  No mental fogginess or confusion.  Feels that pain regimen adequately improves her pain so that she is able to care for her children and her house and do the activities that she needs to do during the day.  She is also using ibuprofen and Tylenol about twice daily.  She alternates these.  She is taking hot baths and using heat and ice as well to help manage her pain.  Review of systems is otherwise negative.  She has no other concerns or questions today.    Current Outpatient Medications   Medication Sig Dispense Refill     acetaminophen (TYLENOL) 500 MG tablet Take 1,000 mg by mouth.       ibuprofen (ADVIL,MOTRIN) 200 MG tablet Take 200 mg by mouth.       naloxone (NARCAN) 4 mg/actuation nasal spray 1 spray (4 mg  dose) into one nostril for opioid reversal. Call 911. May repeat if no response in 3 minutes. 1 Box 0     ondansetron (ZOFRAN) 4 MG tablet Take 1 tablet (4 mg total) by mouth daily as needed for nausea. 30 tablet 0     oxyCODONE (ROXICODONE) 10 mg immediate release tablet Take 1 tablet by mouth every 4 hours as needed for chronic pain. Max of 5 tablets per day. 168 tablet 0     oxyCODONE (OXYCONTIN) 10 mg 12 hr tablet Take 1 tablet by mouth at bedtime for chronic back and jaw pain 30 tablet 0     No current facility-administered medications for this visit.        Past Medical History, Family History, and Social History reviewed.  No past medical history on file.  Past Surgical History:   Procedure Laterality Date     BREAST BIOPSY       LAPAROSCOPIC APPENDECTOMY  2020     MANDIBLE SURGERY       RECONSTRUCTION MANDIBLE / MAXILLA  2015     Latex and Morphine  Family History   Problem Relation Age of Onset     Cancer Father         lung     Diabetes Maternal Grandfather      Cancer Maternal Grandfather      Prostate cancer Maternal Grandfather      No Medical Problems Son      Social History     Socioeconomic History     Marital status: Single     Spouse name: Not on file     Number of children: Not on file     Years of education: Not on file     Highest education level: Not on file   Occupational History     Not on file   Social Needs     Financial resource strain: Not on file     Food insecurity     Worry: Not on file     Inability: Not on file     Transportation needs     Medical: Not on file     Non-medical: Not on file   Tobacco Use     Smoking status: Current Some Day Smoker     Packs/day: 0.50     Years: 17.00     Pack years: 8.50     Types: Cigarettes     Last attempt to quit: 2016     Years since quittin.3     Smokeless tobacco: Never Used     Tobacco comment: 2 per day   Substance and Sexual Activity     Alcohol use: No     Drug use: Yes     Types: Oxycodone     Sexual activity: Not Currently      Partners: Male     Birth control/protection: None     Comment: single   Lifestyle     Physical activity     Days per week: Not on file     Minutes per session: Not on file     Stress: Not on file   Relationships     Social connections     Talks on phone: Not on file     Gets together: Not on file     Attends Confucianism service: Not on file     Active member of club or organization: Not on file     Attends meetings of clubs or organizations: Not on file     Relationship status: Not on file     Intimate partner violence     Fear of current or ex partner: Not on file     Emotionally abused: Not on file     Physically abused: Not on file     Forced sexual activity: Not on file   Other Topics Concern     Not on file   Social History Narrative     Not on file         Review of systems is as stated in HPI, and the remainder of the 10 system review is otherwise negative.    Objective:     There were no vitals filed for this visit. There is no height or weight on file to calculate BMI.    Exam: She is alert.  She is speaking clearly.  She does not sound short of breath.    This note has been dictated using voice recognition software. Any grammatical or context distortions are unintentional and inherent to the the software.       Phone call duration:  10 minutes    Oma Fuentes MD

## 2021-06-12 NOTE — TELEPHONE ENCOUNTER
"Pt calling, states she sent a [employer] message yesterday and again today requesting refill on pain meds.   Last refill of oxycodone 10 mg was 10/5/2020 #84 tablets. States she did pass a large kidney stone so most of pain is now gone. She would like to have refill of her \"regular\" dose pain medication filled today. She is in town waiting for refill.   "

## 2021-06-12 NOTE — PROGRESS NOTES
Care Guide called patient.  If this patient is returning our call please transfer to Myesha Sullivan at ext [phone].    9/11/17 PC # 3 to Lizzy MORENOMADIE    [facility]    8/4/17 PC #1 to Lizzy [facility] #1    I would like to refer Lizzy to the health care home-Dr. Fuentes

## 2021-06-13 ENCOUNTER — COMMUNICATION - HEALTHEAST (OUTPATIENT)
Dept: FAMILY MEDICINE | Facility: CLINIC | Age: 41
End: 2021-06-13

## 2021-06-13 DIAGNOSIS — K80.20 CALCULUS OF GALLBLADDER WITHOUT CHOLECYSTITIS WITHOUT OBSTRUCTION: ICD-10-CM

## 2021-06-13 DIAGNOSIS — Q79.60 EHLERS-DANLOS SYNDROME: ICD-10-CM

## 2021-06-13 DIAGNOSIS — G89.29 CHRONIC JAW PAIN: ICD-10-CM

## 2021-06-13 DIAGNOSIS — R68.84 CHRONIC JAW PAIN: ICD-10-CM
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
## 2021-06-13 NOTE — TELEPHONE ENCOUNTER
Patient Returning Call  Reason for call:  Patient calling back to see if Dr. Oliveira could please look at her message and call back.  Information relayed to patient:  n/a  Patient has additional questions:  No  If YES, what are your questions/concerns:  n/a  Okay to leave a detailed message?: Yes

## 2021-06-13 NOTE — TELEPHONE ENCOUNTER
RN cannot approve Refill Request    RN can NOT refill this medication med is not covered by policy/route to provider. Last office visit: 8/1/2019 Oma Fuentes MD Last Physical: Visit date not found Last MTM visit: Visit date not found Last visit same specialty: 8/1/2019 Oma Fuentes MD.  Next visit within 3 mo: Visit date not found  Next physical within 3 mo: Visit date not found      Michelle Shell, [facility] Triage/Med Refill 12/7/2020    Requested Prescriptions   Pending Prescriptions Disp Refills     oxyCODONE (ROXICODONE) 10 mg immediate release tablet 150 tablet 0     Sig: Take 1 tablet by mouth every 4 hours as needed for chronic pain. Max of 5 tablets per day.       Controlled Substances Refill Protocol Failed - 12/7/2020  9:27 AM        Failed - Route all Controlled Substance Requests to Provider        Failed - Patient has controlled substance agreement in past 12 months     Encounter-Level CSA Scan Date - 04/21/2017:    Scan on 4/28/2017  8:34 AM by Martha Ordaz: [facility] NARCOTIC AGREEMENT               Passed - Visit with PCP or prescribing provider visit in past 12 months      Last office visit with prescriber/PCP: 8/1/2019 Oma Fuentes MD OR same dept: Visit date not found OR same specialty: 8/1/2019 Oma Fuentes MD Last physical: Visit date not found Last MTM visit: Visit date not found    Next visit within 3 mo: Visit date not found  Next physical within 3 mo: Visit date not found  Prescriber OR PCP: Oma Fuentes MD  Last diagnosis associated with med order: 1. Calculus of gallbladder without cholecystitis without obstruction  - oxyCODONE (ROXICODONE) 10 mg immediate release tablet; Take 1 tablet by mouth every 4 hours as needed for chronic pain. Max of 5 tablets per day.  Dispense: 150 tablet; Refill: 0    2. Chronic jaw pain  - oxyCODONE (ROXICODONE) 10 mg immediate release tablet; Take 1 tablet by mouth every 4 hours as needed for chronic pain. Max of 5 tablets per day.  Dispense:  150 tablet; Refill: 0    3. Ana Paula-Danlos syndrome  - oxyCODONE (ROXICODONE) 10 mg immediate release tablet; Take 1 tablet by mouth every 4 hours as needed for chronic pain. Max of 5 tablets per day.  Dispense: 150 tablet; Refill: 0

## 2021-06-13 NOTE — TELEPHONE ENCOUNTER
Who is calling:  Patient   Reason for Call:  Caller called back again stating the samething, caller is needing this sent over as soon as possible today therefore she can  her medication along with all her kids so she does not have to make trips.   Date of last appointment with primary care:   Okay to leave a detailed message: Yes

## 2021-06-13 NOTE — TELEPHONE ENCOUNTER
Controlled Substance Refill Request  Medication Name:   Requested Prescriptions     Pending Prescriptions Disp Refills     oxyCODONE (OXYCONTIN) 10 mg 12 hr tablet 30 tablet 0     Sig: Take 1 tablet by mouth at bedtime for chronic back and jaw pain     Date Last Fill: 11/5/20  Requested Pharmacy: [facility]  Submit electronically to pharmacy  Controlled Substance Agreement on file:   Encounter-Level CSA Scan Date - 04/21/2017:    Scan on 4/28/2017  8:34 AM by Martha Ordaz: [facility] NARCOTIC AGREEMENT        Last office visit:  11/5/20

## 2021-06-13 NOTE — TELEPHONE ENCOUNTER
Controlled Substance Refill Request  Medication Name:   Requested Prescriptions     Pending Prescriptions Disp Refills     oxyCODONE (ROXICODONE) 10 mg immediate release tablet 150 tablet 0     Sig: Take 1 tablet by mouth every 4 hours as needed for chronic pain. Max of 5 tablets per day.     Date Last Fill: 11/10/2020  Is patient out of medication?: N/A - electronic request  Patient notified refills processed within 3 business days: N/A - electronic request  Requested Pharmacy: [facility] in [place], [place]   Submit electronically to pharmacy  Controlled Substance Agreement on file:   Encounter-Level CSA Scan Date - 04/21/2017:    Scan on 4/28/2017  8:34 AM by Martha Ordaz M: [facility] NARCOTIC AGREEMENT        Last office visit:  Virtual Visit with PCP Dr BRENDA Fuentes on 11/5/2020

## 2021-06-13 NOTE — TELEPHONE ENCOUNTER
Medication Question or Clarification  Who is calling: The patient   What medication are you calling about (include dose and sig)?: oxyCODONE (ROXICODONE) 10 mg immediate release tablet  Who prescribed the medication?: Oma Fuentes MD   What is your question/concern?: The patient is requesting Oma Fuentes MD to call the pharmacy and give an okay that the patient can get the prescription today because she is also on OxyContin that was filled on 11/5/20.  [facility]  Okay to leave a detailed message?: Yes

## 2021-06-14 ENCOUNTER — COMMUNICATION - HEALTHEAST (OUTPATIENT)
Dept: FAMILY MEDICINE | Facility: CLINIC | Age: 41
End: 2021-06-14

## 2021-06-14 DIAGNOSIS — G89.29 CHRONIC JAW PAIN: ICD-10-CM

## 2021-06-14 DIAGNOSIS — Q79.60 EHLERS-DANLOS SYNDROME: ICD-10-CM

## 2021-06-14 DIAGNOSIS — R68.84 CHRONIC JAW PAIN: ICD-10-CM

## 2021-06-14 DIAGNOSIS — K80.20 CALCULUS OF GALLBLADDER WITHOUT CHOLECYSTITIS WITHOUT OBSTRUCTION: ICD-10-CM

## 2021-06-14 DIAGNOSIS — G89.4 CHRONIC PAIN SYNDROME: ICD-10-CM

## 2021-06-14 NOTE — TELEPHONE ENCOUNTER
Reviewed messages. Pt should only be taking oxycontin 10 mg at bedtime.    She received oxycodone 10 mg regular release tablets on 12/7/20.   She should have 13 days left of pain meds if taking as prescribed.  I gave her permission yesterday to take an extra tablet per day if needed but advised her to do this sparingly.  With this extra tablet, she should still have adequate pain medication to last until next week.     If she is needing to use more than that, I strongly feel she should be seen for evaluation in person.  I understand she doesn't have insurance and I understand it is the holiday but it is her health that is most important.  Usually, they [facility] is able to work something out for patients who don't have insurance.

## 2021-06-14 NOTE — TELEPHONE ENCOUNTER
Left message to call back for: LM regarding message below.   Information to relay to patient:  Ok to discuss with pt if she returns call.

## 2021-06-14 NOTE — PROGRESS NOTES
Subjective:   Lizzy Tom is a 37 y.o. female who presents for evaluation of pain. See rooming evaluation. Patient was last seen 10/5/17     CC: Pain.     Major issues:  1. Jaw pain          Patient Active Problem List   Diagnosis     Restless leg     Vitamin D Deficiency     Excessive Bleeding During Period (Menorrhagia)     Muscle pain     Anxiety, generalized     Disorder of jaw     Low back pain     Sciatic Radiculopathy     Joint Pain, Localized In The Knee     Joint Pain, Localized In The Left Shoulder     Insomnia     Snoring (Symptom)     Leukocytosis     Skin sensation disturbance     Sudden Redness Of The Skin (Flushing)     Hematuria     Atypical Chest Pain     Jaw pain     Narcotic drug use       HPI:   Impact of pain treatments:   ADL's: Work: She is in the process of looking for employment She indicates she has needed to delay the start date due to issues with vehicles. Grooming: Patient is engaged with family and friends in a meaningful fashion.  Household: Patient is able to participate in general chores.  Social: She reports they are still unpacking. They relocated.     Aggravating factors: moving, unpacking, lifting and carrying the cr seat  Alleviating factors: medication, pacing  Location/Laterality of the pain: jaw pain  Timing: constant  Quality: aching, tight, shooting   Severity:8/10    Activities Impaired by Increasing Pain Severity: F= 6  3-Enjoy  4-Work, Enjoy  5-Active, Mood Work Enjoy  6-Sleep, Active, Mood Work Enjoy  7-Walk, Sleep, Active, Mood Work Enjoy  8-Relate, Walk, Sleep, Active, Mood Work Enjoy      Medication:   Last opioid dose was Oxycodone 11/9/17 @ 800 pm .      Current Outpatient Prescriptions:      cyclobenzaprine (FLEXERIL) 10 MG tablet, Take 1 tablet (10 mg total) by mouth 3 (three) times a day as needed for muscle spasms., Disp: 30 tablet, Rfl: 3     diclofenac sodium (VOLTAREN) 1 % Gel, Apply 4 g of gel topically to area of pain 4 times daily as needed, Disp:  100 g, Rfl: 3     DULoxetine (CYMBALTA) 30 MG capsule, Take 1 capsule by mouth daily for 1 week and then increase to 2 capsules daily., Disp: 60 capsule, Rfl: 2     hydrOXYzine (ATARAX) 50 MG tablet, Take 1-2 tablets every 6 hours as needed for anxiety or sleep, Disp: 60 tablet, Rfl: 3     L norgest/e.estradiol-e.estrad (CAMRESE LO) 0.10 mg-20 mcg (84)/10 mcg (7) 3MPk, Take 1 tablet by mouth daily., Disp: 84 tablet, Rfl: 3     levonorgestrel-ethinyl estradiol (NORDETTE) 0.15-0.03 mg per tablet, Take 1 tablet by mouth daily., Disp: 84 tablet, Rfl: 3     loperamide (IMODIUM A-D) 2 mg tablet, Take 1 tablet (2 mg total) by mouth 4 (four) times a day as needed for diarrhea., Disp: 30 tablet, Rfl: 1     naproxen (NAPROSYN) 500 MG tablet, Take 1 capsule by mouth twice daily as needed for pain, Disp: 60 tablet, Rfl: 3     ondansetron (ZOFRAN ODT) 4 MG disintegrating tablet, Take 1 tablet (4 mg total) by mouth every 8 (eight) hours as needed for nausea., Disp: 30 tablet, Rfl: 1     oxyCODONE (ROXICODONE) 10 mg immediate release tablet, Take 1 tablet (10 mg total) by mouth every 4 (four) hours as needed (max of 5 per day). for back and jaw pain., Disp: 150 tablet, Rfl: 0    Records: 10/26/17 Case Review communications reviewed.  She indicates that when she was moving she located a couple of pills around the bed. She indicates she thought the pill was a cyclobenzaprine. She is not certain when she last took the oxycodone she is wondering if she may have left it at her sisters home.       Review of Systems   Musculoskeletal- - pain  Neuro- - cognitive changes  HEENT-  + jaw pain  Psych- + taking medication in a fashion other than prescribed    Social  Family History   Problem Relation Age of Onset     Cancer Father      lung     Diabetes Maternal Grandfather      Cancer Maternal Grandfather      Prostate cancer Maternal Grandfather      No Medical Problems Son      History   Smoking Status     Current Some Day Smoker      "Packs/day: 0.50     Years: 17.00     Types: Cigarettes     Last attempt to quit: 7/1/2016   Smokeless Tobacco     Never Used     Comment: 2 per day     History   Alcohol Use No     History   Drug Use     Yes     Special: Oxycodone     History   Sexual Activity     Sexual activity: Not Currently     Partners: Male     Birth control/ protection: None     Comment: single       Objective:     Vitals:    11/13/17 1307   BP: 99/68   Pulse: (!) 107   Resp: 16   Weight: 142 lb 6 oz (64.6 kg)   Height: 5' 5\" (1.651 m)   PainSc:   8       Constitutional:  Pleasant and cooperative female who presents w/ her infant daughter today who is a happy thriving baby.   Psychiatric: Mood and affect are appropriate for the situation, setting and topic of discussion.  Patient does not appear sedated.  Integumentary:  Observed skin WNL  HEENT: EOM's grossly intact.    Chest: Breathing is non-labored.   Neurological:  Alert and oriented in all spheres including: time, place, person and situation.      Diagnostics:   Lab:  Last SWAB on 10/5/17.  Reviewed 10/5/17. Detected methadone (unexpected). Failed to detect oxycodone or metabolites (unexpected)     Imaging:  No new imaging available to review    :  Dated 11/13/2017 [place] and [place] both are ok    Assessment:   Lizzy Tom is a 37 y.o. 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 It is noted she has been see at [facility]. [facility] that was being managed by [facility] in feb 2016 noted a normal MRI.      The SWAB was unexpected. Pt indicates she found medication she thought was cyclobenzaprine and took the medication. She thinks this is where the methadone came from. It is noted when she had the methadone it was felt not to be effective. I was also reported to was " taken to a drop off site. She is not clear as to the justification for the lack of oxycodone. It is noted it was clarified with the lab that there was no oxycodone it was not below reportable levels. Will taper the medication this has been a plan for some time. Will monitor along the way. She is give a week of medication.      She is relocating to [place] no pharmacy changes have been made to date.      *Universal Precautions:    UDS/Swab- 9/16/16   Consent/Agreement- 4/28/16  Pharmacy- as documented    Count- n/a  Psychological evaluation DA Dario 4/7/16 7/31/17 MME- 75  11/13/17 MME=60  MTM: n/a  Pharmacogenetic testing- n/a      Management of opioid medication is inherently a moderate to high complex medial interaction based on the risk management required at each contact r/t risks and side effects.    Medication hx: oxycodone, cyclobenzaprine, naproxen, methadone (not effective), gabapentin (too sleepy); MS Contin (itching); OxyContin (insurance issues); Lidocaine (not assistive); toradol injections (assistive); ibuprofen, hydrocodone; Tylenol #3  Plan:   Plan/NextSteps:     Follow up: 4 weeks     Education:   Please note there are 2 Sarah's at the [facility]. Today you saw Sarah Mccrary when communicating any needs please specify the last name. Thank you    Please call Monday-Friday for problems or questions and one of the clinical support staff (CSS) will help to get things figured out. The number is [phone]. Some folks are using [employer] to send an e-mail ([employer] message). Please remember some issues require an office visit.     Today we reviewed the plan of care and answered questions.      Health Maintenance: Please continue to see primary care for general medical prevention and illness care.    Diagnostics:   Last SWAB on 10/5/17.  Reviewed 10/5/17. Detected methadone (unexpected). Failed to detect oxycodone or metabolites (unexpected) - We discussed you think you took a cyclobenzaprine any  pills found toss the pills away.  It is unclear why you did not have any of the oxycodone in your urine.     Records: Reviewed to assist with preparation for the office visit and are reflected throughout the note.    Medication:   Medication prescribed today  11/13-11/20 - the focus will be to decrease and discontinue the oxycodone.     Requested Prescriptions     Signed Prescriptions Disp Refills     oxyCODONE (ROXICODONE) 10 mg immediate release tablet 28 tablet 0     Sig: Take 1 tablet (10 mg total) by mouth every 6 (six) hours as needed for pain.       REFILL INSTRUCTIONS:  Please contact the clinic refill line 7 days before your refill is due. Speak clearly; note cell phones cut in-and-out and poor quality speech and reception issues will influence our ability to hear you and be efficient with your prescription.     Call [phone] leave:   Your name (first and last w/ spelling)   Date of birth  Name of all the medication(s) being requested  Dose of the medication(s)   How you are taking the medication (eg. twice per day etc).     Contact your pharmacy 3 (three) days after leaving your message to see if your prescription has been received. Please request the pharmacy check your profile to be certain about any concerns with a script failing to be received. Note: Katy updates have been inconsistent.  If the script has not been received there may have been a problem with the communication please reach back out to the clinic.       SAFETY REMINDERS  No alcohol while taking controlled substances. Alcohol is not an illegal substance, it is unsafe to use in combination. It is a build up of substances in the body that can be extremely hazardous and may cause respirations to slow to a dangerous rate resulting in hospitalization, brain damage, or death.    Unintentional overdose and death.    People are not always in perfect health. Sometimes the body is weak or compromised because of an illness like the flu, pneumonia,  low bood etc. When the body struggles, even though a person is taking their medication as prescribed, the medication may be too much for the body to handle. Combinations of medication can also put a person at high risk. In one study it was noted that 80% of unintentional overdoses occurred in people who were taking a combination of opioids and benzodiazepines.    A big concern would be if someone else got your medication. Your medication is not prescribed to anyone other than you. Your medication could cause harm or death to someone else.    Naloxone is a rescue medication. A person may need the rescue medication if experiencing an unexpected overdose. The medication is meant to give a person a break by lifting off one burden, the opioid narcotic medication. Medical care is required because the a persons body is compromised and needs further testing and assistance.    Symptoms of overdose include:   !breathing slow and shallow, erratic or not at all  !pinpoint pupils, hallucinations  !confusion  !muscle jerks, slack muscles   !extreme sleepiness or loss of alertness   !awake but not able to talk   !face pale or clammy, vomiting, for lighter skinned people, the skin tone turns bluish purple, for darker skinned people, it turns grayish or ashen   If in a situation where overdose is a concern engage the emergency response system (dial 911).    Do not sell, loan, borrow or share your opioid medication with anyone. Deaths have occurred as a result of this practice. It is illegal and patients are being prosecuted.     Prevent unexpected access/loss of medication: Keep medication locked. Only carry what you need with you.      Patient Arrived @ 1241 for a 1300 appointment.     TT: 1314 - 1329  CT: over half spent in education and counseling as outlined in the plan.       Fauzia Mccrary APRN [facility]  [address] [address]   [phone]  [phone]

## 2021-06-14 NOTE — TELEPHONE ENCOUNTER
"I am not comfortable refilling this medication. Per Dr. Fuentes (in response to Prieto Battery message initiated 10/21/2020 may take a single additional tab \"very sparingly\"-- I would not anticipate that she would then require a refill early as her refill of immediate release oxycodone would not otherwise be up for refill until 1/7/2021.  Must wait for Dr. Fuentes.    Please also clarify that patient should be taking oxycodone extended release twice daily and CANNOT take an additional tab of the extended release.    VJ  "

## 2021-06-14 NOTE — TELEPHONE ENCOUNTER
Controlled Substance Refill Request  Medication Name:   Requested Prescriptions     Pending Prescriptions Disp Refills     oxyCODONE (ROXICODONE) 10 mg immediate release tablet 150 tablet 0     Sig: Take 1 tablet by mouth every 4 hours as needed for chronic pain. Max of 5 tablets per day.     Date Last Fill: 12/7/20  [facility]  Submit electronically to pharmacy  Controlled Substance Agreement on file:   Encounter-Level CSA Scan Date - 04/21/2017:    Scan on 4/28/2017  8:34 AM by Martha Ordaz: [facility] NARCOTIC AGREEMENT        Last office visit:  11/5/20

## 2021-06-14 NOTE — TELEPHONE ENCOUNTER
Discussed DR Fuentes's message below with pt. Pt states she sent DR Fuentes another message a few minutes ago asking if she could take the extended release instead of the immediate release oxycodone and she is waiting for her to respond.  Pt advised the clinic is closing at noon today and will be closed tomorrow. Pt should have enough medication to get her through until next week.

## 2021-06-14 NOTE — TELEPHONE ENCOUNTER
I am unable to find documentation that Dr. Fuentes authorized patient to take additional oxycodone.  Will need to wait for Dr. Fuentes to review.

## 2021-06-14 NOTE — TELEPHONE ENCOUNTER
Controlled Substance Refill Request  Medication Name:   Requested Prescriptions     Pending Prescriptions Disp Refills     oxyCODONE (OXYCONTIN) 10 mg 12 hr tablet 30 tablet 0     Sig: Take 1 tablet by mouth at bedtime for chronic back and jaw pain     Date Last Fill: 12/28/2020  Is patient out of medication?: N/A - electronic request  Patient notified refills processed within 3 business days: N/A - electronic request  Requested Pharmacy: [id number]   Submit electronically to pharmacy  Controlled Substance Agreement on file:   Encounter-Level CSA Scan Date - 04/21/2017:    Scan on 4/28/2017  8:34 AM by Martha Ordaz M: [facility] NARCOTIC AGREEMENT        Last office visit:  Virtual visit 11/5/2020 with PCP Dr BRENDA Fuentes

## 2021-06-14 NOTE — PROGRESS NOTES
Lizzy Tom is a 40 y.o. female who is being evaluated via a billable telephone visit.      What phone number would you like to be contacted at? [phone]  How would you like to obtain your AVS? AVS Preference: Renetta.  Assessment & Plan     Lizzy was seen today for med review.    Diagnoses and all orders for this visit:    Chronic pain syndrome    Long term (current) use of opiate analgesic    Calculus of gallbladder without cholecystitis without obstruction    Financial difficulties  -     Ambulatory referral to Care Management (Primary Care)    Does not have health insurance  -     Ambulatory referral to Care Management (Primary Care)      We had a detailed discussion today again regarding her pain medication and plan going forward for managing her pain meds.  Discussed that it is extremely important that she establish care with a local pain management specialist.  She intends to do so as soon as she gets her insurance.  We will refer her to care management specialist to see if there are any additional resources or assistance that can be provided to patient in order to obtain her new insurance in a more timely manner.  Discussed importance of her seeing a general surgeon as soon as possible to discuss getting her gallbladder removed.  In the meantime she is to be extremely careful about the foods that she eats to help reduce her risk of having another gallbladder attack.  She is now back to taking her usual pain medication regimen and this does help reduce her pain and improve her function and she does not experience any adverse side effects.  We discussed the importance of using only 1 pharmacy to fill her pain medications and that going forward will not allow her to switch back and forth between pharmacies as this causes unnecessary confusion and raises red flags.  She is agreeable to this plan.  [id number]}     Tobacco Cessation:   reports that she has been smoking cigarettes. She has a 8.50 pack-year  smoking history. She has never used smokeless tobacco.      No follow-ups on file.    Oma Fuentes MD  [facility]     Lizzy Tom is 40 y.o. and presents to clinic today for the following health issues   HPI   She is evaluated today via telephone encounter for medication check and follow-up visit.  Reviewed medications, allergies and updated the chart as needed.  Last virtual visit was in early November 2020.  She continues to live in [place].  She continues to plan to establish care with a pain management specialist in [place] but she is still waiting to receive her [place] care insurance.  Until then she is not able to afford the cost of office visits.  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  She has cholelithiasis as well and recently had a flareup of cholelithiasis around [place] time.  This was due to holiday baking and eating foods that she does not normally eat on a regular basis.  She did contact the clinic during this time due to her increased pain and she was advised several times to be seen at an urgent care or emergency department for evaluation.  However she did not want to do so due to concern about the cost of an emergency department visit.  She was given permission to take an extra tablet of her regular release oxycodone per day if she needed to for management of her increased pain due to her gallbladder attack but she should do this very sparingly.  She subsequently improved and felt much better.  Then she went on a trip out of town to [place] over New Year's weekend and the first part of January.  Due to traveling out of town and using a little bit more of her pain medication to manage increased pain due to her gallbladder attack, she required an early refill of her pain medications.  [facility], did  not want to provide this early refill.  She then elected to have her prescription transferred over to [facility].  She plans to continue to use [facility] going forward but unfortunately when she contacted the clinic recently for her regular monthly refill, she forgot to switch the pharmacy from [facility] and her prescription was inadvertently sent to [facility].  [facility] contacted the clinic with concern about patient's refill request as well as the fact that she was switching pharmacies.    In the past patient has preferred to use [facility] because of their drive up availability as she has 2 young children and it is difficult for her to get them out of the car and bring them into the pharmacy to obtain her refill during the cold winter months.  But going forward she does plan to use [facility] as her only pharmacy for filling her pain medication.    She reports that her current pain medication regimen does continue to provide her benefit.  It does reduce her pain and improve her function.  She does not experience any significant adverse effects such as constipation, drowsiness or confusion.  She continues to plan establish care with a local pain management specialist as soon as her new insurance takes effect.      She has tried to be very careful with her diet to avoid exacerbation of her gallbladder symptoms.  She is planning to see a general surgeon to discuss having her gallbladder removed as soon as she has her [facility] insurance.          Review of Systems   All other systems reviewed and are negative.          Objective         Physical Exam  Exam: She is alert.  She is speaking clearly.  She does not sound short of breath            Phone call duration: 11 minutes

## 2021-06-14 NOTE — TELEPHONE ENCOUNTER
"Patient states she would like her Oxycodone prescription re-sent to the pharmacy in [facility] in [facility].    States it was sent to the [facility] in [place] and the [facility] wasn't comfortable giving her an early refill.     Can someone from the clinic please follow up with patient regarding this request?  Thanks    Margy Bynum RN/M [facility]          Reason for Disposition    Caller has NON-URGENT medication question about med that PCP prescribed and triager unable to answer question    Additional Information    Negative: Drug overdose and nurse unable to answer question    Negative: Caller requesting information not related to medicine    Negative: Caller requesting a prescription for Strep throat and has a positive culture result    Negative: Rash while taking a medication or within 3 days of stopping it    Negative: Immunization reaction suspected    Negative: [1] Asthma and [2] having symptoms of asthma (cough, wheezing, etc)    Negative: MORE THAN A DOUBLE DOSE of a prescription or over-the-counter (OTC) drug    Negative: [1] DOUBLE DOSE (an extra dose or lesser amount) of over-the-counter (OTC) drug AND [2] any symptoms (e.g., dizziness, nausea, pain, sleepiness)    Negative: [1] DOUBLE DOSE (an extra dose or lesser amount) of prescription drug AND [2] any symptoms (e.g., dizziness, nausea, pain, sleepiness)    Negative: Took another person's prescription drug    Negative: [1] DOUBLE DOSE (an extra dose or lesser amount) of prescription drug AND [2] NO symptoms (Exception: a double dose of antibiotics)    Negative: Diabetes drug error or overdose (e.g., insulin or extra dose)    Negative: [1] Request for URGENT new prescription or refill of \"essential\" medication (i.e., likelihood of harm to patient if not taken) AND [2] triager unable to fill per unit policy    Negative: [1] Prescription not at pharmacy AND [2] was prescribed today by PCP    Negative: Pharmacy calling with " prescription questions and triager unable to answer question    Negative: Caller has URGENT medication question about med that PCP prescribed and triager unable to answer question    Protocols used: MEDICATION QUESTION CALL-A-AH

## 2021-06-14 NOTE — TELEPHONE ENCOUNTER
Spoke with Padmini at [facility] pharmacy in [facility]   She states she is uncomfortable filling patients oxycodone 10 mg early.     Patient received an early refill in November and was informed by the pharmacy she would be unable to refill this again until 12/13/2020.    Because she was unable to refill this prescription at [facility] until 12/13/2020 she then had this sent to [facility] instead and picked it up on 12/7/2020.    [facility] in [facility] will not refill this medication for her as she is using multiple pharmacies and receives early refills often.       The pharmacist states she should have a controlled substance agreement indicating she can use one pharmacy only for controlled substances.

## 2021-06-14 NOTE — TELEPHONE ENCOUNTER
I am not comfortable in refilling this patient's medication early.  I will need to defer to Dr. Simon if early refill is necessary.  It appears Dr. Fuentes has been monitoring this message.  VJ

## 2021-06-14 NOTE — TELEPHONE ENCOUNTER
Controlled Substance Refill Request  Medication Name:   Requested Prescriptions     Pending Prescriptions Disp Refills     oxyCODONE (OXYCONTIN) 10 mg 12 hr tablet 30 tablet 0     Sig: Take 1 tablet by mouth at bedtime for chronic back and jaw pain     Date Last Fill: 12/2/20  Requested Pharmacy: [facility]  Submit electronically to pharmacy  Controlled Substance Agreement on file:   Encounter-Level CSA Scan Date - 04/21/2017:    Scan on 4/28/2017  8:34 AM by Martha Ordaz: [facility] NARCOTIC AGREEMENT        Last office visit:  11/5/20

## 2021-06-14 NOTE — TELEPHONE ENCOUNTER
Patient is checking status of refill  oxycodone. She was notified that it might take  3 business days to get filled.

## 2021-06-14 NOTE — TELEPHONE ENCOUNTER
Controlled Substance Refill Request  Medication Name:   Requested Prescriptions     Pending Prescriptions Disp Refills     oxyCODONE (OXYCONTIN) 10 mg 12 hr tablet 30 tablet 0     Sig: Take 1 tablet by mouth at bedtime for chronic back and jaw pain     Date Last Fill: 12/28/20  [facility]  [facility] electronically to pharmacy  Controlled Substance Agreement on file:   Encounter-Level CSA Scan Date - 04/21/2017:    Scan on 4/28/2017  8:34 AM by Martha Ordaz: [facility] NARCOTIC AGREEMENT        Last office visit:  11/5/20

## 2021-06-14 NOTE — PROGRESS NOTES
Clinic Care Coordination Contact  [facility]/Voicemail       Clinical Data: Care Coordinator Outreach  Outreach attempted x 1.  Left message on patient's voicemail with call back information and requested return call.  Plan: Care Coordinator discuss CC referral, assist with insurance coverage    Care Coordinator will try to reach patient again in 1-2 business days.  2/3/2021    Left Primary CHW contact number [phone]

## 2021-06-14 NOTE — PROGRESS NOTES
Subjective:   Lizzy Tom is a 37 y.o. female who presents for evaluation of pain. See rooming evaluation. Patient was last seen 11/13/17.     CC: Pain.     Major issues:  1. Jaw pain          Patient Active Problem List   Diagnosis     Restless leg     Vitamin D Deficiency     Excessive Bleeding During Period (Menorrhagia)     Muscle pain     Anxiety, generalized     Disorder of jaw     Low back pain     Sciatic Radiculopathy     Joint Pain, Localized In The Knee     Joint Pain, Localized In The Left Shoulder     Insomnia     Snoring (Symptom)     Leukocytosis     Skin sensation disturbance     Sudden Redness Of The Skin (Flushing)     Hematuria     Atypical Chest Pain     Jaw pain     Narcotic drug use       HPI:   Impact of pain treatments:   ADL's: Work: She has not heard about the job she applied for, she is hoping to hear this week. 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 She is intending to look for a ob at the [employer]. 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 Grooming: Able to bath, dress and eat w/o assistance. Household: Patient is able to participate in general chores. She is also doing a bit of    AE's: none  Aberrant behavior: none    Aggravating factors: lifting the baby, clenching teeth, cold  Alleviating factors: medication  Location/Laterality of the pain: jaw and low back goes down the legs  Timing: constant  Quality: sore, legs get worse and numb at night it wakes her.   Severity:8/10    Activities Impaired by Increasing Pain Severity: F= 6  3-Enjoy  4-Work, Enjoy  5-Active, Mood Work Enjoy  6-Sleep, Active, Mood Work Enjoy  7-Walk, Sleep, Active, Mood Work Enjoy  8-Relate, Walk, Sleep, Active, Mood Work Enjoy      Medication:   Last opioid dose was Oxycodone 12/3/17 @ 800 pm.      Current Outpatient Prescriptions:      cyclobenzaprine (FLEXERIL) 10 MG tablet, Take 1 tablet (10 mg total) by mouth 3 (three) times a day as needed for muscle spasms.,  "Disp: 30 tablet, Rfl: 3 - occasional she will get too drowzy            L norgest/e.estradiol-e.estrad (CAMRESE LO) 0.10 mg-20 mcg (84)/10 mcg (7) 3MPk, Take 1 tablet by mouth daily., Disp: 84 tablet, Rfl: 3       loperamide (IMODIUM A-D) 2 mg tablet, Take 1 tablet (2 mg total) by mouth 4 (four) times a day as needed for diarrhea., Disp: 30 tablet, Rfl: 1     naproxen (NAPROSYN) 500 MG tablet, Take 1 capsule by mouth twice daily as needed for pain, Disp: 60 tablet, Rfl: 3       oxyCODONE (ROXICODONE) 10 mg immediate release tablet, Take 1 tablet (10 mg total) by mouth every 6 (six) hours as needed for pain., Disp: 28 tablet, Rfl: 0 - she indicates it was initially difficult the first 2-3 week she feels this is just stating to get better.    Rehabilitation: Very active life. She will use heat and ice for her jaw. She will do some of the exercises from PT when she remembers    Review of Systems   Musculoskeletal- - pain  Neuro- - cognitive changes  HEENT-  + jaw pain  Denies any GI or  concerns  Psych- + taking medication in a fashion other than prescribed    Social  Family History   Problem Relation Age of Onset     Cancer Father      lung     Diabetes Maternal Grandfather      Cancer Maternal Grandfather      Prostate cancer Maternal Grandfather      No Medical Problems Son      History   Smoking Status     Current Some Day Smoker     Packs/day: 0.50     Years: 17.00     Types: Cigarettes     Last attempt to quit: 7/1/2016   Smokeless Tobacco     Never Used     Comment: 2 per day     History   Alcohol Use No     History   Drug Use     Yes     Special: Oxycodone     History   Sexual Activity     Sexual activity: Not Currently     Partners: Male     Birth control/ protection: None     Comment: single       Objective:     Vitals:    12/04/17 1313   BP: 95/59   Pulse: (!) 109   Resp: 16   Weight: 142 lb 6 oz (64.6 kg)   Height: 5' 5\" (1.651 m)   PainSc:   8       Constitutional:  Pleasant and cooperative female who " presents w/ her daughter today.   Psychiatric: Mood and affect are appropriate for the situation, setting and topic of discussion.  Patient does not appear sedated.  Integumentary:  Observed skin WNL  HEENT: EOM's grossly intact.    Chest: Breathing is non-labored.   Neurological:  Alert and oriented in all spheres including: time, place, person and situation.    Diagnostics:   Lab:  Last SWAB on 10/5/17.  Reviewed 10/5/17. Detected methadone (unexpected). Failed to detect oxycodone or metabolites (unexpected)     Imaging:  No new imaging available to review    :  Dated 12/4/2017         Assessment:   Lizzy Tom is a 37 y.o. 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 It is noted she has been see at [facility]. [facility] feb 2016 noted a normal MRI.      The SWAB was unexpected. Pt indicates she found medication she thought was cyclobenzaprine and took the medication. She thinks this is where the methadone came from. It is noted when she had the methadone it was felt not to be effective. I was also reported to was taken to a drop off site. She is not clear as to the justification for the lack of oxycodone. It is noted it was clarified with the lab that there was no oxycodone it was not below reportable levels. Will taper the medication this has been a plan for some time. Will monitor along the way. She is give a week of medication. She had mani challenges with the initial reduction. At this time she feels she has become stable. I will continue the reduction for an additional 2 weeks then move to the next reduction of the overall opioid load. See plan below both scripts sent to the pharmacy of her choice.         *[facility]  Precautions:    UDS/Swab- 10/5/17  Consent/Agreement- 4/21/17  Pharmacy- as documented    Count- n/a  Psychological evaluation DA Dario 4/7/16 7/31/17 MME- 75  11/13/17 MME=60  12/4/17 MME = moving to 52.5  MTM: n/a  Pharmacogenetic testing- n/a       Oxycodone 10mg max of 3.5 per day MME=52.5 a 15 day supply = 52.5 provide 53 for the first 15 days and 52 for the second 15 days      Management of opioid medication is inherently a moderate to high complex medial interaction based on the risk management required at each contact r/t risks and side effects.      Medication hx: oxycodone, cyclobenzaprine, naproxen, methadone (not effective), gabapentin (too sleepy); MS Contin (itching); OxyContin (insurance issues); Lidocaine (not assistive); toradol injections (assistive); ibuprofen, hydrocodone; Tylenol #3  Plan:   Plan/NextSteps:     Follow up: 1 month     Education:   Please note there are 2 Sarah's at the [facility]. Today you saw Sarah Mccrary when communicating any needs please specify the last name. Thank you    Please call Monday-Friday for problems or questions and one of the clinical support staff (CSS) will help to get things figured out. The number is [phone]. Some folks are using [employer] to send an e-mail (GoodThreads message). Please remember some issues require an office visit.     Today we reviewed the plan of care and answered questions.      Health Maintenance: Please continue to see primary care for general medical prevention and illness care.    Rehabilitation: remain active    Records: Reviewed to assist with preparation for the office visit and are reflected throughout the note.    Medication:   Medication prescribed today   Continue the same dose for the next 2 weeks (12/4-12/19). Then you will go down to 3.5 pills per day (12/19-1/3/18 - 53)     Requested Prescriptions     Pending Prescriptions Disp Refills     oxyCODONE (ROXICODONE) 10 mg immediate release tablet fill 12/18 for  use from 12/19-1/3/18  [place] 53 tablet 0     Sig: Take 1 tablet (10 mg total) by mouth every 6 (six) hours as needed for pain (max of 3.5 per day).     Signed Prescriptions Disp Refills     oxyCODONE (ROXICODONE) 10 mg immediate release tablet 12/4-12/19 [place] 60 tablet 0     Sig: Take 1 tablet (10 mg total) by mouth every 6 (six) hours as needed for pain.       Check with your pharmacy that the prescriptions are on your profile. Call the pharmacy a week before you want to fill the prescription note the date the script was written may be assistive to the pharmacist. Access to opioids is decreasing as part of federal mandates due to concerns about the opioid epidemic. The pharmacy may need to order the medication for you. Ordering medication typically takes about a week. Please remind the pharmacist the opioid prescription was written on 12/4/2017.        SAFETY REMINDERS  No alcohol while taking controlled substances. Alcohol is not an illegal substance, it is unsafe to use in combination. It is a build up of substances in the body that can be extremely hazardous and may cause respirations to slow to a dangerous rate resulting in hospitalization, brain damage, or death.    Unintentional overdose and death.    People are not always in perfect health. Sometimes the body is weak or compromised because of an illness like the flu, pneumonia, low bood etc. When the body struggles, even though a person is taking their medication as prescribed, the medication may be too much for the body to handle. Combinations of medication can also put a person at high risk. In one study it was noted that 80% of unintentional overdoses occurred in people who were taking a combination of opioids and benzodiazepines.    A big concern would be if someone else got your medication. Your medication is not prescribed to anyone other than you. Your medication could cause harm or death to someone else.    Naloxone is a rescue medication. A  person may need the rescue medication if experiencing an unexpected overdose. The medication is meant to give a person a break by lifting off one burden, the opioid narcotic medication. Medical care is required because the a persons body is compromised and needs further testing and assistance.    Symptoms of overdose include:   !breathing slow and shallow, erratic or not at all  !pinpoint pupils, hallucinations  !confusion  !muscle jerks, slack muscles   !extreme sleepiness or loss of alertness   !awake but not able to talk   !face pale or clammy, vomiting, for lighter skinned people, the skin tone turns bluish purple, for darker skinned people, it turns grayish or ashen   If in a situation where overdose is a concern engage the emergency response system (dial 911).    Do not sell, loan, borrow or share your opioid medication with anyone. Deaths have occurred as a result of this practice. It is illegal and patients are being prosecuted.     Prevent unexpected access/loss of medication: Keep medication locked. Only carry what you need with you.      Patient Arrived @ 1307 for a 1320 appointment.     TT:1323 - 1340  CT: over half spent in education and counseling as outlined in the plan.    Fauzia CASILLAS FNP-BC  [address] [facility] [address]   [phone]  [phone]

## 2021-06-14 NOTE — TELEPHONE ENCOUNTER
Controlled Substance Refill Request  Medication Name:   Requested Prescriptions     Pending Prescriptions Disp Refills     oxyCODONE (ROXICODONE) 10 mg immediate release tablet 150 tablet 0     Sig: Take 1 tablet by mouth every 4 hours as needed for chronic pain. Max of 5 tablets per day.     Date Last Fill: 12/31/2020  [facility]  Submit electronically to pharmacy  Controlled Substance Agreement on file:   Encounter-Level CSA Scan Date - 04/21/2017:    Scan on 4/28/2017  8:34 AM by Martha Ordaz: [facility] NARCOTIC AGREEMENT        Last office visit:  11/5/2020

## 2021-06-14 NOTE — TELEPHONE ENCOUNTER
Guille, pharmacist, calling from [facility] in [place], [place] regarding patients OxyContin.     He reports patient has received multiple early refills on narcotic pain medication and he is uncomfortable dispensing this to the patient early. Guille reports concern over the many early refills on narcotic pain medication this patient receives.       Guille states Dr. Fuentes will probably send this to a different pharmacy so the patient can pick this up early.    Notified Guille it appears this was already sent to [facility] on 1/25/2021. He is going to cancel out the prescription they have on file.

## 2021-06-14 NOTE — PROGRESS NOTES
Assessment/Plan:     1. Chronic pain     2. Mouth pain  Ambulatory referral to Dentistry   3. Anxiety     4. Recurrent major depression         Diagnoses and all orders for this visit:    Chronic pain    Mouth pain  -     Ambulatory referral to Dentistry    Anxiety    Recurrent major depression    Other orders  -     ondansetron (ZOFRAN ODT) 4 MG disintegrating tablet; Take 1 tablet (4 mg total) by mouth every 8 (eight) hours as needed for nausea.  Dispense: 30 tablet; Refill: 1  -     loperamide (IMODIUM A-D) 2 mg tablet; Take 1 tablet (2 mg total) by mouth 4 (four) times a day as needed for diarrhea.  Dispense: 30 tablet; Refill: 1  -     hydrOXYzine (ATARAX) 50 MG tablet; Take 1-2 tablets every 6 hours as needed for anxiety or sleep  Dispense: 60 tablet; Refill: 3  -     diclofenac sodium (VOLTAREN) 1 % Gel; Apply 4 g of gel topically to area of pain 4 times daily as needed  Dispense: 100 g; Refill: 3  -     cyclobenzaprine (FLEXERIL) 10 MG tablet; Take 1 tablet (10 mg total) by mouth 3 (three) times a day as needed for muscle spasms.  Dispense: 30 tablet; Refill: 3  -     DULoxetine (CYMBALTA) 30 MG capsule; Take 1 capsule by mouth daily for 1 week and then increase to 2 capsules daily.  Dispense: 60 capsule; Refill: 2  -     naproxen (NAPROSYN) 500 MG tablet; Take 1 capsule by mouth twice daily as needed for pain  Dispense: 60 tablet; Refill: 3    Discussed with patient that I am not going to be able to prescribe her any narcotic pain medications at this time.  She will need to establish care with a new pain clinic in [place] or she will need to continue to follow-up with [facility] pain clinic and has appointment scheduled in December of this year.  I discussed that I can try to help her manage her pain with nonnarcotic medications.  We provided a refill of naproxen and cyclobenzaprine.  We can help manage withdrawal symptoms and I provided a prescription for ondansetron to use for nausea and Imodium to  use for diarrhea.  We can try some topical diclofenac gel and prescription was sent to her pharmacy.  She has not received relief with topical lidocaine jelly in the past.  Discussed that I would recommend starting an antidepressant/antianxiety medication and Cymbalta might be a good choice because it is good for depression, anxiety and can help with chronic pain as well.  She had tried this in the past.  It has been at least a couple of years since she has taken this medication.  She does not recall the reason why it was discontinued but is willing to try it again.  Prescription was sent to pharmacy.  She will start 30 mg daily for a week and then increase to 2 capsules daily.  Counseled on use of medication and side effects.  Recommend follow-up in 1 month.  Provided prescription for hydroxyzine to take every 4-6 hours as needed for anxiety and sleep.  Referral placed for her to see an oral surgeon regarding the screw that is poking out through her gums.           Subjective:      Lizzy Tom is a 37 y.o. female who comes in today to discuss pain management.  She is followed by the [facility] pain clinic.  Recently had a drug screen that was unexpectedly positive for methadone.  Because of this she was not going to be prescribed any further narcotics until she was seen again in the pain clinic to discuss this in more detail.  She is not able to get an appointment until December 4.  She was offered withdrawal medications but declined those.  Has been given these medications in the past when she was taken off of her narcotic pain medications for not following dosing instructions And did not find them helpful for withdrawal symptoms.  She is very distressed.  She recently moved to [place] and is scheduled to be starting a new job on Monday.  Feels that she is not going to be able to work because she will be in so much pain and will be sick because of her withdrawal symptoms.  Is afraid she is going to lose  this job and then because she does not have any income she will lose the house that they recently purchased.  She states that she did not intentionally take any methadone.  She had been prescribed this in the past when she was pregnant and she thinks that she may have found an old methadone tablet in the couch cushions while she was moving and because she was having increased back pain she decided to take it thinking that it was cyclobenzaprine.  States that she took her last dose of narcotic pain medications yesterday.  She is having a lot of mouth and back pain at this time.  She feels achy because she is off of the pain medications and states that everything hurts.  She is having nausea and she is having diarrhea.  Because of her pain she does not feel that she is going to be able to care for her infant daughter.  She is also feeling a screw from her previous oral surgery starting to poke through her gums and that is giving her a lot of discomfort as well.  She is very anxious and worried.  Also feeling distress.  We reviewed medications.  She has no other concerns today.    Current Outpatient Prescriptions   Medication Sig Dispense Refill     levonorgestrel-ethinyl estradiol (NORDETTE) 0.15-0.03 mg per tablet Take 1 tablet by mouth daily. 84 tablet 3     cyclobenzaprine (FLEXERIL) 10 MG tablet Take 1 tablet (10 mg total) by mouth 3 (three) times a day as needed for muscle spasms. 30 tablet 3     diclofenac sodium (VOLTAREN) 1 % Gel Apply 4 g of gel topically to area of pain 4 times daily as needed 100 g 3     DULoxetine (CYMBALTA) 30 MG capsule Take 1 capsule by mouth daily for 1 week and then increase to 2 capsules daily. 60 capsule 2     hydrOXYzine (ATARAX) 50 MG tablet Take 1-2 tablets every 6 hours as needed for anxiety or sleep 60 tablet 3     L norgest/e.estradiol-e.estrad (CAMRESE LO) 0.10 mg-20 mcg (84)/10 mcg (7) 3MPk Take 1 tablet by mouth daily. 84 tablet 3     loperamide (IMODIUM A-D) 2 mg tablet  Take 1 tablet (2 mg total) by mouth 4 (four) times a day as needed for diarrhea. 30 tablet 1     naproxen (NAPROSYN) 500 MG tablet Take 1 capsule by mouth twice daily as needed for pain 60 tablet 3     ondansetron (ZOFRAN ODT) 4 MG disintegrating tablet Take 1 tablet (4 mg total) by mouth every 8 (eight) hours as needed for nausea. 30 tablet 1     No current facility-administered medications for this visit.        Past Medical History, Family History, and Social History reviewed.  No past medical history on file.  Past Surgical History:   Procedure Laterality Date     BREAST BIOPSY       MANDIBLE SURGERY       RECONSTRUCTION MANDIBLE / MAXILLA  2015     Latex and Morphine  Family History   Problem Relation Age of Onset     Cancer Father      lung     Diabetes Maternal Grandfather      Cancer Maternal Grandfather      Prostate cancer Maternal Grandfather      No Medical Problems Son      Social History     Social History     Marital status: Single     Spouse name: N/A     Number of children: N/A     Years of education: N/A     Occupational History     Not on file.     Social History Main Topics     Smoking status: Current Some Day Smoker     Packs/day: 0.50     Years: 17.00     Types: Cigarettes     Last attempt to quit: 7/1/2016     Smokeless tobacco: Never Used      Comment: 2 per day     Alcohol use No     Drug use: Yes     Special: Oxycodone     Sexual activity: Not Currently     Partners: Male     Birth control/ protection: None      Comment: single     Other Topics Concern     Not on file     Social History Narrative         Review of systems is as stated in HPI, and the remainder of the 10 system review is otherwise negative.    Objective:     Vitals:    11/10/17 1344   BP: 146/70   Patient Site: Right Arm   Patient Position: Sitting   Cuff Size: Adult Regular   Pulse: (!) 114   Temp: 99.3  F (37.4  C)   TempSrc: Tympanic   SpO2: 98%   Weight: 138 lb 7 oz (62.8 kg)    Body mass index is 23.04  kg/(m^2).    General appearance: alert, appears stated age, cooperative and moderate distress  Neurologic: Grossly normal  Psych: tearful throughout encounter    Results:  PHQ-9: 14   ARTIS-7: 19    This note has been dictated using voice recognition software. Any grammatical or context distortions are unintentional and inherent to the the software.

## 2021-06-15 NOTE — PROGRESS NOTES
Subjective:   Lizzy Tom is a 37 y.o. female who presents for evaluation of pain. See rooming evaluation. Patient was last seen 1/4/18.     CC: Pain. Review of current status. Patient general opinion of symptoms management and function is poor    Since the last visit pain has not changed  improved  worsened    Major issues:  1. Jaw pain        Patient Active Problem List   Diagnosis     Restless leg     Vitamin D Deficiency     Excessive Bleeding During Period (Menorrhagia)     Muscle pain     Anxiety, generalized     Disorder of jaw     Low back pain     Sciatic Radiculopathy     Joint Pain, Localized In The Knee     Joint Pain, Localized In The Left Shoulder     Insomnia     Snoring (Symptom)     Leukocytosis     Skin sensation disturbance     Sudden Redness Of The Skin (Flushing)     Hematuria     Atypical Chest Pain     Jaw pain     Narcotic drug use       HPI:   Impact of pain treatments:   Analgesia: poor  ADL's: Work:She did a trial run of working. She reports she needed to lift 50# boxes so sh did not feel she would be able to manage this ongoing.  Grooming: Able to bath, dress and eat w/o assistance. Household: Patient is able to participate in general chores. Social: Continued vehicle concerns Childcare: Baby is doing well.   AE's: none  Aberrant behavior: none    Location/Laterality of the pain: jaw into the temple  Severity: Today 9/10. Best: 8/10. Worse 10/10.   Quality: locking, screwdriver and pry the joint apart.   Timing: constant  Aggravating factors: chewing, talking, as the day wears on  Alleviating factors: ice/heat    Reports functional improvement with current pain treatments has improved: yes    Activities Impaired by Increasing Pain Severity: F= 6  3-Enjoy  4-Work, Enjoy  5-Active, Mood Work Enjoy  6-Sleep, Active, Mood Work Enjoy  7-Walk, Sleep, Active, Mood Work Enjoy  8-Relate, Walk, Sleep, Active, Mood Work Enjoy      Medication:   Last opioid dose was Oxycodone 2/9/18 @ 0600  "AM.      Current Outpatient Prescriptions:      cyclobenzaprine (FLEXERIL) 10 MG tablet, Take 1 tablet (10 mg total) by mouth 3 (three) times a day as needed for muscle spasms., Disp: 30 tablet, Rfl: 3     L norgest/e.estradiol-e.estrad (CAMRESE LO) 0.10 mg-20 mcg (84)/10 mcg (7) 3MPk, Take 1 tablet by mouth daily., Disp: 84 tablet, Rfl: 3     loperamide (IMODIUM A-D) 2 mg tablet, Take 1 tablet (2 mg total) by mouth 4 (four) times a day as needed for diarrhea., Disp: 30 tablet, Rfl: 1     naproxen (NAPROSYN) 500 MG tablet, Take 1 capsule by mouth twice daily as needed for pain, Disp: 60 tablet, Rfl: 3     oxyCODONE (ROXICODONE) 10 mg immediate release tablet, Take 1 tablet (10 mg total) by mouth 3 (three) times a day as needed for pain., Disp: 21 tablet, Rfl: 0      Rehabilitation: PT ordered - planning to schedule once she has a reliable vehicle    Review of Systems   Constitutional: weigh loss  Musculoskeletal- +pain  HEENT-  + jaw pain  Psych-  - taking medication in a fashion other than prescribed    Social  Family History   Problem Relation Age of Onset     Cancer Father      lung     Diabetes Maternal Grandfather      Cancer Maternal Grandfather      Prostate cancer Maternal Grandfather      No Medical Problems Son      History   Smoking Status     Current Some Day Smoker     Packs/day: 0.50     Years: 17.00     Types: Cigarettes     Last attempt to quit: 7/1/2016   Smokeless Tobacco     Never Used     Comment: 2 per day     History   Alcohol Use No     History   Drug Use     Yes     Special: Oxycodone     History   Sexual Activity     Sexual activity: Not Currently     Partners: Male     Birth control/ protection: None     Comment: single       Objective:     Vitals:    02/09/18 1058   BP: 108/77   Pulse: 92   Resp: 16   Weight: 134 lb 5 oz (60.9 kg)   Height: 5' 5\" (1.651 m)   PainSc:   9       Constitutional:  Pleasant and cooperative female who presents w/ her daughter who is healthy and doing well today. "   Psychiatric: Mood and affect are appropriate for the situation, setting and topic of discussion.  Patient does not appear sedated.  Integumentary:  Observed skin WNL  HEENT: EOM's grossly intact.    Chest: Breathing is non-labored.   Neurological:  Alert and oriented in all spheres including: time, place, person and situation.    Diagnostics:   Lab:  Last SWAB on 10/5/17.  Reviewed 10/5/17. Detected methadone (unexpected). Failed to detect oxycodone or metabolites (unexpected)     Imaging:  No new imaging available to review    :  Dated 2/9/2018    Assessment:   Lizzy Tom is a 37 y.o. 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 It is noted she has been see at [facility]. [facility] feb 2016 noted a normal MRI.     Continue to taper the opioid medication. SWAB was unexpected Pt indicates she found medication she thought was cyclobenzaprine and took the medication. She thinks this is where the methadone came from. It is noted when she had the methadone it was felt not to be effective. I was also reported to was taken to a drop off site. She is not clear as to the justification for the lack of oxycodone.     We discussed botox for the jaw as an option for symptoms management        **Universal Precautions:    UDS/Swab- 10/5/17  Consent/Agreement- 4/21/17  Pharmacy- as documented    Count- n/a  Psychological evaluation DA Dario 4/7/16 2/9/18 MME=37.5  MTM: n/a  Pharmacogenetic testing- n/a    Management of opioid medication is inherently a moderate to high complex medial interaction based on the risk management required at each contact r/t risks and side effects.    Plan:   Plan/NextSteps:     Follow up: 1 month    Education:    Please call Monday-Friday for problems or questions and one of the clinical support staff (CSS) will help to get things figured out. The number is [phone]. Some folks are using [employer] to send an e-mail ([employer] message). Please remember some issues require an office visit.     Today we reviewed the plan of care and answered questions.      Health Maintenance: Please continue to see primary care for general medical prevention and illness care.    Rehabilitation:I understand you will start PT once you have reliable transportation.    Interventional: We discussed considering botox of the jaw pain. I will look into this further for you    Records: Reviewed to assist with preparation for the office visit and are reflected throughout the note.    Medication:   Medication prescribed today       oxyCODONE (ROXICODONE) 5 MG immediate release tablet 2/9-2/24 (reduced) 75 tablet 0     Sig: Take 1 tablet (5 mg total) by mouth every 4 (four) hours as needed for pain (max of 5 per day and 75/15 days).       REFILL INSTRUCTIONS:  Please contact the clinic refill line 7 days before your refill is due. Speak clearly; note cell phones cut in-and-out and poor quality speech and reception issues will influence our ability to hear you and be efficient with your prescription.     Call [phone] leave:   Your name (first and last w/ spelling)   Date of birth  Name of all the medication(s) being requested  Dose of the medication(s)   How you are taking the medication (eg. twice per day etc).     Contact your pharmacy 3 (three) days after leaving your message to see if your prescription has been received. Please request the pharmacy check your profile to be certain about any concerns with a script failing to be received. Note: Katy updates have been inconsistent.  If the script has not been received there may have been a problem with the communication please reach back out to the clinic.       SAFETY REMINDERS  No alcohol while  taking controlled substances. Alcohol is not an illegal substance, it is unsafe to use in combination. It is a build up of substances in the body that can be extremely hazardous and may cause respirations to slow to a dangerous rate resulting in hospitalization, brain damage, or death.    Unintentional overdose and death.    People are not always in perfect health. Sometimes the body is weak or compromised because of an illness like the flu, pneumonia, low bood etc. When the body struggles, even though a person is taking their medication as prescribed, the medication may be too much for the body to handle. Combinations of medication can also put a person at high risk. In one study it was noted that 80% of unintentional overdoses occurred in people who were taking a combination of opioids and benzodiazepines.    A big concern would be if someone else got your medication. Your medication is not prescribed to anyone other than you. Your medication could cause harm or death to someone else.    Naloxone is a rescue medication. A person may need the rescue medication if experiencing an unexpected overdose. The medication is meant to give a person a break by lifting off one burden, the opioid narcotic medication. Medical care is required because the a persons body is compromised and needs further testing and assistance.    Symptoms of overdose include:   !breathing slow and shallow, erratic or not at all  !pinpoint pupils, hallucinations  !confusion  !muscle jerks, slack muscles   !extreme sleepiness or loss of alertness   !awake but not able to talk   !face pale or clammy, vomiting, for lighter skinned people, the skin tone turns bluish purple, for darker skinned people, it turns grayish or ashen   If in a situation where overdose is a concern engage the emergency response system (dial 911).    Do not sell, loan, borrow or share your opioid medication with anyone. Deaths have occurred as a result of this practice. It is  illegal and patients are being prosecuted.     Prevent unexpected access/loss of medication: Keep medication locked. Only carry what you need with you.      Patient Arrived @ 1031 for a 1040 appointment.     TT: [phone] [phone]  CT: over half spent in education and counseling as outlined in the plan.    Fauzia CASILLAS FNP-BC  [address] [facility] [address]   [phone]  [phone]

## 2021-06-15 NOTE — TELEPHONE ENCOUNTER
Controlled Substance Refill Request  Medication Name:   Requested Prescriptions     Pending Prescriptions Disp Refills     oxyCODONE (ROXICODONE) 10 mg immediate release tablet 150 tablet 0     Sig: Take 1 tablet by mouth every 4 hours as needed for chronic pain. Max of 5 tablets per day.     Date Last Fill: 2/28/21  Requested Pharmacy: [facility]  Submit electronically to pharmacy  Controlled Substance Agreement on file:   Encounter-Level CSA Scan Date - 04/21/2017:    Scan on 4/28/2017  8:34 AM by Martha Ordaz: [facility] NARCOTIC AGREEMENT        Last office visit:  1/29/21         Patient Seen in: [facility]    History   Patient presents with:  Bite (integumentary)    Stated Complaint:     HPI    31-year-old female presents with chief complaint of insect bite. Onset this morning.   Patient complains of Cancer Paternal Grandmother      unknown type       Smoking status: Never Smoker                                                              Smokeless tobacco: Never Used                      Alcohol use:  No                Review of Systems    Positive fo examined. Lymphatic: No gross lymphadenopathy noted. Musculoskeletal: Right upper extremity-Right upper extremity without acute bony deformity. A pruritic punctate skin opening with 1 cm of surrounding erythema is present at the posterior right forearm. Normal, Disp-21 capsule, R-0    DiphenhydrAMINE HCl (BENADRYL) 25 MG Oral Cap  Take 1-2 capsules (25-50 mg total) by mouth every 6 (six) hours as needed for Itching., Normal, Disp-40 capsule, R-0

## 2021-06-15 NOTE — TELEPHONE ENCOUNTER
Controlled Substance Refill Request  Medication Name:   Requested Prescriptions     Pending Prescriptions Disp Refills     oxyCODONE (OXYCONTIN) 10 mg 12 hr tablet 30 tablet 0     Sig: Take 1 tablet by mouth at bedtime for chronic back and jaw pain     Date Last Fill: 1/25/21  Requested [facility]acy: [facility]  Submit electronically to pharmacy  Controlled Substance Agreement on file:   Encounter-Level CSA Scan Date - 04/21/2017:    Scan on 4/28/2017  8:34 AM by Martha Ordaz: [facility] NARCOTIC AGREEMENT        Last office visit:  1/29/21

## 2021-06-15 NOTE — TELEPHONE ENCOUNTER
Controlled Substance Refill Request  Medication Name:   Requested Prescriptions     Pending Prescriptions Disp Refills     oxyCODONE (ROXICODONE) 10 mg immediate release tablet 150 tablet 0     Sig: Take 1 tablet by mouth every 4 hours as needed for chronic pain. Max of 5 tablets per day.     Date Last Fill: 1/29/21  Requested [facility]acy: [facility]  Submit electronically to pharmacy  Controlled Substance Agreement on file:   Encounter-Level CSA Scan Date - 04/21/2017:    Scan on 4/28/2017  8:34 AM by Martha Ordaz: [facility] NARCOTIC AGREEMENT        Last office visit:  1/29/21         Silva Haney RN, BSN Nurse Triage Advisor 6:01 PM 2/23/2021

## 2021-06-15 NOTE — PROGRESS NOTES
Clinic Care Coordination Contact  [facility]/Voicemail       Clinical Data: Care Coordinator Outreach  Outreach attempted x 3.  Left message on patient's voicemail with call back information and requested return call.  Plan: Care Coordinator will send care coordination introduction letter with care coordinator contact information and explanation of care coordination services via [employer]. Care Coordinator will do no further outreaches at this time.

## 2021-06-15 NOTE — PROGRESS NOTES
Clinic Care Coordination Contact  [facility]/Voicemail       Clinical Data: Care Coordinator Outreach  Outreach attempted x 2.  Left message on patient's voicemail with call back information and requested return call.  Plan:  Care Coordinator will try to reach patient again in 1-2 business days.

## 2021-06-15 NOTE — PROGRESS NOTES
Subjective:   Lizzy Tom is a 37 y.o. female who presents for evaluation of pain. See rooming evaluation. Patient was last seen 12/4/17.     CC: Pain.     Major issues:  1. Jaw pain          Patient Active Problem List   Diagnosis     Restless leg     Vitamin D Deficiency     Excessive Bleeding During Period (Menorrhagia)     Muscle pain     Anxiety, generalized     Disorder of jaw     Low back pain     Sciatic Radiculopathy     Joint Pain, Localized In The Knee     Joint Pain, Localized In The Left Shoulder     Insomnia     Snoring (Symptom)     Leukocytosis     Skin sensation disturbance     Sudden Redness Of The Skin (Flushing)     Hematuria     Atypical Chest Pain     Jaw pain     Narcotic drug use           HPI:   Impact of pain treatments:   Analgesia:  She has been feeling worse as we have lowered the opioid medication  ADL's: Work: She has been looking into working. Childcare: She is very attentive to her daughter who is very engaged and growing well.       Aggravating factors: winter she has tighter muscles in her jaw from shivering and clenching, carrying the car seat as the baby gets bgger, chewing   Alleviating factors: medication  Location/Laterality of the pain: jaw pain  Timing:constant  Quality: throbbing, pressure, tightness, feeling out of socket  Severity:9/10    Activities Impaired by Increasing Pain Severity: F= 6  3-Enjoy  4-Work, Enjoy  5-Active, Mood Work Enjoy  6-Sleep, Active, Mood Work Enjoy  7-Walk, Sleep, Active, Mood Work Enjoy  8-Relate, Walk, Sleep, Active, Mood Work Enjoy      Medication:   Last opioid dose was Oxycodone 1/3/18 @ 500 pm.      Current Outpatient Prescriptions:      cyclobenzaprine (FLEXERIL) 10 MG tablet, Take 1 tablet (10 mg total) by mouth 3 (three) times a day as needed for muscle spasms., Disp: 30 tablet, Rfl: 3 - occasionally before bed too fatigued to use during the day     L norgest/e.estradiol-e.estrad (CAMRESE LO) 0.10 mg-20 mcg (84)/10 mcg (7) 3MPk,  "Take 1 tablet by mouth daily., Disp: 84 tablet, Rfl: 3     loperamide (IMODIUM A-D) 2 mg tablet, Take 1 tablet (2 mg total) by mouth 4 (four) times a day as needed for diarrhea., Disp: 30 tablet, Rfl: 1     naproxen (NAPROSYN) 500 MG tablet, Take 1 capsule by mouth twice daily as needed for pain, Disp: 60 tablet, Rfl: 3     oxyCODONE (ROXICODONE) 10 mg immediate release tablet, Take 1 tablet (10 mg total) by mouth every 6 (six) hours as needed for pain (max of 3.5 per day)., Disp: 2 tablet, Rfl: 0 - the resection has been a bit difficult she is struggling with the jaw issues    Additional Problems:  Orthodontist.: She is expecting to a retainer and removal of the braces. She indicates she needs to come up with the financials to complete the work.       Review of Systems   Musculoskeletal- + pain, + jaw tightness  Neuro- - cognitive changes  HEENT-  + braces    Social  Family History   Problem Relation Age of Onset     Cancer Father      lung     Diabetes Maternal Grandfather      Cancer Maternal Grandfather      Prostate cancer Maternal Grandfather      No Medical Problems Son      History   Smoking Status     Current Some Day Smoker     Packs/day: 0.50     Years: 17.00     Types: Cigarettes     Last attempt to quit: 7/1/2016   Smokeless Tobacco     Never Used     Comment: 2 per day     History   Alcohol Use No     History   Drug Use     Yes     Special: Oxycodone     History   Sexual Activity     Sexual activity: Not Currently     Partners: Male     Birth control/ protection: None     Comment: single       Objective:     Vitals:    01/04/18 1026   BP: 115/66   Pulse: 82   Resp: 16   Weight: 134 lb 5 oz (60.9 kg)   Height: 5' 5\" (1.651 m)   PainSc:   9       Constitutional:  Pleasant and cooperative female who presents w/ her daughter today. Her son is in the waiting room as he is able to help her when the weather is bad with carrying the baby  Psychiatric: Mood and affect are appropriate for the situation, setting " and topic of discussion.  Patient does not appear sedated.  Integumentary:  Observed skin WNL  HEENT: EOM's grossly intact.    Chest: Breathing is non-labored.   Neurological:  Alert and oriented in all spheres including: time, place, person and situation.    Diagnostics:   Lab:  Last SWAB on 10/5/17.  Reviewed 10/5/17. Detected methadone (unexpected). Failed to detect oxycodone or metabolites (unexpected)     Imaging:  No new imaging available to review    :  Dated 1/4/2018      Assessment:   Lizzy Tom is a 37 y.o. 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 It is noted she has been see at [facility]. [facility] that was being managed by [facility] in feb 2016 noted a normal MRI.    Continue to taper the opioid medication. SWAB was unexpected Pt indicates she found medication she thought was cyclobenzaprine and took the medication. She thinks this is where the methadone came from. It is noted when she had the methadone it was felt not to be effective. I was also reported to was taken to a drop off site. She is not clear as to the justification for the lack of oxycodone.    I have offered she may consider increased use of the cyclobenzaprine noting she can 1/2 to 1/4 tab and see if she is able to get some benefit and build a tolerance to some of the side effects to aid with the muscle tightness in her jaw. I also briefly touched on baclofen as an option. I am offering craniosacral therapy as a treatment option to be considered.       **Universal Precautions:    UDS/Swab- 10/5/17  Consent/Agreement- 4/21/17  Pharmacy- as documented    Count- n/a  Psychological evaluation DA Dario 4/7/16 1/4/18 MME=45  MTM: n/a  Pharmacogenetic testing- n/a    The patient has chronic  pain and is being sustained on a ER/LA opioid for pain symptoms severe enough to warrant around the clock care with an opioid medication.    Management of opioid medication is inherently a moderate to high complex medial interaction based on the risk management required at each contact r/t risks and side effects.    Plan:   Plan/NextSteps:     Follow up:1 month    Education:   Please call Monday-Friday for problems or questions and one of the clinical support staff (CSS) will help to get things figured out. The number is [phone]. Some folks are using AMVONET to send an e-mail (AMVONET message). Please remember some issues require an office visit.     Today we reviewed the plan of care and answered questions.      Health Maintenance: Please continue to see primary care for general medical prevention and illness care.    Rehabilitation: We can consider craniosacral therapy to help with the jaw pain if needed. Keep me posted if you would like to approach.      Consultation: I understand you are working with the orthodontist on the braces and the retainer.     Records: Reviewed to assist with preparation for the office visit and are reflected throughout the note.    Medication:   Medication prescribed today     Requested Prescriptions     Signed Prescriptions Disp Refills     oxyCODONE (ROXICODONE) 10 mg immediate release tablet 45 tablet 0     Sig: Take 1 tablet (10 mg total) by mouth 3 (three) times a day as needed for pain.     Continue the reduction to three per day.    We could consider baclofen as a regular medication to help with jaw tightness.     REFILL INSTRUCTIONS:  Please contact the clinic refill line 7 days before your refill is due. Speak clearly; note cell phones cut in-and-out and poor quality speech and reception issues will influence our ability to hear you and be efficient with your prescription.     Call [phone] leave:   Your name (first and last w/ spelling)   Date of birth  Name of all  the medication(s) being requested  Dose of the medication(s)   How you are taking the medication (eg. twice per day etc).     Contact your pharmacy 3 (three) days after leaving your message to see if your prescription has been received. Please request the pharmacy check your profile to be certain about any concerns with a script failing to be received. Note: Katy updates have been inconsistent.  If the script has not been received there may have been a problem with the communication please reach back out to the clinic.       SAFETY REMINDERS  No alcohol while taking controlled substances. Alcohol is not an illegal substance, it is unsafe to use in combination. It is a build up of substances in the body that can be extremely hazardous and may cause respirations to slow to a dangerous rate resulting in hospitalization, brain damage, or death.    Unintentional overdose and death.    People are not always in perfect health. Sometimes the body is weak or compromised because of an illness like the flu, pneumonia, low bood etc. When the body struggles, even though a person is taking their medication as prescribed, the medication may be too much for the body to handle. Combinations of medication can also put a person at high risk. In one study it was noted that 80% of unintentional overdoses occurred in people who were taking a combination of opioids and benzodiazepines.    A big concern would be if someone else got your medication. Your medication is not prescribed to anyone other than you. Your medication could cause harm or death to someone else.    Naloxone is a rescue medication. A person may need the rescue medication if experiencing an unexpected overdose. The medication is meant to give a person a break by lifting off one burden, the opioid narcotic medication. Medical care is required because the a persons body is compromised and needs further testing and assistance.    Symptoms of overdose include:   !breathing  slow and shallow, erratic or not at all  !pinpoint pupils, hallucinations  !confusion  !muscle jerks, slack muscles   !extreme sleepiness or loss of alertness   !awake but not able to talk   !face pale or clammy, vomiting, for lighter skinned people, the skin tone turns bluish purple, for darker skinned people, it turns grayish or ashen   If in a situation where overdose is a concern engage the emergency response system (dial 911).    Do not sell, loan, borrow or share your opioid medication with anyone. Deaths have occurred as a result of this practice. It is illegal and patients are being prosecuted.     Prevent unexpected access/loss of medication: Keep medication locked. Only carry what you need with you.      Patient Arrived @ 0956 for a 1020 appointment.     TT: 8101 - [phone]  CT: over half spent in education and counseling as outlined in the plan.      Fauzia CASILLAS FNP-BC  [address] [facility] [address]   [phone]  [phone]

## 2021-06-16 PROBLEM — G89.4 CHRONIC PAIN SYNDROME: Status: ACTIVE | Noted: 2020-04-23

## 2021-06-16 PROBLEM — Z90.49 S/P LAPAROSCOPIC APPENDECTOMY: Status: ACTIVE | Noted: 2020-03-31

## 2021-06-16 PROBLEM — R10.9 RIGHT FLANK PAIN: Status: ACTIVE | Noted: 2020-08-08

## 2021-06-16 PROBLEM — Z87.442 HISTORY OF KIDNEY STONES: Status: ACTIVE | Noted: 2020-08-05

## 2021-06-16 PROBLEM — Z79.891 LONG TERM (CURRENT) USE OF OPIATE ANALGESIC: Status: ACTIVE | Noted: 2020-08-17

## 2021-06-16 PROBLEM — N23 RENAL COLIC ON LEFT SIDE: Status: ACTIVE | Noted: 2017-02-26

## 2021-06-16 PROBLEM — E83.42 HYPOMAGNESEMIA: Status: ACTIVE | Noted: 2020-08-05

## 2021-06-16 PROBLEM — R79.82 ELEVATED C-REACTIVE PROTEIN (CRP): Status: ACTIVE | Noted: 2020-08-08

## 2021-06-16 PROBLEM — E88.89 KETOSIS (H): Status: ACTIVE | Noted: 2020-08-08

## 2021-06-16 PROBLEM — F11.90 CHRONIC, CONTINUOUS USE OF OPIOIDS: Status: ACTIVE | Noted: 2020-08-07

## 2021-06-16 NOTE — TELEPHONE ENCOUNTER
Refills on her oxycodone.  Patient needs in person visit for medication check before further refills can be granted.  Please call schedule

## 2021-06-16 NOTE — TELEPHONE ENCOUNTER
Controlled Substance Refill Request  Medication Name:   Requested Prescriptions     Pending Prescriptions Disp Refills     oxyCODONE (OXYCONTIN) 10 mg 12 hr tablet 30 tablet 0     Sig: Take 1 tablet by mouth at bedtime for chronic back and jaw pain     Date Last Fill: 3/19/21  Requested Pharmacy: [facility]  Submit electronically to pharmacy  Controlled Substance Agreement on file:   Encounter-Level CSA Scan Date - 04/21/2017:    Scan on 4/28/2017  8:34 AM by Martha Ordaz: [facility] NARCOTIC AGREEMENT        Last office visit:  1/29/21

## 2021-06-16 NOTE — TELEPHONE ENCOUNTER
Telephone Encounter by Reina Mitchell at 8/6/2019  8:39 AM     Author: Reina Mitchell Service: -- Author Type: --    Filed: 8/6/2019  8:40 AM Encounter Date: 8/2/2019 Status: Signed    : Reina Mitchell APPROVED:    Approval start date:08/01/2019  Approval end date:02/01/2020    Pharmacy has been notified of approval and will contact patient when medication is ready for pickup.

## 2021-06-16 NOTE — TELEPHONE ENCOUNTER
Controlled Substance Refill Request  Medication Name:   Requested Prescriptions     Pending Prescriptions Disp Refills     oxyCODONE (OXYCONTIN) 10 mg 12 hr tablet 30 tablet 0     Sig: Take 1 tablet by mouth at bedtime for chronic back and jaw pain     Date Last Fill: 2/21/21  Requested [facility]: [facility]  Submit electronically to pharmacy  Controlled Substance Agreement on file:   Encounter-Level CSA Scan Date - 04/21/2017:    Scan on 4/28/2017  8:34 AM by Martha Ordaz: [facility] NARCOTIC AGREEMENT        Last office visit:  1/29/21

## 2021-06-16 NOTE — TELEPHONE ENCOUNTER
Controlled Substance Refill Request  Medication Name:   Requested Prescriptions     Pending Prescriptions Disp Refills     oxyCODONE (ROXICODONE) 10 mg immediate release tablet 150 tablet 0     Sig: Take 1 tablet by mouth every 4 hours as needed for chronic pain. Max of 5 tablets per day.     Date Last Fill: 3/25/21  Requested Pharmacy: [facility]  Submit electronically to pharmacy  Controlled Substance Agreement on file:   Encounter-Level CSA Scan Date - 04/21/2017:    Scan on 4/28/2017  8:34 AM by Martha Ordaz: [facility] NARCOTIC AGREEMENT        Last office visit:  1/29/21

## 2021-06-16 NOTE — TELEPHONE ENCOUNTER
Controlled Substance Refill Request  Medication Name:   Requested Prescriptions     Pending Prescriptions Disp Refills     oxyCODONE (ROXICODONE) 10 mg immediate release tablet 150 tablet 0     Sig: Take 1 tablet by mouth every 4 hours as needed for chronic pain. Max of 5 tablets per day.     Date Last Fill: 3/25/21  Requested Pharmacy: None selected  Submit electronically to pharmacy  Controlled Substance Agreement on file:   Encounter-Level CSA Scan Date - 04/21/2017:    Scan on 4/28/2017  8:34 AM by Martha Ordaz: [facility] NARCOTIC AGREEMENT        Last office visit:  1/29/21

## 2021-06-17 NOTE — PROGRESS NOTES
Assessment/Plan:     1. Chronic TMJ pain     2. Chronic narcotic dependence     3. Mouth pain     4. Chronic pain     5. Jaw pain     6. Difficulty chewing     7. Injury of finger of right hand, initial encounter         Diagnoses and all orders for this visit:    Chronic TMJ pain    Chronic narcotic dependence    Mouth pain    Chronic pain    Jaw pain    Difficulty chewing    Discussed with patient that I would be willing to provide interim pain medication management.  Discussed that I do not intend to prescribe narcotic pain medications long-term for her and she will need to establish care with a new pain clinic.  She has already started looking for providers and pain clinic options near her home in [place].  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  Also recommended strongly that she start a medication for anxiety and depression.  I suggested Cymbalta because this can also be beneficial for patients who have chronic pain.  She is agreeable to starting Cymbalta.  Prescription for 30 mg capsules sent to pharmacy.  Directed her to take 1 capsule daily for 1 week and then increase to 2 capsules daily.  Counseled on use of medication and side effects.  We will have her follow-up again in 1 month for medication check.  I did give her prescription for oxycodone 10 mg tablets to take 1 tablet every 4 hours as needed for pain with maximum of 5 tablets per day.  This was a regimen that worked best for her.  Counseled her on use of long-term opiate pain medication.  She will follow-up again in 1 month.    Injury of finger of right hand, initial encounter  No signs of infection at this time.  Wound appears to be healing well.  Continue with topical antibiotic ointment.  Will recheck in 1 month.  Follow-up sooner with local clinic if new concerning symptoms develop.    Other  orders  -     DULoxetine (CYMBALTA) 30 MG capsule; Take 1 capsule by mouth daily and then increase to 1 capsule by mouth twice daily  Dispense: 60 capsule; Refill: 1  -     oxyCODONE (ROXICODONE) 10 mg immediate release tablet; Take 1 tablet (10 mg total) by mouth every 4 (four) hours as needed for pain. Max of 5 per day  Dispense: 150 tablet; Refill: 0           Subjective:      Lizzy Tom is a 37 y.o. female who comes in today to discuss chronic pain management.  She previously was a patient at the [facility] pain clinic.  Recently was discharged due to a repairable patient-provider relationship.  Patient has been going to the pain clinic for past couple of years for management of chronic pain medication for treatment of TMJ pain, chronic jaw and mouth pain.  She reports that up until about October of last year she was well maintained on oxycodone 10 mg every 4 hours.  Reviewed past prescriptions.  These indicated that she would take a maximum of 5 tablets a day.  States that this did not completely take away her pain but did bring it down to a level that was manageable and where she could function, eat and do her daily activities.  Around that time she moved to [place].  In the process of moving she found some old medication that she thought were muscle relaxers.  She took them because she was having increased back pain related to the moving.  Had a drug test at the pain clinic which showed presence of methadone.  Ultimately concluded that the tablets she had taken where methadone tablets.  She had been prescribed these in the past.  She therefore was started on a taper of oxycodone and has been receiving a smaller and smaller amount of oxycodone tablets each month.  Most recently seen at the pain clinic in March.  At that time her total quantity of tablets was reduced.  Patient claims that she did not realize the instructions that were given as they were not clearly pointed out to her.  She therefore  became very upset and wrote several emails via my chart to myself with wording that was not very nice about the pain clinic provider that she was working with.  These were reviewed by the pain clinic and ultimately the decision was made to discharge patient from their care.  She was given a rapid opioid taper over the course of the next 3 weeks.  Patient continues to live in [place].  She is working at [employer].  Has been there for about 4 weeks.  Reports the job is going well.  She is anticipating that she will be able to obtain insurance through her work in about 4-6 weeks.  She is not sure what to do as far as establishing care with a new pain clinic at this time as she does not have her new insurance available yet.  She comes in today to discuss her options.  She is not currently on any antidepressant or anxiety medications.  States that currently she does not feel anxious or depressed but past my chart messages would suggest otherwise.  Provider at pain clinic also thinks there is a strong mental health component to her pain but patient has not met with a pain psychologist which has been recommended multiple times.  We reviewed her medications and allergies.  She also reports that she did have an injury to her right fifth finger last week.  She was picking up some chopped wood and did not realize that there was an nail present in the would.  States that the nail punctured through the skin over the top of her right fifth finger.  It was initially swollen, red and very painful.  She contacted me through my chart and I advised her to have this evaluated at an urgent care clinic.  She did not do so.  She has kept it wrapped up with a bandage and has been applying antibiotic ointment.  She reports it is looking much better.  It is still a little bit swollen but not as painful and she is able to bend it.  She did have a tetanus vaccine about 2 years ago when she was pregnant.  She has not had  any fevers or chills.  States she is always tired.  Medications and allergies are reviewed and updated.  Review of systems is assessed and is otherwise negative.  No other concerns today.    Current Outpatient Prescriptions   Medication Sig Dispense Refill     naproxen (NAPROSYN) 500 MG tablet Take 1 capsule by mouth twice daily as needed for pain 60 tablet 3     VINICIUS 0.15-0.03 mg per tablet   0     cyclobenzaprine (FLEXERIL) 10 MG tablet Take 1 tablet (10 mg total) by mouth 3 (three) times a day as needed for muscle spasms. 30 tablet 3     DULoxetine (CYMBALTA) 30 MG capsule Take 1 capsule by mouth daily and then increase to 1 capsule by mouth twice daily 60 capsule 1     oxyCODONE (ROXICODONE) 10 mg immediate release tablet Take 1 tablet (10 mg total) by mouth every 4 (four) hours as needed for pain. Max of 5 per day 150 tablet 0     No current facility-administered medications for this visit.        Past Medical History, Family History, and Social History reviewed.  No past medical history on file.  Past Surgical History:   Procedure Laterality Date     BREAST BIOPSY       MANDIBLE SURGERY       RECONSTRUCTION MANDIBLE / MAXILLA  2015     Latex and Morphine  Family History   Problem Relation Age of Onset     Cancer Father      lung     Diabetes Maternal Grandfather      Cancer Maternal Grandfather      Prostate cancer Maternal Grandfather      No Medical Problems Son      Social History     Social History     Marital status: Single     Spouse name: N/A     Number of children: N/A     Years of education: N/A     Occupational History     Not on file.     Social History Main Topics     Smoking status: Current Some Day Smoker     Packs/day: 0.50     Years: 17.00     Types: Cigarettes     Last attempt to quit: 7/1/2016     Smokeless tobacco: Never Used      Comment: 2 per day     Alcohol use No     Drug use: Yes     Special: Oxycodone     Sexual activity: Not Currently     Partners: Male     Birth control/  "protection: None      Comment: single     Other Topics Concern     Not on file     Social History Narrative         Review of systems is as stated in HPI, and the remainder of the 10 system review is otherwise negative.    Objective:     Vitals:    18 0822   BP: 112/70   Patient Site: Left Arm   Patient Position: Sitting   Cuff Size: Adult Regular   Pulse: 83   Temp: 100.1  F (37.8  C)   SpO2: 98%   Weight: 132 lb 1 oz (59.9 kg)   Height: 5' 5\" (1.651 m)    Body mass index is 21.98 kg/(m^2).    General appearance: alert, appears stated age and cooperative  Psych: mood appropriate, affect normal  MSK: There is a puncture type wound present dorsum of right fifth finger.  Some mild associated soft tissue swelling.  She is able to flex and extend the right finger.  No significant erythema.  No purulent drainage.  Mild tenderness with palpation    Results:  ARTIS-7 Screening Results:  Feeling nervous, anxious or on edge: 0  Not being able to stop or control worry: 1  Worrying too much about different things: 0  Trouble relaxin  Being so restless that is is hard to sit still: 0  Becoming easily annnoyed or irritable: 1  Feeling afraid as if something awful might happen: 0  ARTIS-7 Total: 2  How difficult did these problems make it for you to do your work, take care of things at home or get along with other people? : Not difficult at all     Little interest or pleasure in doing things: Several days  Feeling down, depressed, or hopeless: Not at all  Trouble falling or staying asleep, or sleeping too much: Several days  Feeling tired or having little energy: Several days  Poor appetite or overeating: Several days  Feeling bad about yourself - or that you are a failure or have let yourself or your family down: Not at all  Trouble concentrating on things, such as reading the newspaper or watching television: Not at all  Moving or speaking so slowly that other people could have noticed. Or the opposite - being so fidgety " or restless that you have been moving around a lot more than usual: Not at all  Thoughts that you would be better off dead, or of hurting yourself in some way: Not at all  PHQ-9 Total Score: 4  If you checked off any problems, how difficult have these problems made it for you to do your work, take care of things at home, or get along with other people?: Somewhat difficult    This note has been dictated using voice recognition software. Any grammatical or context distortions are unintentional and inherent to the the software.

## 2021-06-17 NOTE — PATIENT INSTRUCTIONS - HE
Patient Instructions by Oma Fuentes MD at 4/17/2019  2:40 PM     Author: Oma Fuentes MD Service: -- Author Type: Physician    Filed: 4/17/2019  3:14 PM Encounter Date: 4/17/2019 Status: Signed    : Oma Fuentes MD (Physician)         Patient Education     Understanding Seborrheic Dermatitis    Seborrheic dermatitis is a common type of rash that causes red, scaly, greasy skin. It occurs on skin that has oil glands, such as the face, scalp, and upper chest. It tends to last a long time, or go away and come back. Seborrheic dermatitis is not spread from person to person.  How to say it  [id number]   What causes seborrheic dermatitis?  The cause is not yet known. It may be partly caused by a type of yeast that grows on skin, along with excess oil production. Experts are still learning more. Its more common in men than women, and it can occur at any age. It happens more often in people with HIV/AIDS, Parkinson disease, alcoholic pancreatitis, hepatitis, or cancer. It can also get worse during times of stress.  Symptoms of seborrheic dermatitis  Symptoms can include skin that is:    Bumpy    Covered with yellow scales or crusts    Cracked    Greasy    Itchy    Leaking fluid    Painful    Red or orange  These symptoms can occur on skin:    Around the nose    Behind the ears    In the beard    In the eyebrows    On the scalp, also known as dandruff    On the upper chest  You may also have acne, inflamed eyelids (blepharitis), or other skin conditions at the same time.  Treatment for seborrheic dermatitis  Treatment can reduce symptoms for a period of time. The types of treatments most often used include:    Antifungal shampoo, body wash, or cream. These contain medicines such as ketoconazole, fluconazole, selenium sulfide, ciclopirox, or tea tree oil.    Corticosteroid cream or ointment. These containmedicines such ashydrocortisone or fluocinolone acetonide.    Calcineurin inhibitorcream or  ointment. These contain medicines such as pimecrolimus or tacrolimus.    Shampoo or cream with other medicines. These contain medicines such as coal tar, salicylic acid, or zinc pyrithione.    Sodium sulfacetemide creams and washes. These may also help.  Wash your skin gently. You can remove scales with oil and gentle rubbing or a brush.  Living with seborrheic dermatitis  Seborrheic dermatitis is an ongoing (chronic) condition. It can go away and then come back. You will likely need to use shampoo, cream, or ointment with medicine once or twice a week. This can help to keep symptoms from coming back or getting worse.  When to call your healthcare provider  Call your healthcare provider right away if you have any of these:    Symptoms that dont get better, or get worse    New symptoms   Date Last Reviewed: 5/1/2016 2000-2017 [employer]. [address] [place] [address], [place] [address]. All rights reserved. This information is not intended as a substitute for professional medical care. Always follow your healthcare professional's instructions.

## 2021-06-17 NOTE — TELEPHONE ENCOUNTER
Telephone Encounter by Reina Mitchell at 9/17/2020  8:54 AM     Author: Reina Mitchell Service: -- Author Type: --    Filed: 9/17/2020  8:55 AM Encounter Date: 9/15/2020 Status: Signed    : Reina Mitchell       Patient's coverage has termed

## 2021-06-17 NOTE — TELEPHONE ENCOUNTER
Refill refused. Pt needs in person appt. Message was sent to scheduling pool last month to schedule appt.

## 2021-06-17 NOTE — PROGRESS NOTES
1.     Assessment & Plan     Lizzy was seen today for f/u pain meds.    Diagnoses and all orders for this visit:    Chronic pain syndrome  -     oxyCODONE (OXYCONTIN) 10 mg 12 hr tablet; Take 1 tablet by mouth at bedtime for chronic back and jaw pain  -Refill provided on OxyContin.  She is not quite due for refill of oxycodone.  Reviewed controlled substance agreement with patient and this is signed and will be scanned into chart.  She is planning to establish care with a new PCP in  and will get referral to a pain clinic in her area at that time.  In the meantime she will continue her current pain medication regimen.  She continues to receive benefit from use of chronic opioid pain medications and does not experience any significant side effects other than some mild constipation.  We feel that the benefits of ongoing use of opioid pain medications outweigh the risks as it improves her quality of life and allows her to function and care for her children.    Controlled substance agreement signed  -     Drugs of Abuse 1,Urine    Chronic, continuous use of opioids  -     naloxone (NARCAN) 4 mg/actuation nasal spray; 1 spray (4 mg dose) into one nostril for opioid reversal. Call 911. May repeat if no response in 3 minutes.  -Discussed that it is important for her to have this medication on hand.  New prescription sent to pharmacy to ensure that she does not have an  prescription at home.    Primary insomnia  -     melatonin 3 mg Tab tablet; Take 1 tablet (3 mg total) by mouth at bedtime as needed.  -Suggested trial of melatonin to help with her sleep troubles.  Prescription for this was sent to pharmacy.  She was counseled on use of medication.                 Tobacco Cessation:   reports that she has been smoking cigarettes. She has a 8.50 pack-year smoking history. She has never used smokeless tobacco.    Return in about 6 months (around 2021) for Recheck meds.    Oma Fuentes MD  [facility]  [facility]   Lizzy Tom is 40 y.o. and presents today for the following health issues   HPI   She is seen today for medication check and follow-up visit.  The last encounter was in January via telephone visit.  She is due for renewal of her controlled substance agreement.  We reviewed her current medications and allergies and updated the chart.  She reports that she has been pretty stable over the last few months.  Has not had any gallbladder attacks or troubles with kidney stones.  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  She does have some trouble with constipation.  Has a bowel movement about once a week.  She is not uncomfortable related to this.  She does not experience any significant adverse side effects with the pain medication and continues to feel that they provide her relief of her pain and allow her to function and have improvement in her quality of life.  She is happy to report that she did get her insurance coverage again and she does have an appointment on June 1 to reestablish care with a PCP in [facility] where she lives.  She is planning to get referrals from this new PCP to see a surgeon to discuss getting a cholecystectomy as well as get a referral to a pain management clinic in her area.  She does need a refill of her OxyContin today.  She reports that she does have trouble sleeping.  Finds that it is hard to settle her mind down at night as she thinks about everything that needs to be done.  Expresses some concern that she thinks she may have ADHD as she finds that she is easily distracted and often will forget what she is doing and move on to another task or project.  She has no other concerns or questions.  She does have prescription for naloxone but this was last prescribed over a year ago.  She has never needed to  "use this.  Review of systems is otherwise negative.          Review of Systems   All other systems reviewed and are negative.          Objective    /65 (Patient Site: Right Arm, Patient Position: Sitting, Cuff Size: Adult Regular)   Temp 98.5  F (36.9  C) (Temporal)   Ht 5' 5.5\" (1.664 m)   Wt 142 lb 5 oz (64.6 kg)   LMP 01/08/2021   Breastfeeding No   BMI 23.32 kg/m    Body mass index is 23.32 kg/m .  Physical Exam   Constitutional: She is oriented to person, place, and time. She appears well-developed and well-nourished.   Cardiovascular: Normal rate, regular rhythm and normal heart sounds.   Pulmonary/Chest: Effort normal and breath sounds normal. No respiratory distress. She has no wheezes.   Neurological: She is alert and oriented to person, place, and time.                   "

## 2021-06-17 NOTE — TELEPHONE ENCOUNTER
Controlled Substance Refill Request  Medication Name:   Requested Prescriptions     Pending Prescriptions Disp Refills     oxyCODONE (ROXICODONE) 10 mg immediate release tablet 150 tablet 0     Sig: Take 1 tablet by mouth every 4 hours as needed for chronic pain. Max of 5 tablets per day.     Date Last Fill: 4/24/21  Requested Pharmacy: [facility]  Submit electronically to pharmacy  Controlled Substance Agreement on file:   Encounter-Level CSA Scan Date - 04/21/2017:    Scan on 4/28/2017  8:34 AM by Martha Ordaz: [facility] NARCOTIC AGREEMENT        Last office visit:  5/14/21

## 2021-06-17 NOTE — TELEPHONE ENCOUNTER
Controlled Substance Refill Request  Medication Name:   Requested Prescriptions     Pending Prescriptions Disp Refills     oxyCODONE (OXYCONTIN) 10 mg 12 hr tablet 30 tablet 0     Sig: Take 1 tablet by mouth at bedtime for chronic back and jaw pain     Date Last Fill: 4/16/2021  Is patient out of medication?: N/A - electronic request  Patient notified refills processed within 3 business days: N/A - electronic request  Requested Pharmacy: [facility] in [place], [place]  Submit electronically to pharmacy  Controlled Substance Agreement on file:   Encounter-Level CSA Scan Date - 04/21/2017:    Scan on 4/28/2017  8:34 AM by Martha Ordaz M: [facility] NARCOTIC AGREEMENT        Last office visit:  Virtual visit 1/29/2021 with PCP Dr RBENDA Fuentes

## 2021-06-18 NOTE — PROGRESS NOTES
Assessment/Plan:     1. Lymphadenopathy of head and neck  HM2(CBC w/o Differential)   2. Tick bite  Lyme Antibody [place]    HM2(CBC w/o Differential)   3. Chronic TMJ pain     4. Jaw pain     5. Recurrent major depression         Diagnoses and all orders for this visit:    Lymphadenopathy of head and neck  -     HM2(CBC w/o Differential)  -Discussed with her that the enlarged lymph node seems to be a reactive lymph node.  We will check a hemogram today.  With recent tick bite we will also check Lyme's antibody cascade.  Advised monitoring for a few more weeks.  If it does not decrease in size over the next month, I would evaluate further with an ultrasound or CT scan.  She is a smoker.  Advised smoking cessation.    Tick bite  -     Lyme Antibody Cascade  -     HM2(CBC w/o Differential)    Chronic TMJ pain  She is doing well with oxycodone 10 mg every 4 hours with a maximum of 5 per day.  States getting back on the regimen that worked well for her has made a big difference and she has been able to have better control of her pain and therefore be more active and have improved quality of life.  Again discussed that I am not planning to continue to prescribe this for her long-term and she will need to establish care with a new provider.  She has a provider in mind and is expecting that her new insurance will be active within the next few weeks and she will make an appointment to establish care at that time.  Printed refill of her prescription today.  She is not due for refill until next week.  She will continue duloxetine at current dose.    Jaw pain    Recurrent major depression  -     DULoxetine (CYMBALTA) 30 MG capsule; Take 2 capsules (60 mg total) by mouth daily.  Dispense: 60 capsule; Refill: 1               Subjective:      Lizzy Tom is a 37 y.o. female comes in today to follow-up on pain medication and depression.  She was seen in clinic about 3 and half weeks ago.  Recently been discharged from  [facility] pain clinic.  I agreed to prescribe interim narcotic pain medication until she could establish care with a new provider to manage her chronic pain.  She lives in [place].  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  She is expecting that her new insurance will be active within the next couple of weeks.  She is then planning to establish care with a provider at a local clinic in her area who does management of chronic pain.  At her last office visit we had started her on Cymbalta 30 mg daily and increase to 60 mg daily after 1 week for chronic pain as well as anxiety and depression.  States that she initially had a little bit of GI side effects but those resolved.  She has noticed some swelling in the lymph nodes in the back of her neck and wonders if this is a side effect.  She has not recently been sick.  She has not had cough or cold symptoms.  Denies sore throat.  No fevers or chills.  No cough.  No rash.  No unexplained weight loss.  States that she did pull a deer tick off herself recently in the shower.  She does not think that it was embedded for very long.  She has not had any symptoms of acute Lyme's disease.  Medications and allergies are reviewed and updated.  She has no other concerns or questions today.    Current Outpatient Prescriptions   Medication Sig Dispense Refill     cyclobenzaprine (FLEXERIL) 10 MG tablet Take 1 tablet (10 mg total) by mouth 3 (three) times a day as needed for muscle spasms. 30 tablet 3     DULoxetine (CYMBALTA) 30 MG capsule Take 2 capsules (60 mg total) by mouth daily. 60 capsule 1     naproxen (NAPROSYN) 500 MG tablet Take 1 capsule by mouth twice daily as needed for pain 60 tablet 3     [START ON 5/29/2018] oxyCODONE (ROXICODONE) 10 mg immediate release tablet Take 1 tablet (10 mg total) by mouth every 4 (four) hours as needed for pain. Max of 5 per day 150 tablet 0     VINICIUS 0.15-0.03 mg per tablet   0     No current facility-administered medications for  this visit.        Past Medical History, Family History, and Social History reviewed.  No past medical history on file.  Past Surgical History:   Procedure Laterality Date     BREAST BIOPSY       MANDIBLE SURGERY       RECONSTRUCTION MANDIBLE / MAXILLA  2015     Latex and Morphine  Family History   Problem Relation Age of Onset     Cancer Father      lung     Diabetes Maternal Grandfather      Cancer Maternal Grandfather      Prostate cancer Maternal Grandfather      No Medical Problems Son      Social History     Social History     Marital status: Single     Spouse name: N/A     Number of children: N/A     Years of education: N/A     Occupational History     Not on file.     Social History Main Topics     Smoking status: Current Some Day Smoker     Packs/day: 0.50     Years: 17.00     Types: Cigarettes     Last attempt to quit: 7/1/2016     Smokeless tobacco: Never Used      Comment: 2 per day     Alcohol use No     Drug use: Yes     Special: Oxycodone     Sexual activity: Not Currently     Partners: Male     Birth control/ protection: None      Comment: single     Other Topics Concern     Not on file     Social History Narrative         Review of systems is as stated in HPI, and the remainder of the 10 system review is otherwise negative.    Objective:     Vitals:    05/25/18 0954   BP: 124/70   Patient Site: Right Arm   Patient Position: Sitting   Cuff Size: Adult Regular   Pulse: 78   Temp: 99.3  F (37.4  C)   TempSrc: Tympanic   SpO2: 97%   Weight: 139 lb 4 oz (63.2 kg)    Body mass index is 23.17 kg/(m^2).    General appearance: alert, appears stated age and cooperative  Head: Normocephalic, without obvious abnormality, atraumatic   Eyes: Negative  Ears: TMs normal bilaterally  Neck: enlarged left posterior cervical lymph node that is mobile,soft and mildy tender to palpation   Mouth: Clear, no pharyngeal erythema  Lungs: clear to auscultation bilaterally  Heart: regular rate and rhythm, S1, S2 normal, no  murmur, click, rub or gallop  Psych: mood appropriate, affect normal           This note has been dictated using voice recognition software. Any grammatical or context distortions are unintentional and inherent to the the software.

## 2021-06-19 NOTE — LETTER
Letter by Oma Fuentes MD at      Author: Oma Fuentes MD Service: -- Author Type: --    Filed:  Encounter Date: 8/9/2019 Status: (Other)         August 9, 2019     Patient: Lizzy Tom   YOB: 1980   Date of Visit: 8/9/2019       To Whom It May Concern:    It is my medical opinion that Lizzy Tom may return to work on Monday August 12, 2019..    If you have any questions or concerns, please don't hesitate to call.    Sincerely,        Electronically signed by Oma Fuentes MD

## 2021-06-19 NOTE — LETTER
Letter by Oma Fuentes MD at      Author: Oma Fuentes MD Service: -- Author Type: --    Filed:  Encounter Date: 8/7/2019 Status: (Other)         August 7, 2019     Patient: Lizzy Tom   YOB: 1980   Date of Visit: 8/7/2019       To Whom It May Concern:    Lzizy Tom is under my care at the [facility]. She has been ill and unable to work. She has undergone testing and result of those tests are still pending.     If you have any questions or concerns, please don't hesitate to call.    Sincerely,        Electronically signed by Oma Fuentes MD

## 2021-06-19 NOTE — LETTER
Letter by Oma Fuentes MD at      Author: Oma Fuentes MD Service: -- Author Type: --    Filed:  Encounter Date: 5/13/2019 Status: (Other)        [facility] MEDICINE/OB  [address] [address]  [phone]         Lizzy Tom  [address] [facility] [address]        05/13/19    Dear Lizzy Tom,     At [facility] we care about your health and well-being. Your primary care provider is committed to ensuring you receive high quality care and has chosen a network of specialists to assist in providing that care. Recently Dr Oma Fuentes referred you to [facility] for pool therapy.       It is important to your overall health to follow through with the recommendation from your provider. Please call [facility] ([phone]) at your earliest convenience for assistance in scheduling an appointment. If you have already scheduled this appointment, please disregard this notice. Thank you for choosing [facility] for your healthcare needs.       Sincerely,       [facility] Specialty Scheduling   Dr Oma Fuentes

## 2021-06-19 NOTE — LETTER
Letter by Oma Fuentes MD at      Author: Oma Fuentes MD Service: -- Author Type: --    Filed:  Encounter Date: 5/15/2019 Status: (Other)        [facility] MEDICINE/OB  [address] [address]  [phone]         Lizzy Tom  [address] [facility] [address] [address]        05/15/19    Dear Lizzy Tom,     At [facility] we care about your health and well-being. Your primary care provider is committed to ensuring you receive high quality care and has chosen a network of specialists to assist in providing that care. Recently Dr. Oma Fuentes referred you to [facility] & [facility] for specialty care. They have made an attempt to connect with you to assist with scheduling, however they have been unable to reach you by phone.       It is important to your overall health to follow through with the recommendation from your provider. Please call [facility] ([phone]) & [facility] ([phone]) at your earliest convenience for assistance in scheduling an appointment.  If you have already scheduled this appointment, please disregard this notice.  Thank you for choosing [facility] for your healthcare needs.       Sincerely,     Dr. Oma Fuentes /   [facility] Specialty Scheduling

## 2021-06-20 NOTE — LETTER
Letter by Oma Fuentes MD at      Author: Oma Fuentes MD Service: -- Author Type: --    Filed:  Encounter Date: 1/23/2020 Status: (Other)         January 24, 2020     Patient: Lizzy Tom   YOB: 1980   Date of Visit: 1/23/2020       To Whom It May Concern:    Lzizy Tom was not seen in clinic but contacted us regarding symptoms of illness.  She had to miss several days of work due to gastroenteritis.  She is starting to feel better and may return to work when she has not had any episodes of vomiting for 24 hours.    If you have any questions or concerns, please don't hesitate to call.    Sincerely,        Electronically signed by Oma Fuentes MD

## 2021-06-21 NOTE — LETTER
Letter by Oma Fuentes MD at      Author: Oma Fuentes MD Service: -- Author Type: --    Filed:  Encounter Date: 5/15/2021 Status: (Other)         Lizzy Tom  [address]  [place] [address]             May 15, 2021         Dear Ms. Tom,    Below are the results from your recent visit:    Resulted Orders   Drugs of Abuse 1,Urine   Result Value Ref Range    Amphetamines Screen Negative Screen Negative    Benzodiazepines Screen Negative Screen Negative    Opiates Screen Negative Screen Negative    Phencyclidine Screen Negative Screen Negative    THC Screen Negative Screen Negative    Barbiturates Screen Negative Screen Negative    Cocaine Metabolite Screen Negative Screen Negative    Oxycodone (!) Screen Negative     Screen Positive (Confirmation available on request)    Creatinine, Urine 26.6 mg/dL    Narrative    Drug                  Screening Threshold    Amphetamines           1000 ng/mL  Benzodiazepine          200 ng/mL  Opiates                 300 ng/mL  Phencyclidine            25 ng/mL  THC Metabolite           50 ng/mL  Barbiturates            200 ng/mL  Cocaine Metabolite      150 ng/mL  Oxycodone               100 ng/mL    Screening results are to be used only for medical purposes.  Unconfirmed screening results must not be used for non-  medical purposes.       Urine drug screen with expected findings.     Please call with questions or contact us using [employer].    Sincerely,        Electronically signed by Oma Fuentes MD

## 2021-06-21 NOTE — LETTER
Letter by Candie Sharma CHW at      Author: Candie Sharma CHW Service: -- Author Type: --    Filed:  Encounter Date: 2/9/2021 Status: (Other)       CARE COORDINATION  [facility]  [address]  [place] [address]    February 9, 2021    Lizzy Tom  [address]  [place] [address]      Dear Lizzy,    I am a clinic community health worker who works with Oma Fuentes MD at the [facility]. I have been trying to reach you recently to introduce Clinic Care Coordination and to see if there was anything I could assist you with.  Below is a description of clinic care coordination and how I can further assist you.      The clinic care coordination team is made up of a registered nurse,  and community health worker who understand the health care system. The goal of clinic care coordination is to help you manage your health and improve access to the health care system in the most efficient manner. The team can assist you in meeting your health care goals by providing education, coordinating services, strengthening the communication among your providers and supporting you with any resource needs.    Please feel free to contact me at [phone] with any questions or concerns. We are focused on providing you with the highest-quality healthcare experience possible and that all starts with you.     Sincerely,     [facility] Community Health Worker

## 2021-06-21 NOTE — LETTER
Letter by Oma Fuentes MD at      Author: Oma Fuentes MD Service: -- Author Type: --    Filed:  Encounter Date: 5/14/2021 Status: (Other)       Opioid / Opioid Plus Controlled Substance Agreement    This is an agreement between you and your provider about the safe and appropriate use of controlled substance/opioids prescribed by your care team. Controlled substances are medicines that can cause physical and mental dependence (abuse).    There are strict laws about having and using these medicines. We here at [facility] are committing to working with you in your efforts to get better. To support you in this work, well help you schedule regular office appointments for medicine refills. If we must cancel or change your appointment for any reason, well make sure you have enough medicine to last until your next appointment.     As a Provider, I will:    Listen carefully to your concerns and treat you with respect.     Recommend a treatment plan that I believe is in your best interest. This plan may involve therapies other than opioid pain medication.     Talk with you often about the possible benefits, and the risk of harm of any medicine that we prescribe for you.     Provide a plan on how to taper (discontinue or go off) using this medicine if the decision is made to stop its use.    As a Patient, I understand that opioid(s):     Are a controlled substance prescribed by my care team to help me function or work and manage my condition(s).     Are strong medicines and can cause serious side effects such as:    Drowsiness, which can seriously affect my driving ability    A lower breathing rate, enough to cause death    Harm to my thinking ability     Depression     Abuse of and addiction to this medicine    Need to be taken exactly as prescribed. Combining opioids with certain medicines or chemicals (such as illegal drugs, sedatives, sleeping pills, and benzodiazepines) can be dangerous or even fatal. If I  stop opioids suddenly, I may have severe withdrawal symptoms.    Do not work for all types of pain nor for all patients. If theyre not helpful, I may be asked to stop them.        The risks, benefits and side effects of these medicine(s) were explained to me. I agree that:  1. I will take part in other treatments as advised by my care team. This may be psychiatry or counseling, physical therapy, behavioral therapy, group treatment or a referral to a specialist.     2. I will keep all my appointments. I understand that this is part of the monitoring of opioids. My care team may require an office visit for EVERY opioid/controlled substance refill. If I miss appointments or dont follow instructions, my care team may stop my medicine.    3. I will take my medicines as prescribed. I will not change the dose or schedule unless my care team tells me to. There will be no refills if I run out early.     4. I may be asked to come to the clinic and complete a urine drug test or complete a pill count at any time. If I dont give a urine sample or participate in a pill count, the care team may stop my medicine.    5. I will only receive prescriptions from this clinic for chronic pain. If I am treated by another provider for acute pain issues, I will tell them that I am taking opioid pain medication for chronic pain and that I have a treatment agreement with this provider. I will inform my [facility] care team within one business day if I am given a prescription for any pain medication by another healthcare provider. My [facility] care team can contact other providers and pharmacists about my use of any medicines.    6. It is up to me to make sure that I dont run out of my medicines on weekends or holidays. If my care team is willing to refill my opioid prescription without a visit, I must request refills only during office hours. Refills may take up to 3 business days to process. I will use one pharmacy to fill all  my opioid and other controlled substance prescriptions. I will notify the clinic about any changes to my insurance or medication availability.    7. I am responsible for my prescriptions. If the medicine/prescription is lost, stolen or destroyed, it will not be replaced. I also agree not to share controlled substance medicines with anyone.    8. I am aware I should not use any illegal or recreational drugs. I agree not to drink alcohol unless my care team says I can.       9. If I enroll in the [place] [facility] Cannabis program, I will tell my care team prior to my next refill.     10. I will tell my care team right away if I become pregnant, have a new medical problem treated outside of my regular clinic, or have a change in my medications.    11. I understand that this medicine can affect my thinking, judgment and reaction time. Alcohol and drugs affect the brain and body, which can affect the safety of my driving. Being under the influence of alcohol or drugs can affect my decision-making, behaviors, personal safety, and the safety of others. Driving while impaired (DWI) can occur if a person is driving, operating, or in physical control of a car, motorcycle, boat, snowmobile, ATV, motorbike, off-road vehicle, or any other motor vehicle ([place] Statute 169A.20). I understand the risk if I choose to drive or operate any vehicle or machinery.    I understand that if I do not follow any of the conditions above, my prescriptions or treatment may be stopped or changed.        Opioids  What You Need to Know    What are opioids?   Opioids are pain medicines that must be prescribed by a doctor. They are also known as narcotics.     Examples are:   1. morphine (MS Contin, Maureen)  2. oxycodone (Oxycontin)  3. oxycodone and acetaminophen (Percocet)  4. hydrocodone and acetaminophen (Vicodin, Norco)   5. fentanyl patch (Duragesic)   6. hydromorphone (Dilaudid)   7. methadone  8. codeine (Tylenol #3)     What do opioids do  well?   Opioids are best for severe short-term pain such as after a surgery or injury. They may work well for cancer pain. They may help some people with long-lasting (chronic) pain.     What do opioids NOT do well?   Opioids never get rid of pain entirely, and they dont work well for most patients with chronic pain. Opioids dont reduce swelling, one of the causes of pain.                              Other ways to manage chronic pain and improve function include:       Treat the health problem that may be causing pain    Anti-inflammation medicines, which reduce swelling and tenderness, such as ibuprofen (Advil, Motrin) or naproxen (Aleve)    Acetaminophen (Tylenol)    Antidepressants and anti-seizure medicines, especially for nerve pain    Topical treatments such as patches or creams    Injections or nerve blocks    Chiropractic or osteopathic treatment    Acupuncture, massage, deep breathing, meditation, visual imagery, aromatherapy    Use heat or ice at the pain site    Physical therapy     Exercise    Stop smoking    Take part in therapy       Risks and side effects     Talk to your doctor before you start or decide to keep taking opioids. Possible side effects include:      Lowering your breathing rate enough to cause death    Overdose, including death, especially if taking higher than prescribed doses    Worse depression symptoms; less pleasure in things you usually enjoy    Feeling tired or sluggish    Slower thoughts or cloudy thinking    Being more sensitive to pain over time; pain is harder to control    Trouble sleeping or restless sleep    Changes in hormone levels (for example, less testosterone)    Changes in sex drive or ability to have sex    Constipation    Unsafe driving    Itching and sweating    Dizziness    Nausea, throwing up and dry mouth    What else should I know about opioids?    Opioids may lead to dependence, tolerance, or addiction.      Dependence means that if you stop or reduce the  medicine too quickly, you will have withdrawal symptoms. These include loose poop (diarrhea), jitters, flu-like symptoms, nervousness and tremors. Dependence is not the same as addiction.                   Tolerance means needing higher doses over time to get the same effect. This may increase the chance of serious side effects.      Addiction is when people improperly use a substance that harms their body, their mind or their relations with others. Use of opiates can cause a relapse of addiction if you have a history of drug or alcohol abuse.      People who have used opioids for a long time may have a lower quality of life, worse depression, higher levels of pain and more visits to doctors.    You can overdose on opioids. Take these steps to lower your risk of overdose:    1. Recognize the signs:  Signs of overdose include decrease or loss of consciousness (blackout), slowed breathing, trouble waking up and blue lips. If someone is worried about overdose, they should call 911.    2. Talk to your doctor about Narcan (naloxone).   If you are at risk for overdose, you may be given a prescription for Narcan. This medicine very quickly reverses the effects of opioids.   If you overdose, a friend or family member can give you Narcan while waiting for the ambulance. They need to know the signs of overdose and how to give Narcan.     3. Don't use alcohol or street drugs.   Taking them with opioids can cause death.    4. Do not take any of these medicines unless your doctor says its OK. Taking these with opioids can cause death:    Benzodiazepines, such as lorazepam (Ativan), alprazolam (Xanax) or diazepam (Valium)    Muscle relaxers, such as cyclobenzaprine (Flexeril)    Sleeping pills like zolpidem (Ambien)     Other opioids      How to keep you and other people safe while taking opioids:    1. Never share your opioids with others.  Opioid medicines are regulated by the Drug Enforcement Agency (GILDA). Selling or sharing  medications is a criminal act.    2. Be sure to store opioids in a secure place, locked up if possible. Young children can easily swallow them and overdose.    3. When you are traveling with your medicines, keep them in the original bottles. If you use a pill box, be sure you also carry a copy of your medicine list from your clinic or pharmacy.    4. Safe disposal of opioids    Most pharmacies have places to get rid of medicine, called disposal kiosks. Medicine disposal options are also available in every [facility]. Search your county and medication disposal to find more options. You can find more details at:  [email or web address]://[email or web address].[email or web address]-green/managing-unwanted-medications     I agree that my provider, clinic care team, and pharmacy may work with any city, state or federal law enforcement agency that investigates the misuse, sale, or other diversion of my controlled medicine. I will allow my provider to discuss my care with, or share a copy of, this agreement with any other treating provider, pharmacy or emergency room where I receive care.    I have read this agreement and have asked questions about anything I did not understand.      __________________________________________________  Patient Signature - Lizzy Tom   ______________________                      Date     __________________________________________________  Provider Signature - Oma Fuentes MD     ______________________                      Date     __________________________________________________  Witness Signature (required if provider not present while patient signing)     ______________________                      Date

## 2021-06-25 NOTE — TELEPHONE ENCOUNTER
Pt called states the pharm is wrong again, RX [facility] [address] [facility]. [facility] .

## 2021-06-25 NOTE — TELEPHONE ENCOUNTER
Controlled Substance Refill Request  Medication Name:   Requested Prescriptions     Pending Prescriptions Disp Refills     oxyCODONE (ROXICODONE) 10 mg immediate release tablet 150 tablet 0     Sig: Take 1 tablet by mouth every 4 hours as needed for chronic pain. Max of 5 tablets per day.     Date Last Fill: 5/20/21  Requested Pharmacy: [facility]  Submit electronically to pharmacy  Controlled Substance Agreement on file:   Encounter-Level CSA Scan Date - 04/21/2017:    Scan on 4/28/2017  8:34 AM by Martha Ordaz: [facility] NARCOTIC AGREEMENT        Last office visit:  5/14/21

## 2021-06-25 NOTE — TELEPHONE ENCOUNTER
Controlled Substance Refill Request  Medication Name:   Requested Prescriptions     Pending Prescriptions Disp Refills     oxyCODONE (OXYCONTIN) 10 mg 12 hr tablet 30 tablet 0     Sig: Take 1 tablet by mouth at bedtime for chronic back and jaw pain     Date Last Fill: 5/14/21  Requested Pharmacy: [facility]  Submit electronically to pharmacy  Controlled Substance Agreement on file:   Encounter-Level CSA Scan Date - 04/21/2017:    Scan on 4/28/2017  8:34 AM by Martha Ordaz: [facility] NARCOTIC AGREEMENT        Last office visit:  5/14/21

## 2021-06-25 NOTE — PROGRESS NOTES
Progress Notes by Nimesh Calle at 8/9/2017 12:00 PM     Author: Nimesh Calle Service: -- Author Type: Nurse Practitioner    Filed: 8/22/2017  3:46 PM Encounter Date: 8/9/2017 Status: Signed    : Nimesh Calle Internal Medicine/[facility]    Date of Service: 8/9/2017  Primary Provider: Oma Fuentes MD    Patient Care Team:  Oma Fuentes MD as PCP - General     ______________________________________________________________________     Patient's Pharmacy:    [id number] - [place], [place] - [address]  [address] [place] [address]  Phone: [phone] Fax: [phone]     Patient's Insurance:    Payor: BLUE CROSS / Plan: BLUE PLUS MA / Product Type: PMAP /     ______________________________________________________________________    Assessment:    1. Diarrhea - Acute sx's, likely viral but cannot r/o c-diff possibility d/t potential exposure at her work/lab; CBC appears stable, hx persistent leukocytosis.      ______________________________________________________________________      PHQ-2 Total Score: 0 (1/11/2017 12:00 PM)  PHQ-9 Total Score: 2 (1/11/2017 12:00 PM)     Plan:  Patient Instructions   1. Check CBC, C-diff  2. Drink more water/Gatorade to stay hydrated  3. Try Imodium OTC - Take as directed  4. If symptoms worsen or fail to improve return, follow up with your PCP for possible imaging.      ______________________________________________________________________     Lizzy Tom is 37 y.o. female who comes in today for:    Chief Complaint   Patient presents with   ? Diarrhea     started monday evening, no blood, dark colored and mucusy a little as well. 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       Patient Active Problem List   Diagnosis   ? Restless leg   ? Vitamin D Deficiency   ? Excessive Bleeding During Period (Menorrhagia)   ?  Muscle pain   ? Anxiety, generalized   ? Disorder of jaw   ? Low back pain   ? Sciatic Radiculopathy   ? Joint Pain, Localized In The Knee   ? Joint Pain, Localized In The Left Shoulder   ? Insomnia   ? Snoring (Symptom)   ? Leukocytosis   ? Skin sensation disturbance   ? Sudden Redness Of The Skin (Flushing)   ? Hematuria   ? Atypical Chest Pain   ? Jaw pain   ? Narcotic drug use     Current Outpatient Prescriptions   Medication Sig   ? oxyCODONE (ROXICODONE) 10 mg immediate release tablet Take 1 tablet (10 mg total) by mouth every 4 (four) hours as needed (max of 5 per day). for back and jaw pain.   ? hydrOXYzine (VISTARIL) 25 MG capsule Take 1-2 capsules by mouth 4 times a day   ? L norgest/e.estradiol-e.estrad (CAMRESE LO) 0.10 mg-20 mcg (84)/10 mcg (7) 3MPk Take 1 tablet by mouth daily.   ? levonorgestrel-ethinyl estradiol (NORDETTE) 0.15-0.03 mg per tablet Take 1 tablet by mouth daily.   ? lidocaine (XYLOCAINE) 5 % ointment Apply small amount to bilateral jaw up to 3 times daily.   ? ondansetron (ZOFRAN ODT) 4 MG disintegrating tablet Take 1 tablet (4 mg total) by mouth every 8 (eight) hours as needed for nausea.   ? tiZANidine (ZANAFLEX) 2 MG tablet Take 1-2 tablets (2-4 mg total) by mouth every 6 (six) hours as needed.     Social History     Social History   ? Marital status: Single     Spouse name: N/A   ? Number of children: N/A   ? Years of education: N/A     Occupational History   ? Not on file.     Social History Main Topics   ? Smoking status: Former Smoker     Packs/day: 0.50     Years: 17.00     Types: Cigarettes     Quit date: 7/1/2016   ? Smokeless tobacco: Never Used   ? Alcohol use No   ? Drug use: Yes     Special: Oxycodone   ? Sexual activity: Not Currently     Partners: Male     Birth control/ protection: None      Comment: single     Other Topics Concern   ? Not on file     Social History Narrative     ______________________________________________________________________     History of  "present illness: Lizzy Tom is a pleasant 37 y.o. female with a past medical history pertinent for jaw pain, low back pain, sciatic radiculopathy, leukocytosis, anxiety, menorrhagia, vitamin D deficiency, insomnia, and narcotic drug use who presents in clinic today for symptoms of diarrhea x 3 days.  She developed symptoms of stomach pains on Monday with sharp pains in lower abdomen below the umbilicus.  This would typically come with the BM and when she eats.  She is able to hold food down, but since she gets the \"bad pains with eating\" she hasn't been eating much.      Review of systems:   On ROS, the patient denies fevers, chills, sweats, nasal/sinus congestion/drainage, cough, shortness of breath, chest pain, N/V, or constipation.  Positive for symptoms as noted in the HPI.    ______________________________________________________________________    Wt Readings from Last 3 Encounters:   08/11/17 141 lb 7 oz (64.2 kg)   08/09/17 141 lb (64 kg)   07/31/17 145 lb (65.8 kg)     BP Readings from Last 3 Encounters:   08/11/17 112/62   08/09/17 102/60   07/31/17 121/72     /60 (Patient Site: Right Arm, Patient Position: Sitting, Cuff Size: Adult Regular)  Pulse (!) 57  Temp 98.3  F (36.8  C) (Oral)   Wt 141 lb (64 kg)  BMI 23.46 kg/m2     Physical Exam:  General Appearance: Alert, cooperative, no distress, appears stated age  Head: Normocephalic, without obvious abnormality, atraumatic  Eyes: PERRL, conjunctiva/corneas clear  Nose: Nares normal, septum midline,mucosa normal, no drainage  Throat: Lips, mucosa, and tongue normal; teeth and gums normal, no erythema, exudate, or thrush  Neck: Supple, symmetrical, trachea midline, no adenopathy  Back: Symmetric, no CVA tenderness  Lungs: Clear to auscultation bilaterally, respirations unlabored  Heart: Regular rate and rhythm, S1 and S2 normal, no murmur, rub, or gallop  Abdomen: Soft, slight tenderness lower abdomen below the umbilicus, bowel sounds active " all four quadrants, no masses, no organomegaly  Musculoskeletal: Normal range of motion. No joint swelling or deformity.   Extremities: Extremities normal, atraumatic, no cyanosis or edema  Lymph nodes: Cervical & supraclavicular nodes normal  Neurologic: She is alert & oriented ×3. She has normal gait.  Psychiatric: She has a normal mood and affect.     Diagnostics:  Results for orders placed or performed in visit on 08/09/17   HM2(CBC w/o Differential)   Result Value Ref Range    WBC 11.7 (H) 4.0 - 11.0 thou/uL    RBC 4.69 3.80 - 5.40 mill/uL    Hemoglobin 12.8 12.0 - 16.0 g/dL    Hematocrit 38.8 35.0 - 47.0 %    MCV 83 80 - 100 fL    MCH 27.2 27.0 - 34.0 pg    MCHC 32.9 32.0 - 36.0 g/dL    RDW 14.2 11.0 - 14.5 %    Platelets 302 140 - 440 thou/uL    MPV 7.4 7.0 - 10.0 fL       Nimesh Calle CNP  Internal Medicine  [facility]     Return if symptoms worsen or fail to improve, for Follow-up with your Primary Care Provider.

## 2021-06-25 NOTE — TELEPHONE ENCOUNTER
Pt states [facility] was out of the oxycodone 10 mg immediate release, please send to [facility]

## 2021-06-25 NOTE — TELEPHONE ENCOUNTER
Controlled Substance Refill Request  Medication Name:   Requested Prescriptions     Pending Prescriptions Disp Refills     oxyCODONE (ROXICODONE) 10 mg immediate release tablet 150 tablet 0     Sig: Take 1 tablet by mouth every 4 hours as needed for chronic pain. Max of 5 tablets per day.     Date Last Fill: 6/14/21  Is patient out of medication?: N/A  Patient notified refills processed within 3 business days:  Yes  Requested Pharmacy: [facility]   Submit electronically to pharmacy  Controlled Substance Agreement on file:   Encounter-Level CSA Scan Date - 04/21/2017:    Scan on 4/28/2017  8:34 AM by Martha Ordaz: [facility] NARCOTIC AGREEMENT        Last office visit:  5/14/21    *patient tried to  prescription at [facility] but they told her that they were out. Can this be resent to the other pharmacy on file? [facility]. *
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
## 2021-07-03 NOTE — ADDENDUM NOTE
Addendum Note by Oma Shields MD at 4/19/2019  3:43 PM     Author: Oma Shields MD Service: -- Author Type: Physician    Filed: 4/19/2019  3:43 PM Encounter Date: 4/19/2019 Status: Signed    : Oma Shields MD (Physician)    Addended by: OMA SHIELDS on: 4/19/2019 03:43 PM        Modules accepted: Orders

## 2021-07-03 NOTE — ADDENDUM NOTE
Addendum Note by Nuria Kiran RN at 4/5/2018  3:38 PM     Author: Nuria Kiran RN Service: -- Author Type: Registered Nurse    Filed: 4/5/2018  3:38 PM Encounter Date: 4/4/2018 Status: Signed    : Nuria Kiran RN (Registered Nurse)    Addended by: NURIA KIRAN on: 4/5/2018 03:38 PM        Modules accepted: Orders

## 2021-07-03 NOTE — ADDENDUM NOTE
Addendum Note by Oma Shields MD at 4/19/2017  3:40 PM     Author: Oma Shields MD Service: -- Author Type: Physician    Filed: 4/19/2017  3:40 PM Encounter Date: 4/18/2017 Status: Signed    : Oma Shields MD (Physician)    Addended by: OMA SHIELDS on: 4/19/2017 03:40 PM        Modules accepted: Orders

## 2021-07-03 NOTE — ADDENDUM NOTE
Addendum Note by Oma Shields MD at 1/22/2021 10:03 AM     Author: Oma Shields MD Service: -- Author Type: Physician    Filed: 1/22/2021 10:03 AM Encounter Date: 10/21/2020 Status: Signed    : Oma Shields MD (Physician)    Addended by: OMA SHIELDS on: 1/22/2021 10:03 AM        Modules accepted: Orders

## 2021-07-03 NOTE — ADDENDUM NOTE
Addendum Note by Oma Shields MD at 9/22/2020  8:33 AM     Author: Oma Shields MD Service: -- Author Type: Physician    Filed: 9/22/2020  8:33 AM Encounter Date: 9/7/2020 Status: Signed    : Oma Shields MD (Physician)    Addended by: OMA SHIELDS on: 9/22/2020 08:33 AM        Modules accepted: Orders

## 2021-07-03 NOTE — ADDENDUM NOTE
Addendum Note by Jair Ashraf DO at 4/21/2017  5:18 PM     Author: Jair Ashraf DO Service: -- Author Type: Physician    Filed: 4/21/2017  5:18 PM Encounter Date: 4/18/2017 Status: Signed    : Jair Ashraf DO (Physician)    Addended by: JAIR ASHRAF on: 4/21/2017 05:18 PM        Modules accepted: Orders

## 2021-07-03 NOTE — ADDENDUM NOTE
Addendum Note by Jen Benítez MD at 4/4/2018  4:12 PM     Author: Jen Benítez MD Service: -- Author Type: Physician    Filed: 4/4/2018  4:12 PM Encounter Date: 4/4/2018 Status: Signed    : Jen Benítez MD (Physician)    Addended by: JEN BENÍTEZ on: 4/4/2018 04:12 PM        Modules accepted: Orders

## 2021-07-03 NOTE — ADDENDUM NOTE
Addendum Note by Oma Shields MD at 8/7/2019  4:08 PM     Author: Oma Shields MD Service: -- Author Type: Physician    Filed: 8/7/2019  4:08 PM Encounter Date: 8/7/2019 Status: Signed    : Oma Shields MD (Physician)    Addended by: OMA SHIELDS on: 8/7/2019 04:08 PM        Modules accepted: Orders

## 2021-07-03 NOTE — ADDENDUM NOTE
Addendum Note by Oma Shields MD at 10/12/2020 12:20 PM     Author: Oma Shields MD Service: -- Author Type: Physician    Filed: 10/12/2020 12:20 PM Encounter Date: 9/7/2020 Status: Signed    : Oma Shields MD (Physician)    Addended by: OMA SHIELDS on: 10/12/2020 12:20 PM        Modules accepted: Orders

## 2021-07-03 NOTE — ADDENDUM NOTE
Addendum Note by Oma Shields MD at 10/5/2020  3:07 PM     Author: Oma Shields MD Service: -- Author Type: Physician    Filed: 10/5/2020  3:07 PM Encounter Date: 9/7/2020 Status: Signed    : Oma Shields MD (Physician)    Addended by: OMA SHIELDS on: 10/5/2020 03:07 PM        Modules accepted: Orders

## 2021-07-03 NOTE — ADDENDUM NOTE
Addendum Note by Jair Ashraf DO at 4/23/2017  4:48 PM     Author: Jair Ashraf DO Service: -- Author Type: Physician    Filed: 4/23/2017  4:48 PM Encounter Date: 4/18/2017 Status: Signed    : Jair Ashraf DO (Physician)    Addended by: JAIR ASHRAF on: 4/23/2017 04:48 PM        Modules accepted: Orders

## 2021-07-03 NOTE — ADDENDUM NOTE
Addendum Note by Oma Shields MD at 9/28/2020 12:58 PM     Author: Oma Shields MD Service: -- Author Type: Physician    Filed: 9/28/2020 12:58 PM Encounter Date: 9/7/2020 Status: Signed    : Oma Shields MD (Physician)    Addended by: OMA SHIELDS on: 9/28/2020 12:58 PM        Modules accepted: Orders

## 2021-07-03 NOTE — ADDENDUM NOTE
Addendum Note by Fauzia Mccrary CNP at 10/5/2017 11:59 PM     Author: Fauzia Mccrary CNP Service: -- Author Type: Nurse Practitioner    Filed: 10/12/2017  4:56 PM Date of Service: 10/5/2017 11:59 PM Status: Signed    : Fauzia Mccrary CNP (Nurse Practitioner)    Encounter addended by: Fauzia Mccrary CNP on: 10/12/2017  4:56 PM<BR>     Actions taken: Sign clinical note

## 2021-07-07 ENCOUNTER — COMMUNICATION - HEALTHEAST (OUTPATIENT)
Dept: FAMILY MEDICINE | Facility: CLINIC | Age: 41
End: 2021-07-07

## 2021-07-07 DIAGNOSIS — G89.4 CHRONIC PAIN SYNDROME: ICD-10-CM

## 2021-07-08 ENCOUNTER — COMMUNICATION - HEALTHEAST (OUTPATIENT)
Dept: FAMILY MEDICINE | Facility: CLINIC | Age: 41
End: 2021-07-08

## 2021-07-08 NOTE — TELEPHONE ENCOUNTER
Telephone Encounter by Lorena De La Cruz, RN at 7/7/2021  7:38 PM     Author: Lorena De La Cruz RN Service: -- Author Type: Registered Nurse    Filed: 7/7/2021  7:40 PM Encounter Date: 7/7/2021 Status: Signed    : Lorena De La Cruz RN (Registered Nurse)       Controlled Substance Refill Request  Medication Name:   Requested Prescriptions     Pending Prescriptions Disp Refills   ? oxyCODONE (OXYCONTIN) 10 mg 12 hr tablet 30 tablet 0     Sig: Take 1 tablet by mouth at bedtime for chronic back and jaw pain     Date Last Fill: 6/11/21  Requested Pharmacy: [facility]  Submit electronically to pharmacy  Controlled Substance Agreement on file:   Encounter-Level CSA Scan Date - 04/21/2017:    Scan on 4/28/2017  8:34 AM by Martha Ordaz: PCC NARCOTIC AGREEMENT        Last office visit:  5/14/21  Lorena De La Cruz RN, MA  [facility]    Triage Nurse Advisor

## 2021-07-08 NOTE — TELEPHONE ENCOUNTER
Telephone Encounter by Oma Fuentes MD at 7/8/2021  4:42 PM     Author: Oma Fuentes MD Service: -- Author Type: Physician    Filed: 7/8/2021  4:51 PM Encounter Date: 7/7/2021 Status: Signed    : Oma Fuentes MD (Physician)        reviewed.  OK for refill

## 2021-07-14 PROBLEM — O09.522 MULTIGRAVIDA OF ADVANCED MATERNAL AGE IN SECOND TRIMESTER: Status: RESOLVED | Noted: 2020-04-23 | Resolved: 2020-09-10

## 2021-07-14 PROBLEM — M54.9 BACK PAIN AFFECTING PREGNANCY IN THIRD TRIMESTER: Status: RESOLVED | Noted: 2020-08-07 | Resolved: 2021-01-31

## 2021-07-14 PROBLEM — O09.32 LATE PRENATAL CARE AFFECTING PREGNANCY IN SECOND TRIMESTER: Status: RESOLVED | Noted: 2020-03-31 | Resolved: 2021-05-14

## 2021-07-14 PROBLEM — O99.891 BACK PAIN AFFECTING PREGNANCY IN THIRD TRIMESTER: Status: RESOLVED | Noted: 2020-08-07 | Resolved: 2021-01-31

## 2021-07-14 PROBLEM — Z34.90 PREGNANCY: Status: RESOLVED | Noted: 2020-04-23 | Resolved: 2020-09-10

## 2021-07-14 PROBLEM — Z34.90 TERM PREGNANCY: Status: RESOLVED | Noted: 2020-08-05 | Resolved: 2021-04-21

## 2021-07-14 PROBLEM — O09.90 HIGH-RISK PREGNANCY, UNSPECIFIED TRIMESTER: Status: RESOLVED | Noted: 2020-04-23 | Resolved: 2020-09-10

## 2021-07-30 ENCOUNTER — MYC MEDICAL ADVICE (OUTPATIENT)
Dept: FAMILY MEDICINE | Facility: CLINIC | Age: 41
End: 2021-07-30

## 2021-07-30 DIAGNOSIS — G89.4 CHRONIC PAIN SYNDROME: Primary | ICD-10-CM

## 2021-08-03 RX ORDER — OXYCODONE HCL 10 MG/1
10 TABLET, FILM COATED, EXTENDED RELEASE ORAL DAILY
COMMUNITY
End: 2021-08-03

## 2021-08-03 RX ORDER — OXYCODONE HCL 10 MG/1
10 TABLET, FILM COATED, EXTENDED RELEASE ORAL DAILY
Qty: 30 TABLET | Refills: 0 | Status: SHIPPED | OUTPATIENT
Start: 2021-08-03 | End: 2021-08-27

## 2021-08-03 RX ORDER — OXYCODONE HYDROCHLORIDE 10 MG/1
10 TABLET ORAL EVERY 4 HOURS
Qty: 30 TABLET | Refills: 0 | Status: SHIPPED | OUTPATIENT
Start: 2021-08-03 | End: 2021-08-05

## 2021-08-03 RX ORDER — OXYCODONE HYDROCHLORIDE 10 MG/1
10 TABLET ORAL EVERY 4 HOURS
COMMUNITY
Start: 2021-06-14 | End: 2021-08-03

## 2021-08-04 ENCOUNTER — VIRTUAL VISIT (OUTPATIENT)
Dept: FAMILY MEDICINE | Facility: CLINIC | Age: 41
End: 2021-08-04
Payer: MEDICAID

## 2021-08-04 DIAGNOSIS — F32.9 MAJOR DEPRESSION, CHRONIC: Primary | ICD-10-CM

## 2021-08-04 DIAGNOSIS — G89.4 CHRONIC PAIN SYNDROME: ICD-10-CM

## 2021-08-04 PROCEDURE — 99214 OFFICE O/P EST MOD 30 MIN: CPT | Mod: 95 | Performed by: FAMILY MEDICINE

## 2021-08-04 RX ORDER — ACETAMINOPHEN AND CODEINE PHOSPHATE 120; 12 MG/5ML; MG/5ML
0.35 SOLUTION ORAL
COMMUNITY
Start: 2020-11-10 | End: 2022-02-17

## 2021-08-04 RX ORDER — DICLOFENAC POTASSIUM 50 MG/1
50 TABLET, FILM COATED ORAL
COMMUNITY
Start: 2021-06-28 | End: 2021-11-19

## 2021-08-04 RX ORDER — DULOXETIN HYDROCHLORIDE 30 MG/1
30 CAPSULE, DELAYED RELEASE ORAL DAILY
Qty: 90 CAPSULE | Refills: 3 | Status: SHIPPED | OUTPATIENT
Start: 2021-08-04 | End: 2022-03-31

## 2021-08-04 RX ORDER — NALOXONE HYDROCHLORIDE 4 MG/.1ML
1 SPRAY NASAL
COMMUNITY
Start: 2020-08-12

## 2021-08-04 RX ORDER — LANOLIN ALCOHOL/MO/W.PET/CERES
3 CREAM (GRAM) TOPICAL
COMMUNITY
Start: 2021-05-14

## 2021-08-04 NOTE — PROGRESS NOTES
Lizzy is a 41 year old who is being evaluated via a billable telephone visit.      What phone number would you like to be contacted at? [phone]    How would you like to obtain your AVS? MyChart    Assessment & Plan     Major depression, chronic  We discussed that her symptoms are consistent with depression.  Recommended that she consider treatment with medication.  Also recommended that she consider counseling.  She will look into virtual options that may be available to her.  Encouraged self-care such as regular exercise, healthy diet.  Also suggested mindfulness and meditation.  Discussed medication options.  She has taken duloxetine in the past and did not have any significant side effects that she recalls.  Discussed that I think this would be a good medication for her as it also has benefits for management of chronic pain.  She was willing to restart this medication.  Prescription for this was sent to pharmacy.  We will start duloxetine 30 mg daily and then increase dose as needed.  She was counseled on use of medication and side effects.  [facility]  - DULoxetine (CYMBALTA) 30 MG capsule  Dispense: 90 capsule; Refill: 3    Chronic pain syndrome  She was provided with short-term supply of oxycodone yesterday.  Reviewed [place]  and will send in remaining monthly supply of oxycodone to her pharmacy.  She has appointment to see pain management specialist in early September.  - oxyCODONE IR (ROXICODONE) 10 MG tablet  Dispense: 120 tablet; Refill: 0               Tobacco Cessation:   reports that she has been smoking cigarettes. She has a 8.50 pack-year smoking history. She has never used smokeless tobacco.          No follow-ups on file.    Oma Fuentes MD  [facility]   Lizzy is a 41 year old who presents for the following health issues     HPI   She is evaluated via telephone visit today to discuss symptoms of  depression.  She reports that she really has been struggling over the past year with depressed mood symptoms.  Lately, symptoms have gotten worse and she contacted this provider through [employer] for advice and recommendations.  She reports that she has been more emotional.  She reports no motivation to do anything besides a very basic things that she needs to do.  Her daughter's first birthday is coming up and that she is feeling overwhelmed at having to make plans for the birthday party, clean the house and prepare for guests.  She has had some relationship difficulties with her significant other as well.  She reports that she has not had any suicidal ideation or other thoughts of harming herself.  She has tried bupropion in the past to help with smoking cessation.  She did not continue taking the medication because it was not helpful for smoking cessation.  She also has taken duloxetine in the past and it was prescribed for chronic pain.  Upon chart review, it appears that this was discontinued around 2018 but she is not sure why she stopped taking the medication.  She does not recall having any significant adverse side effects.  She is also in need of a refill of her medication for management of chronic pain.  She did establish care with a nurse practitioner who is much closer to where she is currently living and has been referred to a pain management specialist but she is not able to see the pain management specialist until the first week of September.  We reviewed and updated her medications.  She has no other concerns or questions.        Review of Systems   Constitutional, HEENT, cardiovascular, pulmonary, gi and gu systems are negative, except as otherwise noted.      Objective           Vitals:  No vitals were obtained today due to virtual visit.    Physical Exam   healthy, alert and no distress  PSYCH: Alert and oriented times 3; coherent speech, normal   rate and volume, able to articulate logical  thoughts, able   to abstract reason, no tangential thoughts, no hallucinations   or delusions  Her affect is normal  RESP: No cough, no audible wheezing, able to talk in full sentences  Remainder of exam unable to be completed due to telephone visits      ARTIS-7: 8  PHQ-9: 15                Phone call duration: 17 minutes

## 2021-08-05 RX ORDER — OXYCODONE HYDROCHLORIDE 10 MG/1
10 TABLET ORAL EVERY 4 HOURS
Qty: 120 TABLET | Refills: 0 | Status: SHIPPED | OUTPATIENT
Start: 2021-08-05 | End: 2021-08-30

## 2021-08-14 ENCOUNTER — HEALTH MAINTENANCE LETTER (OUTPATIENT)
Age: 41
End: 2021-08-14

## 2021-08-27 RX ORDER — OXYCODONE HCL 10 MG/1
10 TABLET, FILM COATED, EXTENDED RELEASE ORAL DAILY
Qty: 30 TABLET | Refills: 0 | Status: SHIPPED | OUTPATIENT
Start: 2021-08-31 | End: 2021-09-27

## 2021-08-30 ENCOUNTER — MYC REFILL (OUTPATIENT)
Dept: FAMILY MEDICINE | Facility: CLINIC | Age: 41
End: 2021-08-30

## 2021-08-30 ENCOUNTER — MYC MEDICAL ADVICE (OUTPATIENT)
Dept: FAMILY MEDICINE | Facility: CLINIC | Age: 41
End: 2021-08-30

## 2021-08-30 DIAGNOSIS — G89.4 CHRONIC PAIN SYNDROME: ICD-10-CM

## 2021-08-31 NOTE — TELEPHONE ENCOUNTER
Routing refill request to provider for review/approval because:  Controlled refill request.  Possible a duplicate request?    Last Written Prescription:         Last office visit provider:  8/4/21     Requested Prescriptions   Pending Prescriptions Disp Refills     oxyCODONE IR (ROXICODONE) 10 MG tablet 120 tablet 0     Sig: Take 1 tablet (10 mg) by mouth every 4 hours       There is no refill protocol information for this order          Kinza Tristan RN 08/30/21 7:47 PM

## 2021-09-01 RX ORDER — OXYCODONE HYDROCHLORIDE 10 MG/1
10 TABLET ORAL EVERY 4 HOURS
Qty: 120 TABLET | Refills: 0 | Status: SHIPPED | OUTPATIENT
Start: 2021-09-01 | End: 2021-09-01

## 2021-09-01 RX ORDER — OXYCODONE HYDROCHLORIDE 10 MG/1
10 TABLET ORAL EVERY 4 HOURS
Qty: 150 TABLET | Refills: 0 | Status: SHIPPED | OUTPATIENT
Start: 2021-09-01 | End: 2021-09-27

## 2021-09-01 NOTE — TELEPHONE ENCOUNTER
Please also let pt's pharmacy know that the quantity of oxycodone should be 150 tablets, not 120 tablets.  She typically gets 150 tablets per 30 days.  I sent a new prescription so the initial prescription for 120 tablets should be canceled.

## 2021-09-01 NOTE — TELEPHONE ENCOUNTER
Prabha, can you please call the pharmacy and let them know the messages below?    1. Cancel rx for 120 tabs. Correct quantity is for 150 tabs    2. Ok to refill today.    Thanks!

## 2021-09-27 RX ORDER — OXYCODONE HYDROCHLORIDE 10 MG/1
10 TABLET ORAL EVERY 4 HOURS
Qty: 150 TABLET | Refills: 0 | Status: SHIPPED | OUTPATIENT
Start: 2021-09-27 | End: 2021-10-23

## 2021-09-27 RX ORDER — OXYCODONE HCL 10 MG/1
10 TABLET, FILM COATED, EXTENDED RELEASE ORAL DAILY
Qty: 30 TABLET | Refills: 0 | Status: SHIPPED | OUTPATIENT
Start: 2021-09-27 | End: 2021-10-23

## 2021-10-10 ENCOUNTER — HEALTH MAINTENANCE LETTER (OUTPATIENT)
Age: 41
End: 2021-10-10

## 2021-10-22 ENCOUNTER — MYC MEDICAL ADVICE (OUTPATIENT)
Dept: FAMILY MEDICINE | Facility: CLINIC | Age: 41
End: 2021-10-22

## 2021-10-22 DIAGNOSIS — G89.4 CHRONIC PAIN SYNDROME: ICD-10-CM

## 2021-10-23 RX ORDER — OXYCODONE HYDROCHLORIDE 10 MG/1
10 TABLET ORAL EVERY 4 HOURS
Qty: 150 TABLET | Refills: 0 | Status: SHIPPED | OUTPATIENT
Start: 2021-10-23 | End: 2021-11-19

## 2021-10-23 RX ORDER — OXYCODONE HCL 10 MG/1
10 TABLET, FILM COATED, EXTENDED RELEASE ORAL DAILY
Qty: 30 TABLET | Refills: 0 | Status: SHIPPED | OUTPATIENT
Start: 2021-10-23 | End: 2021-11-19

## 2021-11-18 ENCOUNTER — MYC MEDICAL ADVICE (OUTPATIENT)
Dept: FAMILY MEDICINE | Facility: CLINIC | Age: 41
End: 2021-11-18
Payer: COMMERCIAL

## 2021-11-18 DIAGNOSIS — G89.4 CHRONIC PAIN SYNDROME: Primary | ICD-10-CM

## 2021-11-19 ENCOUNTER — MYC MEDICAL ADVICE (OUTPATIENT)
Dept: FAMILY MEDICINE | Facility: CLINIC | Age: 41
End: 2021-11-19
Payer: COMMERCIAL

## 2021-11-19 DIAGNOSIS — G89.4 CHRONIC PAIN SYNDROME: ICD-10-CM

## 2021-11-19 RX ORDER — DICLOFENAC POTASSIUM 50 MG/1
50 TABLET, FILM COATED ORAL 3 TIMES DAILY PRN
Qty: 60 TABLET | Refills: 0 | Status: SHIPPED | OUTPATIENT
Start: 2021-11-19 | End: 2022-03-31

## 2021-11-19 RX ORDER — OXYCODONE HYDROCHLORIDE 10 MG/1
10 TABLET ORAL EVERY 4 HOURS
Qty: 150 TABLET | Refills: 0 | Status: SHIPPED | OUTPATIENT
Start: 2021-11-19 | End: 2021-11-20

## 2021-11-19 RX ORDER — OXYCODONE HCL 10 MG/1
10 TABLET, FILM COATED, EXTENDED RELEASE ORAL DAILY
Qty: 30 TABLET | Refills: 0 | Status: SHIPPED | OUTPATIENT
Start: 2021-11-19 | End: 2021-12-16

## 2021-11-19 NOTE — TELEPHONE ENCOUNTER
Last OV: 8/4/2021  Next OV: N/A    Pending Prescriptions:                       Disp   Refills    diclofenac (CATAFLAM) 50 MG tablet        60 tab*0            Sig: Take 1 tablet (50 mg) by mouth 3 times daily as           needed for moderate pain       [FreeTextEntry1] : Chart reviewed. Blood drawn as per physician request/order.\par \par

## 2021-12-16 ENCOUNTER — MYC MEDICAL ADVICE (OUTPATIENT)
Dept: FAMILY MEDICINE | Facility: CLINIC | Age: 41
End: 2021-12-16
Payer: COMMERCIAL

## 2021-12-16 DIAGNOSIS — G89.4 CHRONIC PAIN SYNDROME: ICD-10-CM

## 2021-12-16 RX ORDER — OXYCODONE HCL 10 MG/1
10 TABLET, FILM COATED, EXTENDED RELEASE ORAL DAILY
Qty: 30 TABLET | Refills: 0 | Status: SHIPPED | OUTPATIENT
Start: 2021-12-16 | End: 2022-01-10

## 2021-12-16 RX ORDER — OXYCODONE HYDROCHLORIDE 10 MG/1
10 TABLET ORAL EVERY 4 HOURS
Qty: 150 TABLET | Refills: 0 | Status: SHIPPED | OUTPATIENT
Start: 2021-12-16 | End: 2022-01-10

## 2022-01-10 ENCOUNTER — MYC REFILL (OUTPATIENT)
Dept: FAMILY MEDICINE | Facility: CLINIC | Age: 42
End: 2022-01-10
Payer: COMMERCIAL

## 2022-01-10 DIAGNOSIS — G89.4 CHRONIC PAIN SYNDROME: ICD-10-CM

## 2022-01-11 RX ORDER — OXYCODONE HYDROCHLORIDE 10 MG/1
10 TABLET ORAL EVERY 4 HOURS
Qty: 150 TABLET | Refills: 0 | Status: SHIPPED | OUTPATIENT
Start: 2022-01-11 | End: 2022-02-05

## 2022-01-11 RX ORDER — OXYCODONE HCL 10 MG/1
10 TABLET, FILM COATED, EXTENDED RELEASE ORAL DAILY
Qty: 30 TABLET | Refills: 0 | Status: SHIPPED | OUTPATIENT
Start: 2022-01-11 | End: 2022-02-15

## 2022-01-13 ENCOUNTER — TELEPHONE (OUTPATIENT)
Dept: FAMILY MEDICINE | Facility: CLINIC | Age: 42
End: 2022-01-13
Payer: COMMERCIAL

## 2022-01-13 NOTE — TELEPHONE ENCOUNTER
Prior Auth request    oxyCODONE (OXYCONTIN) 10 MG 12 hr tablet 30 tablet 0 1/11/2022  No   Sig - Route: Take 1 tablet (10 mg) by mouth daily - Oral   Sent to pharmacy as: oxyCODONE HCl ER 10 MG Oral Tablet ER 12 Hour Abuse-Deterrent (OxyCONTIN)   Class: E-Prescribe   Earliest Fill Date: 1/11/2022   Order: [id number]   E-Prescribing Status: Receipt confirmed by pharmacy (1/11/2022  4:35 PM CST)

## 2022-01-17 NOTE — TELEPHONE ENCOUNTER
Central Prior Authorization Team   Phone: [phone]    PA Initiation    Medication: OxyCONTIN 10MG er tablets  Insurance Company: [employer] - Phone [phone] Fax [phone]  Pharmacy Filling the Rx: [facility] - [place], [place] - [address] [place] [address]  Filling Pharmacy Phone:    Filling Pharmacy Fax:    Start Date: 1/17/2022

## 2022-01-19 NOTE — TELEPHONE ENCOUNTER
Prior Authorization Approval    Authorization Effective Date: 1/11/2022  Authorization Expiration Date: 7/11/2022  Medication: OxyCONTIN 10MG er tablets  Approved Dose/Quantity:    Reference #:     Insurance Company: [employer] - Phone [phone] Fax [phone]  Expected CoPay:       CoPay Card Available:      Foundation Assistance Needed:    Which Pharmacy is filling the prescription (Not needed for infusion/clinic administered): [facility] - [place], [place] - [address] [place] [address]  Pharmacy Notified: Yes  Patient Notified: Yes

## 2022-02-05 ENCOUNTER — MYC REFILL (OUTPATIENT)
Dept: FAMILY MEDICINE | Facility: CLINIC | Age: 42
End: 2022-02-05
Payer: COMMERCIAL

## 2022-02-05 ENCOUNTER — MYC MEDICAL ADVICE (OUTPATIENT)
Dept: FAMILY MEDICINE | Facility: CLINIC | Age: 42
End: 2022-02-05
Payer: COMMERCIAL

## 2022-02-05 DIAGNOSIS — G89.4 CHRONIC PAIN SYNDROME: ICD-10-CM

## 2022-02-07 RX ORDER — OXYCODONE HYDROCHLORIDE 10 MG/1
10 TABLET ORAL EVERY 4 HOURS
Qty: 150 TABLET | Refills: 0 | Status: SHIPPED | OUTPATIENT
Start: 2022-02-07 | End: 2022-02-28

## 2022-02-07 NOTE — TELEPHONE ENCOUNTER
Routing refill request to provider for review/approval because:  Controlled substance    Last Written Prescription Date:  1/11/22  Last Fill Quantity: 150,  # refills: 0   Last office visit provider:  8/4/21     Requested Prescriptions   Pending Prescriptions Disp Refills     oxyCODONE IR (ROXICODONE) 10 MG tablet 150 tablet 0     Sig: Take 1 tablet (10 mg) by mouth every 4 hours       There is no refill protocol information for this order          Jacquie Aguayo RN 02/07/22 9:05 AM

## 2022-02-14 ENCOUNTER — MYC MEDICAL ADVICE (OUTPATIENT)
Dept: FAMILY MEDICINE | Facility: CLINIC | Age: 42
End: 2022-02-14
Payer: COMMERCIAL

## 2022-02-14 DIAGNOSIS — G89.4 CHRONIC PAIN SYNDROME: ICD-10-CM

## 2022-02-15 RX ORDER — OXYCODONE HCL 10 MG/1
10 TABLET, FILM COATED, EXTENDED RELEASE ORAL DAILY
Qty: 30 TABLET | Refills: 0 | Status: SHIPPED | OUTPATIENT
Start: 2022-02-15 | End: 2022-03-11

## 2022-02-17 DIAGNOSIS — Z30.9 CONTRACEPTIVE MANAGEMENT: Primary | ICD-10-CM

## 2022-02-17 RX ORDER — ACETAMINOPHEN AND CODEINE PHOSPHATE 120; 12 MG/5ML; MG/5ML
0.35 SOLUTION ORAL DAILY
Qty: 84 TABLET | Refills: 3 | Status: SHIPPED | OUTPATIENT
Start: 2022-02-17 | End: 2022-03-24

## 2022-02-21 ENCOUNTER — MYC MEDICAL ADVICE (OUTPATIENT)
Dept: FAMILY MEDICINE | Facility: CLINIC | Age: 42
End: 2022-02-21

## 2022-02-21 ENCOUNTER — TELEPHONE (OUTPATIENT)
Dept: FAMILY MEDICINE | Facility: CLINIC | Age: 42
End: 2022-02-21

## 2022-02-21 ENCOUNTER — E-VISIT (OUTPATIENT)
Dept: FAMILY MEDICINE | Facility: CLINIC | Age: 42
End: 2022-02-21
Payer: COMMERCIAL

## 2022-02-21 DIAGNOSIS — M54.9 BACK PAIN, UNSPECIFIED BACK LOCATION, UNSPECIFIED BACK PAIN LATERALITY, UNSPECIFIED CHRONICITY: Primary | ICD-10-CM

## 2022-02-21 PROCEDURE — 99207 PR NON-BILLABLE SERV PER CHARTING: CPT | Performed by: FAMILY MEDICINE
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
## 2022-02-21 NOTE — PATIENT INSTRUCTIONS
Thank you for choosing us for your care. I think an in-clinic visit would be best next steps based on your symptoms. Please schedule a clinic appointment; you won t be charged for this eVisit.      You can schedule an appointment right here in [facility], or call [phone]

## 2022-02-22 DIAGNOSIS — M54.50 ACUTE LOW BACK PAIN, UNSPECIFIED BACK PAIN LATERALITY, UNSPECIFIED WHETHER SCIATICA PRESENT: Primary | ICD-10-CM

## 2022-02-22 RX ORDER — OXYCODONE HYDROCHLORIDE 5 MG/1
5 TABLET ORAL EVERY 4 HOURS PRN
Qty: 6 TABLET | Refills: 0 | Status: SHIPPED | OUTPATIENT
Start: 2022-02-22 | End: 2022-02-23

## 2022-02-22 NOTE — TELEPHONE ENCOUNTER
Reached out to pt/Lizzy, relayed mess per Dr Lani Washburn.    Lizzy stated her understanding and will see us tomorrow 01.23.2022 for her apt with Lani Rodriguez MD [facility] Team Pool 6 hours ago (6:57 AM)     JR    Because she will see Dr. Fuentes tomorrow, I think it is reasonable to have her just take 1 tablet until she is seen on Wednesday.    Message text

## 2022-02-23 ENCOUNTER — VIRTUAL VISIT (OUTPATIENT)
Dept: FAMILY MEDICINE | Facility: CLINIC | Age: 42
End: 2022-02-23
Payer: COMMERCIAL

## 2022-02-23 DIAGNOSIS — M54.50 ACUTE LOW BACK PAIN, UNSPECIFIED BACK PAIN LATERALITY, UNSPECIFIED WHETHER SCIATICA PRESENT: ICD-10-CM
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
## 2022-02-23 PROCEDURE — 99213 OFFICE O/P EST LOW 20 MIN: CPT | Mod: 95 | Performed by: FAMILY MEDICINE

## 2022-02-23 RX ORDER — OXYCODONE HYDROCHLORIDE 5 MG/1
5 TABLET ORAL EVERY 4 HOURS PRN
Qty: 35 TABLET | Refills: 0 | Status: SHIPPED | OUTPATIENT
Start: 2022-02-23 | End: 2022-02-28
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
## 2022-02-28 ENCOUNTER — MYC MEDICAL ADVICE (OUTPATIENT)
Dept: FAMILY MEDICINE | Facility: CLINIC | Age: 42
End: 2022-02-28
Payer: COMMERCIAL

## 2022-02-28 DIAGNOSIS — G89.4 CHRONIC PAIN SYNDROME: ICD-10-CM

## 2022-02-28 RX ORDER — OXYCODONE HYDROCHLORIDE 10 MG/1
10 TABLET ORAL EVERY 4 HOURS
Qty: 150 TABLET | Refills: 0 | Status: SHIPPED | OUTPATIENT
Start: 2022-02-28 | End: 2022-03-24

## 2022-03-11 ENCOUNTER — MYC MEDICAL ADVICE (OUTPATIENT)
Dept: FAMILY MEDICINE | Facility: CLINIC | Age: 42
End: 2022-03-11
Payer: COMMERCIAL

## 2022-03-11 DIAGNOSIS — G89.4 CHRONIC PAIN SYNDROME: ICD-10-CM

## 2022-03-11 RX ORDER — OXYCODONE HCL 10 MG/1
10 TABLET, FILM COATED, EXTENDED RELEASE ORAL DAILY
Qty: 30 TABLET | Refills: 0 | Status: SHIPPED | OUTPATIENT
Start: 2022-03-11 | End: 2022-03-31

## 2022-03-11 RX ORDER — OXYCODONE HCL 10 MG/1
10 TABLET, FILM COATED, EXTENDED RELEASE ORAL DAILY
Qty: 30 TABLET | Refills: 0 | Status: SHIPPED | OUTPATIENT
Start: 2022-03-11 | End: 2022-03-11

## 2022-03-11 NOTE — TELEPHONE ENCOUNTER
Pt called stating PCP sent prescription to the wrong pharmacy. She wants oxycodone to be sent to: [facility] [id number] [place] [address]. Thanks.

## 2022-03-24 ENCOUNTER — MYC MEDICAL ADVICE (OUTPATIENT)
Dept: FAMILY MEDICINE | Facility: CLINIC | Age: 42
End: 2022-03-24
Payer: COMMERCIAL

## 2022-03-24 DIAGNOSIS — Z30.41 ENCOUNTER FOR SURVEILLANCE OF CONTRACEPTIVE PILLS: ICD-10-CM

## 2022-03-24 DIAGNOSIS — G89.4 CHRONIC PAIN SYNDROME: ICD-10-CM

## 2022-03-24 DIAGNOSIS — Z30.9 CONTRACEPTIVE MANAGEMENT: ICD-10-CM

## 2022-03-24 DIAGNOSIS — Z30.09 GENERAL COUNSELING FOR PRESCRIPTION OF ORAL CONTRACEPTIVES: Primary | ICD-10-CM

## 2022-03-24 RX ORDER — OXYCODONE HYDROCHLORIDE 10 MG/1
10 TABLET ORAL EVERY 4 HOURS
Qty: 150 TABLET | Refills: 0 | Status: SHIPPED | OUTPATIENT
Start: 2022-03-24 | End: 2022-03-31

## 2022-03-24 RX ORDER — ACETAMINOPHEN AND CODEINE PHOSPHATE 120; 12 MG/5ML; MG/5ML
0.35 SOLUTION ORAL DAILY
Qty: 84 TABLET | Refills: 3 | Status: SHIPPED | OUTPATIENT
Start: 2022-03-24 | End: 2022-03-31

## 2022-03-31 ENCOUNTER — MYC MEDICAL ADVICE (OUTPATIENT)
Dept: FAMILY MEDICINE | Facility: CLINIC | Age: 42
End: 2022-03-31
Payer: COMMERCIAL

## 2022-03-31 DIAGNOSIS — G89.4 CHRONIC PAIN SYNDROME: ICD-10-CM

## 2022-03-31 DIAGNOSIS — Z30.41 ENCOUNTER FOR SURVEILLANCE OF CONTRACEPTIVE PILLS: ICD-10-CM

## 2022-03-31 DIAGNOSIS — F32.9 MAJOR DEPRESSION, CHRONIC: ICD-10-CM

## 2022-03-31 RX ORDER — OXYCODONE HYDROCHLORIDE 10 MG/1
10 TABLET ORAL EVERY 4 HOURS
Qty: 150 TABLET | Refills: 0 | Status: SHIPPED | OUTPATIENT
Start: 2022-03-31 | End: 2022-05-05

## 2022-03-31 RX ORDER — OXYCODONE HCL 10 MG/1
10 TABLET, FILM COATED, EXTENDED RELEASE ORAL DAILY
Qty: 30 TABLET | Refills: 0 | Status: SHIPPED | OUTPATIENT
Start: 2022-03-31 | End: 2022-05-05

## 2022-03-31 RX ORDER — DULOXETIN HYDROCHLORIDE 30 MG/1
30 CAPSULE, DELAYED RELEASE ORAL DAILY
Qty: 90 CAPSULE | Refills: 3 | Status: SHIPPED | OUTPATIENT
Start: 2022-03-31

## 2022-03-31 RX ORDER — ACETAMINOPHEN AND CODEINE PHOSPHATE 120; 12 MG/5ML; MG/5ML
0.35 SOLUTION ORAL DAILY
Qty: 84 TABLET | Refills: 3 | Status: SHIPPED | OUTPATIENT
Start: 2022-03-31 | End: 2023-01-13

## 2022-03-31 RX ORDER — DICLOFENAC POTASSIUM 50 MG/1
50 TABLET, FILM COATED ORAL 3 TIMES DAILY PRN
Qty: 60 TABLET | Refills: 0 | Status: SHIPPED | OUTPATIENT
Start: 2022-03-31 | End: 2022-06-20

## 2022-04-20 ENCOUNTER — VIRTUAL VISIT (OUTPATIENT)
Dept: FAMILY MEDICINE | Facility: CLINIC | Age: 42
End: 2022-04-20
Payer: COMMERCIAL

## 2022-04-20 DIAGNOSIS — G89.4 CHRONIC PAIN SYNDROME: ICD-10-CM

## 2022-04-20 DIAGNOSIS — R68.84 CHRONIC JAW PAIN: ICD-10-CM

## 2022-04-20 DIAGNOSIS — M54.9 BACK PAIN, UNSPECIFIED BACK LOCATION, UNSPECIFIED BACK PAIN LATERALITY, UNSPECIFIED CHRONICITY: ICD-10-CM

## 2022-04-20 DIAGNOSIS — M35.3 POLYMYALGIA (H): Primary | ICD-10-CM

## 2022-04-20 DIAGNOSIS — G89.29 CHRONIC JAW PAIN: ICD-10-CM

## 2022-04-20 PROBLEM — E88.89 KETOSIS (H): Status: RESOLVED | Noted: 2020-08-08 | Resolved: 2022-04-20

## 2022-04-20 PROCEDURE — 99214 OFFICE O/P EST MOD 30 MIN: CPT | Mod: 95 | Performed by: FAMILY MEDICINE

## 2022-04-20 RX ORDER — OXYCODONE HYDROCHLORIDE 15 MG/1
15 TABLET, FILM COATED, EXTENDED RELEASE ORAL EVERY 12 HOURS
Refills: 0 | Status: CANCELLED | OUTPATIENT
Start: 2022-04-20

## 2022-04-20 RX ORDER — OXYCODONE HYDROCHLORIDE 15 MG/1
15 TABLET, FILM COATED, EXTENDED RELEASE ORAL EVERY 24 HOURS
Qty: 30 TABLET | Refills: 0 | Status: SHIPPED | OUTPATIENT
Start: 2022-04-20 | End: 2022-04-21

## 2022-04-20 RX ORDER — OXYCODONE HYDROCHLORIDE 15 MG/1
15 TABLET ORAL EVERY 4 HOURS
Qty: 150 TABLET | Refills: 0 | Status: SHIPPED | OUTPATIENT
Start: 2022-04-20 | End: 2022-04-21

## 2022-04-20 ASSESSMENT — PATIENT HEALTH QUESTIONNAIRE - PHQ9: SUM OF ALL RESPONSES TO PHQ QUESTIONS 1-9: 7
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
## 2022-04-21 RX ORDER — OXYCODONE HYDROCHLORIDE 15 MG/1
15 TABLET, FILM COATED, EXTENDED RELEASE ORAL EVERY 24 HOURS
Qty: 30 TABLET | Refills: 0 | Status: SHIPPED | OUTPATIENT
Start: 2022-04-21 | End: 2022-05-06

## 2022-04-21 RX ORDER — OXYCODONE HYDROCHLORIDE 15 MG/1
15 TABLET ORAL EVERY 4 HOURS
Qty: 150 TABLET | Refills: 0 | Status: SHIPPED | OUTPATIENT
Start: 2022-04-21 | End: 2022-05-06

## 2022-05-05 ENCOUNTER — MYC REFILL (OUTPATIENT)
Dept: FAMILY MEDICINE | Facility: CLINIC | Age: 42
End: 2022-05-05
Payer: COMMERCIAL

## 2022-05-05 DIAGNOSIS — G89.4 CHRONIC PAIN SYNDROME: ICD-10-CM

## 2022-05-05 NOTE — TELEPHONE ENCOUNTER
Last clinic visit: 4/20/2022    Clinic visit frequency required: Q 3 months    Next clinic visit: N/A    Controlled substance agreement on file: Yes:  Date 5/14/2021.    Urine Drug Screen on file:  Yes- 5/14/2021    Pending Prescriptions:                       Disp   Refills    oxyCODONE IR (ROXICODONE) 10 MG tablet    150 ta*0            Sig: Take 1 tablet (10 mg) by mouth every 4 hours

## 2022-05-05 NOTE — TELEPHONE ENCOUNTER
Last clinic visit: 4/20/2022    Clinic visit frequency required: Q 3 months    Next clinic visit: N/A    Controlled substance agreement on file: Yes:  Date 5/14/2021.    Urine Drug Screen on file:  Yes- 5/14/2021    Pending Prescriptions:                       Disp   Refills    oxyCODONE (OXYCONTIN) 10 MG 12 hr tablet  30 tab*0            Sig: Take 1 tablet (10 mg) by mouth daily

## 2022-05-06 RX ORDER — OXYCODONE HCL 10 MG/1
10 TABLET, FILM COATED, EXTENDED RELEASE ORAL DAILY
Qty: 30 TABLET | Refills: 0 | Status: SHIPPED | OUTPATIENT
Start: 2022-05-06 | End: 2022-06-05

## 2022-05-06 RX ORDER — OXYCODONE HYDROCHLORIDE 10 MG/1
10 TABLET ORAL EVERY 4 HOURS
Qty: 150 TABLET | Refills: 0 | Status: SHIPPED | OUTPATIENT
Start: 2022-05-06 | End: 2022-05-30

## 2022-05-23 ASSESSMENT — ANXIETY QUESTIONNAIRES
GAD7 TOTAL SCORE: 1
1. FEELING NERVOUS, ANXIOUS, OR ON EDGE: SEVERAL DAYS
GAD7 TOTAL SCORE: 1
5. BEING SO RESTLESS THAT IT IS HARD TO SIT STILL: NOT AT ALL
8. IF YOU CHECKED OFF ANY PROBLEMS, HOW DIFFICULT HAVE THESE MADE IT FOR YOU TO DO YOUR WORK, TAKE CARE OF THINGS AT HOME, OR GET ALONG WITH OTHER PEOPLE?: SOMEWHAT DIFFICULT
GAD7 TOTAL SCORE: 1
6. BECOMING EASILY ANNOYED OR IRRITABLE: NOT AT ALL
7. FEELING AFRAID AS IF SOMETHING AWFUL MIGHT HAPPEN: NOT AT ALL
3. WORRYING TOO MUCH ABOUT DIFFERENT THINGS: NOT AT ALL
7. FEELING AFRAID AS IF SOMETHING AWFUL MIGHT HAPPEN: NOT AT ALL
4. TROUBLE RELAXING: NOT AT ALL
2. NOT BEING ABLE TO STOP OR CONTROL WORRYING: NOT AT ALL

## 2022-05-25 ENCOUNTER — VIRTUAL VISIT (OUTPATIENT)
Dept: FAMILY MEDICINE | Facility: CLINIC | Age: 42
End: 2022-05-25
Payer: COMMERCIAL

## 2022-05-25 DIAGNOSIS — M54.42 ACUTE LOW BACK PAIN WITH LEFT-SIDED SCIATICA, UNSPECIFIED BACK PAIN LATERALITY: Primary | ICD-10-CM

## 2022-05-25 PROCEDURE — 99207 PR NO CHARGE LOS: CPT | Performed by: FAMILY MEDICINE

## 2022-05-25 NOTE — LETTER
May 25, 2022      Lizzy Tom  [address]  [place] [address]        To Whom It May Concern:    Lizzy Tom  was seen on 5/25/22. I am recommending that she take a 2 week leave of absence from work to treat a flare-up of chronic lower back pain and lumbar radiculopathy.  Please excuse her from work until 6/8/22.        Sincerely,        Oma Fuentes MD
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
## 2022-05-27 ENCOUNTER — MYC MEDICAL ADVICE (OUTPATIENT)
Dept: FAMILY MEDICINE | Facility: CLINIC | Age: 42
End: 2022-05-27
Payer: COMMERCIAL

## 2022-05-27 NOTE — TELEPHONE ENCOUNTER
Chief Complaint   Patient presents with     Follow Up     Urgent care   Incoming medical message. Patient is requesting below: Please advise to medication question about her pain medication  1. Handicap sticker request  2. I know the upping my dose to 15mg every hour was way too much and caused problems so I was wondering if it's OK to just take 1 extra of my pills a day until the steroids kick in. So instead of taking 1 pill every 4 hours I could take 1 every 3 hours. Then my dose isn't going up nearly as much as it did but would still give me better pain coverage     Brenna River RN on 5/27/2022 at 9:23 AM

## 2022-05-30 ENCOUNTER — MYC REFILL (OUTPATIENT)
Dept: FAMILY MEDICINE | Facility: CLINIC | Age: 42
End: 2022-05-30
Payer: COMMERCIAL

## 2022-05-30 DIAGNOSIS — G89.4 CHRONIC PAIN SYNDROME: ICD-10-CM

## 2022-05-31 RX ORDER — OXYCODONE HYDROCHLORIDE 10 MG/1
10 TABLET ORAL EVERY 4 HOURS
Qty: 150 TABLET | Refills: 0 | Status: SHIPPED | OUTPATIENT
Start: 2022-05-31 | End: 2022-06-20

## 2022-05-31 NOTE — TELEPHONE ENCOUNTER
Routing refill request to provider for review/approval because:  Drug not on the [facility] refill protocol     Last Written Prescription Date:  5/6/22  Last Fill Quantity: 150,  # refills: 0   Last office visit provider:  5/25/22     Requested Prescriptions   Pending Prescriptions Disp Refills     oxyCODONE IR (ROXICODONE) 10 MG tablet 150 tablet 0     Sig: Take 1 tablet (10 mg) by mouth every 4 hours .For management of chronic back and neck pain       There is no refill protocol information for this order          Anayeli Serrano RN 05/31/22 10:24 AM

## 2022-06-05 ENCOUNTER — MYC MEDICAL ADVICE (OUTPATIENT)
Dept: FAMILY MEDICINE | Facility: CLINIC | Age: 42
End: 2022-06-05
Payer: COMMERCIAL

## 2022-06-05 ENCOUNTER — MYC REFILL (OUTPATIENT)
Dept: FAMILY MEDICINE | Facility: CLINIC | Age: 42
End: 2022-06-05
Payer: COMMERCIAL

## 2022-06-05 DIAGNOSIS — G89.4 CHRONIC PAIN SYNDROME: ICD-10-CM

## 2022-06-06 RX ORDER — OXYCODONE HCL 10 MG/1
10 TABLET, FILM COATED, EXTENDED RELEASE ORAL DAILY
Qty: 30 TABLET | Refills: 0 | Status: SHIPPED | OUTPATIENT
Start: 2022-06-06 | End: 2022-06-29

## 2022-06-06 NOTE — TELEPHONE ENCOUNTER
"Please see patients medical message. She is requesting \" I put in my refill request for my extended release 10mg pills but was wondering if I could go back up to the 15mg extended release for the month\".    Please advise to patients increase of her medication.    Brenna River RN on 6/6/2022 at 8:23 AM    "

## 2022-06-06 NOTE — TELEPHONE ENCOUNTER
Routing patient's message to PCP to advise on medication dosage change.    Cristiane Uribe RN on 6/6/2022 at 2:00 PM

## 2022-06-20 ENCOUNTER — MYC MEDICAL ADVICE (OUTPATIENT)
Dept: FAMILY MEDICINE | Facility: CLINIC | Age: 42
End: 2022-06-20
Payer: COMMERCIAL

## 2022-06-20 DIAGNOSIS — G89.4 CHRONIC PAIN SYNDROME: ICD-10-CM

## 2022-06-20 RX ORDER — OXYCODONE HYDROCHLORIDE 10 MG/1
10 TABLET ORAL EVERY 4 HOURS
Qty: 150 TABLET | Refills: 0 | Status: SHIPPED | OUTPATIENT
Start: 2022-06-20 | End: 2022-07-11

## 2022-06-20 RX ORDER — DICLOFENAC POTASSIUM 50 MG/1
50 TABLET, FILM COATED ORAL 3 TIMES DAILY PRN
Qty: 60 TABLET | Refills: 1 | Status: SHIPPED | OUTPATIENT
Start: 2022-06-20

## 2022-06-29 ENCOUNTER — MYC MEDICAL ADVICE (OUTPATIENT)
Dept: FAMILY MEDICINE | Facility: CLINIC | Age: 42
End: 2022-06-29

## 2022-06-29 DIAGNOSIS — G89.4 CHRONIC PAIN SYNDROME: ICD-10-CM

## 2022-06-29 RX ORDER — OXYCODONE HCL 10 MG/1
10 TABLET, FILM COATED, EXTENDED RELEASE ORAL EVERY 24 HOURS
Qty: 30 TABLET | Refills: 0 | Status: SHIPPED | OUTPATIENT
Start: 2022-06-29 | End: 2022-07-26

## 2022-06-29 NOTE — TELEPHONE ENCOUNTER
Last clinic visit: 5/25/2022    Clinic visit frequency required: Q 3 months    Next clinic visit: 7/8/2022    Controlled substance agreement on file: Yes:  Date 5/14/2021.    Urine Drug Screen on file:  Yes: 5/14/2021    Pending Prescriptions:                       Disp   Refills    oxyCODONE (OXYCONTIN) 10 MG 12 hr tablet  30 tab*0            Sig: Take 1 tablet (10 mg) by mouth every 24 hours for           30 days .For management of chronic back and neck           pain

## 2022-07-07 ASSESSMENT — ANXIETY QUESTIONNAIRES
5. BEING SO RESTLESS THAT IT IS HARD TO SIT STILL: NOT AT ALL
1. FEELING NERVOUS, ANXIOUS, OR ON EDGE: NOT AT ALL
6. BECOMING EASILY ANNOYED OR IRRITABLE: NOT AT ALL
3. WORRYING TOO MUCH ABOUT DIFFERENT THINGS: NOT AT ALL
7. FEELING AFRAID AS IF SOMETHING AWFUL MIGHT HAPPEN: NOT AT ALL
8. IF YOU CHECKED OFF ANY PROBLEMS, HOW DIFFICULT HAVE THESE MADE IT FOR YOU TO DO YOUR WORK, TAKE CARE OF THINGS AT HOME, OR GET ALONG WITH OTHER PEOPLE?: NOT DIFFICULT AT ALL
GAD7 TOTAL SCORE: 0
GAD7 TOTAL SCORE: 0
4. TROUBLE RELAXING: NOT AT ALL
2. NOT BEING ABLE TO STOP OR CONTROL WORRYING: NOT AT ALL
GAD7 TOTAL SCORE: 0
7. FEELING AFRAID AS IF SOMETHING AWFUL MIGHT HAPPEN: NOT AT ALL

## 2022-07-07 ASSESSMENT — PATIENT HEALTH QUESTIONNAIRE - PHQ9
SUM OF ALL RESPONSES TO PHQ QUESTIONS 1-9: 3
10. IF YOU CHECKED OFF ANY PROBLEMS, HOW DIFFICULT HAVE THESE PROBLEMS MADE IT FOR YOU TO DO YOUR WORK, TAKE CARE OF THINGS AT HOME, OR GET ALONG WITH OTHER PEOPLE: NOT DIFFICULT AT ALL
SUM OF ALL RESPONSES TO PHQ QUESTIONS 1-9: 3

## 2022-07-08 ENCOUNTER — VIRTUAL VISIT (OUTPATIENT)
Dept: FAMILY MEDICINE | Facility: CLINIC | Age: 42
End: 2022-07-08
Payer: COMMERCIAL

## 2022-07-08 DIAGNOSIS — G89.29 CHRONIC LOW BACK PAIN WITH SCIATICA, SCIATICA LATERALITY UNSPECIFIED, UNSPECIFIED BACK PAIN LATERALITY: Primary | ICD-10-CM

## 2022-07-08 DIAGNOSIS — M54.40 CHRONIC LOW BACK PAIN WITH SCIATICA, SCIATICA LATERALITY UNSPECIFIED, UNSPECIFIED BACK PAIN LATERALITY: Primary | ICD-10-CM

## 2022-07-08 PROCEDURE — 99213 OFFICE O/P EST LOW 20 MIN: CPT | Mod: 95 | Performed by: FAMILY MEDICINE

## 2022-07-08 RX ORDER — POTASSIUM CHLORIDE 1500 MG/1
20 TABLET, EXTENDED RELEASE ORAL
COMMUNITY
Start: 2022-06-24

## 2022-07-08 RX ORDER — CYCLOBENZAPRINE HCL 10 MG
TABLET ORAL
COMMUNITY
Start: 2022-05-26

## 2022-07-08 ASSESSMENT — ANXIETY QUESTIONNAIRES: GAD7 TOTAL SCORE: 0

## 2022-07-08 ASSESSMENT — PATIENT HEALTH QUESTIONNAIRE - PHQ9
SUM OF ALL RESPONSES TO PHQ QUESTIONS 1-9: 3
10. IF YOU CHECKED OFF ANY PROBLEMS, HOW DIFFICULT HAVE THESE PROBLEMS MADE IT FOR YOU TO DO YOUR WORK, TAKE CARE OF THINGS AT HOME, OR GET ALONG WITH OTHER PEOPLE: NOT DIFFICULT AT ALL

## 2022-07-08 NOTE — PROGRESS NOTES
Lizzy is a 42 year old who is being evaluated via a billable telephone visit.      What phone number would you like to be contacted at? [phone]  How would you like to obtain your AVS? Renetta    Assessment & Plan     Chronic low back pain with sciatica, sciatica laterality unspecified, unspecified back pain laterality  Completed application for handicap parking sticker.  She meets criteria due to chronic lower back pain and lumbar radiculopathy.  Because of the severity of her symptoms she is not able to walk more than 50 feet without having to stop and rest.  She will continue current treatment plan for managing her chronic lower back pain.  Again encouraged her to resume efforts to find a pain management specialist closer to her home that she can see.               Tobacco Cessation:   reports that she has been smoking cigarettes. She has a 8.50 pack-year smoking history. She has never used smokeless tobacco.          No follow-ups on file.    Oma Fuentes MD  [facility]   Lizzy is a 42 year old, presenting for the following health issues:  Form (Would like disability parking certificate)      History of Present Illness       Back Pain:  She presents for follow up of back pain. Patient's back pain is a chronic problem.  Location of back pain:  Right lower back and left lower back  Description of back pain: gnawing and shooting  Back pain spreads: right buttocks and right thigh    Since patient first noticed back pain, pain is: gradually improving  Does back pain interfere with her job:  Yes      She eats 2-3 servings of fruits and vegetables daily.She consumes 2 sweetened beverage(s) daily.She exercises with enough effort to increase her heart rate 9 or less minutes per day.  She exercises with enough effort to increase her heart rate 3 or less days per week.   She is taking medications regularly.    Today's PHQ-9         PHQ-9 Total Score: 3    PHQ-9 Q9 Thoughts of  better off dead/self-harm past 2 weeks :   Not at all    How difficult have these problems made it for you to do your work, take care of things at home, or get along with other people: Not difficult at all  Today's ARTIS-7 Score: 0     She is evaluated today via telephone encounter to discuss getting a handicap parking sticker.  She has history of chronic lower back pain and lumbar radiculopathy.  Lately has been struggling with worsening back pain.  She has difficulty walking longer distances.  Has to stop and rest after about 50 feet.  Generally uses shopping cart to support herself and relieve pain when she is shopping.  Has had falls in the past when walking into stores from the parking lot.  Would like handicap parking sticker so that she is able to park closer to the entrance of the store.  Reviewed medications and allergies.  Chart updated.  No other concerns        Review of Systems         Objective           Vitals:  No vitals were obtained today due to virtual visit.    Physical Exam   healthy, alert and no distress  PSYCH: Alert and oriented times 3; coherent speech, normal   rate and volume, able to articulate logical thoughts, able   to abstract reason, no tangential thoughts, no hallucinations   or delusions  Her affect is normal  RESP: No cough, no audible wheezing, able to talk in full sentences  Remainder of exam unable to be completed due to telephone visits                Phone call duration: 7 minutes    .  ..

## 2022-07-10 ENCOUNTER — MYC MEDICAL ADVICE (OUTPATIENT)
Dept: FAMILY MEDICINE | Facility: CLINIC | Age: 42
End: 2022-07-10

## 2022-07-11 ENCOUNTER — MYC REFILL (OUTPATIENT)
Dept: FAMILY MEDICINE | Facility: CLINIC | Age: 42
End: 2022-07-11

## 2022-07-11 DIAGNOSIS — G89.4 CHRONIC PAIN SYNDROME: ICD-10-CM

## 2022-07-12 ENCOUNTER — MYC MEDICAL ADVICE (OUTPATIENT)
Dept: FAMILY MEDICINE | Facility: CLINIC | Age: 42
End: 2022-07-12

## 2022-07-12 DIAGNOSIS — G89.4 CHRONIC PAIN SYNDROME: ICD-10-CM

## 2022-07-12 RX ORDER — OXYCODONE HYDROCHLORIDE 10 MG/1
10 TABLET ORAL EVERY 4 HOURS
Qty: 150 TABLET | Refills: 0 | Status: SHIPPED | OUTPATIENT
Start: 2022-07-20 | End: 2022-07-14

## 2022-07-14 RX ORDER — OXYCODONE HYDROCHLORIDE 10 MG/1
10 TABLET ORAL EVERY 4 HOURS
Qty: 150 TABLET | Refills: 0 | Status: SHIPPED | OUTPATIENT
Start: 2022-07-15 | End: 2022-07-28

## 2022-07-14 NOTE — TELEPHONE ENCOUNTER
New Rx sent with ok to refill on 7/15/22    Please call pharmacy and cancel the other prescription that was sent to be filled on 7/20/22

## 2022-07-14 NOTE — TELEPHONE ENCOUNTER
Call placed to pharmacy. They will dispense the Oxycodone on 7/15/22. They also deleted the Oxycodone prescription that was due on 7/20/2022.

## 2022-07-26 ENCOUNTER — MYC REFILL (OUTPATIENT)
Dept: FAMILY MEDICINE | Facility: CLINIC | Age: 42
End: 2022-07-26

## 2022-07-26 DIAGNOSIS — G89.4 CHRONIC PAIN SYNDROME: ICD-10-CM

## 2022-07-26 RX ORDER — OXYCODONE HCL 10 MG/1
10 TABLET, FILM COATED, EXTENDED RELEASE ORAL EVERY 24 HOURS
Qty: 30 TABLET | Refills: 0 | Status: SHIPPED | OUTPATIENT
Start: 2022-07-26 | End: 2022-08-22

## 2022-07-26 NOTE — TELEPHONE ENCOUNTER
Routing refill request to provider for review/approval because:  Drug not on the [facility] refill protocol   Controlled substance    Last Written Prescription Date:  06/29/2022  Last Fill Quantity: 30 tablets,  # refills: 0   Last office visit provider: Dr. Fuentes on 07/08/2022     Requested Prescriptions   Pending Prescriptions Disp Refills     oxyCODONE (OXYCONTIN) 10 MG 12 hr tablet 30 tablet 0     Sig: Take 1 tablet (10 mg) by mouth every 24 hours .For management of chronic back and neck pain       There is no refill protocol information for this order          Cristiane Uribe RN 07/26/22 2:00 PM   See HPI

## 2022-07-28 ENCOUNTER — MYC REFILL (OUTPATIENT)
Dept: FAMILY MEDICINE | Facility: CLINIC | Age: 42
End: 2022-07-28

## 2022-07-28 DIAGNOSIS — G89.4 CHRONIC PAIN SYNDROME: ICD-10-CM

## 2022-07-29 ENCOUNTER — TELEPHONE (OUTPATIENT)
Dept: FAMILY MEDICINE | Facility: CLINIC | Age: 42
End: 2022-07-29

## 2022-07-29 DIAGNOSIS — G89.4 CHRONIC PAIN SYNDROME: ICD-10-CM

## 2022-07-29 RX ORDER — OXYCODONE HYDROCHLORIDE 10 MG/1
10 TABLET ORAL EVERY 4 HOURS
Qty: 150 TABLET | Refills: 0 | Status: SHIPPED | OUTPATIENT
Start: 2022-07-29 | End: 2022-08-23

## 2022-07-29 RX ORDER — OXYCODONE HYDROCHLORIDE 10 MG/1
10 TABLET ORAL EVERY 4 HOURS
Qty: 42 TABLET | Refills: 0 | Status: SHIPPED | OUTPATIENT
Start: 2022-07-29 | End: 2022-07-29

## 2022-07-29 NOTE — TELEPHONE ENCOUNTER
Prescription sent to [facility] pharmacy.  Please cancel prescription that was sent yesterday to [facility].  Prescription sent for full month
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
## 2022-08-22 ENCOUNTER — MYC REFILL (OUTPATIENT)
Dept: FAMILY MEDICINE | Facility: CLINIC | Age: 42
End: 2022-08-22

## 2022-08-22 DIAGNOSIS — G89.4 CHRONIC PAIN SYNDROME: ICD-10-CM

## 2022-08-22 RX ORDER — OXYCODONE HCL 10 MG/1
10 TABLET, FILM COATED, EXTENDED RELEASE ORAL EVERY 24 HOURS
Qty: 30 TABLET | Refills: 0 | Status: SHIPPED | OUTPATIENT
Start: 2022-08-22 | End: 2022-09-16

## 2022-08-23 ENCOUNTER — MYC REFILL (OUTPATIENT)
Dept: FAMILY MEDICINE | Facility: CLINIC | Age: 42
End: 2022-08-23

## 2022-08-23 DIAGNOSIS — G89.4 CHRONIC PAIN SYNDROME: ICD-10-CM

## 2022-08-23 NOTE — TELEPHONE ENCOUNTER
Routing refill request to provider for review/approval because:  Drug not on the [facility] [place]   Controlled substance    Last Written Prescription Date:  07/29/2022  Last Fill Quantity: 150 tablets,  # refills: 0   Last office visit provider:  Dr. Fuentes on 07/08/2022     Requested Prescriptions   Pending Prescriptions Disp Refills     oxyCODONE IR (ROXICODONE) 10 MG tablet 150 tablet 0     Sig: Take 1 tablet (10 mg) by mouth every 4 hours .For management  of chronic back and neck pain       There is no refill protocol information for this order          Cristiane Uribe RN 08/23/22 9:09 AM

## 2022-08-24 RX ORDER — OXYCODONE HYDROCHLORIDE 10 MG/1
10 TABLET ORAL EVERY 4 HOURS
Qty: 150 TABLET | Refills: 0 | Status: SHIPPED | OUTPATIENT
Start: 2022-08-24 | End: 2022-09-17

## 2022-09-16 ENCOUNTER — MYC REFILL (OUTPATIENT)
Dept: FAMILY MEDICINE | Facility: CLINIC | Age: 42
End: 2022-09-16

## 2022-09-16 ENCOUNTER — MYC MEDICAL ADVICE (OUTPATIENT)
Dept: FAMILY MEDICINE | Facility: CLINIC | Age: 42
End: 2022-09-16

## 2022-09-16 DIAGNOSIS — G89.4 CHRONIC PAIN SYNDROME: ICD-10-CM

## 2022-09-16 RX ORDER — OXYCODONE HCL 10 MG/1
10 TABLET, FILM COATED, EXTENDED RELEASE ORAL EVERY 24 HOURS
Qty: 30 TABLET | Refills: 0 | Status: SHIPPED | OUTPATIENT
Start: 2022-09-20 | End: 2022-10-18

## 2022-09-16 NOTE — TELEPHONE ENCOUNTER
Please let pharmacy know that it is okay to fill patient's OxyContin 10 mg tablets tomorrow on 9/17/2022 as she will be going out of town on Monday

## 2022-09-17 ENCOUNTER — MYC REFILL (OUTPATIENT)
Dept: FAMILY MEDICINE | Facility: CLINIC | Age: 42
End: 2022-09-17

## 2022-09-17 ENCOUNTER — HEALTH MAINTENANCE LETTER (OUTPATIENT)
Age: 42
End: 2022-09-17

## 2022-09-17 DIAGNOSIS — G89.4 CHRONIC PAIN SYNDROME: ICD-10-CM

## 2022-09-19 RX ORDER — OXYCODONE HYDROCHLORIDE 10 MG/1
10 TABLET ORAL EVERY 4 HOURS
Qty: 150 TABLET | Refills: 0 | Status: SHIPPED | OUTPATIENT
Start: 2022-09-19 | End: 2022-10-20

## 2022-10-13 ENCOUNTER — MYC REFILL (OUTPATIENT)
Dept: FAMILY MEDICINE | Facility: CLINIC | Age: 42
End: 2022-10-13

## 2022-10-13 DIAGNOSIS — G89.4 CHRONIC PAIN SYNDROME: ICD-10-CM

## 2022-10-14 ENCOUNTER — MYC MEDICAL ADVICE (OUTPATIENT)
Dept: FAMILY MEDICINE | Facility: CLINIC | Age: 42
End: 2022-10-14

## 2022-10-14 DIAGNOSIS — G89.4 CHRONIC PAIN SYNDROME: ICD-10-CM

## 2022-10-14 RX ORDER — OXYCODONE HCL 10 MG/1
10 TABLET, FILM COATED, EXTENDED RELEASE ORAL EVERY 24 HOURS
Qty: 30 TABLET | Refills: 0 | OUTPATIENT
Start: 2022-10-14

## 2022-10-18 RX ORDER — OXYCODONE HCL 10 MG/1
10 TABLET, FILM COATED, EXTENDED RELEASE ORAL EVERY 24 HOURS
Qty: 30 TABLET | Refills: 0 | Status: SHIPPED | OUTPATIENT
Start: 2022-10-18 | End: 2022-11-12

## 2022-10-19 ENCOUNTER — MYC REFILL (OUTPATIENT)
Dept: FAMILY MEDICINE | Facility: CLINIC | Age: 42
End: 2022-10-19

## 2022-10-19 DIAGNOSIS — G89.4 CHRONIC PAIN SYNDROME: ICD-10-CM

## 2022-10-20 DIAGNOSIS — G89.4 CHRONIC PAIN SYNDROME: ICD-10-CM

## 2022-10-20 RX ORDER — OXYCODONE HYDROCHLORIDE 10 MG/1
10 TABLET ORAL EVERY 4 HOURS
Qty: 150 TABLET | Refills: 0 | OUTPATIENT
Start: 2022-10-20

## 2022-10-20 RX ORDER — OXYCODONE HYDROCHLORIDE 10 MG/1
10 TABLET ORAL EVERY 4 HOURS
Qty: 150 TABLET | Refills: 0 | Status: SHIPPED | OUTPATIENT
Start: 2022-10-20 | End: 2022-11-15

## 2022-10-20 NOTE — TELEPHONE ENCOUNTER
Patient calling to get a medication refill on medications attached.    Looking before 11 AM Patient knows provider is out till Monday looking to get covering provider to help.     Patient was very nice about asking and understood provider is out     Controlled Substance Refill Request for     oxyCODONE IR (ROXICODONE) 10 MG tablet    Last refill: 9/19/2022    Last clinic visit: VV 7/8/2022    Clinic visit frequency required:   Next appt: 10/27/2022

## 2022-11-08 ENCOUNTER — MYC MEDICAL ADVICE (OUTPATIENT)
Dept: FAMILY MEDICINE | Facility: CLINIC | Age: 42
End: 2022-11-08

## 2022-11-08 DIAGNOSIS — K04.7 DENTAL INFECTION: Primary | ICD-10-CM

## 2022-11-09 RX ORDER — OXYCODONE HYDROCHLORIDE 5 MG/1
TABLET ORAL
Qty: 35 TABLET | Refills: 0 | Status: SHIPPED | OUTPATIENT
Start: 2022-11-09 | End: 2022-12-12

## 2022-11-12 ENCOUNTER — MYC REFILL (OUTPATIENT)
Dept: FAMILY MEDICINE | Facility: CLINIC | Age: 42
End: 2022-11-12

## 2022-11-12 DIAGNOSIS — G89.4 CHRONIC PAIN SYNDROME: ICD-10-CM

## 2022-11-13 ENCOUNTER — MYC MEDICAL ADVICE (OUTPATIENT)
Dept: FAMILY MEDICINE | Facility: CLINIC | Age: 42
End: 2022-11-13

## 2022-11-13 DIAGNOSIS — G89.4 CHRONIC PAIN SYNDROME: ICD-10-CM

## 2022-11-13 RX ORDER — OXYCODONE HCL 10 MG/1
10 TABLET, FILM COATED, EXTENDED RELEASE ORAL EVERY 24 HOURS
Qty: 30 TABLET | Refills: 0 | Status: SHIPPED | OUTPATIENT
Start: 2022-11-17 | End: 2022-11-14

## 2022-11-14 RX ORDER — OXYCODONE HCL 10 MG/1
10 TABLET, FILM COATED, EXTENDED RELEASE ORAL EVERY 24 HOURS
Qty: 30 TABLET | Refills: 0 | Status: SHIPPED | OUTPATIENT
Start: 2022-11-17 | End: 2022-11-14

## 2022-11-14 RX ORDER — OXYCODONE HCL 10 MG/1
10 TABLET, FILM COATED, EXTENDED RELEASE ORAL EVERY 24 HOURS
Qty: 30 TABLET | Refills: 0 | Status: SHIPPED | OUTPATIENT
Start: 2022-11-14 | End: 2023-01-27

## 2022-11-14 NOTE — TELEPHONE ENCOUNTER
Sent patient MyChart message informing her I called the [facility] in [place] and authorized her early refill.

## 2022-11-14 NOTE — TELEPHONE ENCOUNTER
Pt requesting script be sent to [facility] in [place], [place]. She has one dose left.

## 2022-11-15 ENCOUNTER — MYC REFILL (OUTPATIENT)
Dept: FAMILY MEDICINE | Facility: CLINIC | Age: 42
End: 2022-11-15

## 2022-11-15 DIAGNOSIS — G89.4 CHRONIC PAIN SYNDROME: ICD-10-CM

## 2022-11-16 RX ORDER — OXYCODONE HYDROCHLORIDE 10 MG/1
10 TABLET ORAL EVERY 4 HOURS
Qty: 150 TABLET | Refills: 0 | Status: SHIPPED | OUTPATIENT
Start: 2022-11-16 | End: 2022-12-12

## 2022-12-12 ENCOUNTER — MYC MEDICAL ADVICE (OUTPATIENT)
Dept: FAMILY MEDICINE | Facility: CLINIC | Age: 42
End: 2022-12-12

## 2022-12-12 DIAGNOSIS — G89.4 CHRONIC PAIN SYNDROME: ICD-10-CM

## 2022-12-12 RX ORDER — OXYCODONE HYDROCHLORIDE 10 MG/1
10 TABLET ORAL EVERY 4 HOURS
Qty: 180 TABLET | Refills: 0 | Status: SHIPPED | OUTPATIENT
Start: 2022-12-12 | End: 2023-01-04

## 2023-01-04 ENCOUNTER — MYC MEDICAL ADVICE (OUTPATIENT)
Dept: FAMILY MEDICINE | Facility: CLINIC | Age: 43
End: 2023-01-04

## 2023-01-04 DIAGNOSIS — G89.4 CHRONIC PAIN SYNDROME: ICD-10-CM

## 2023-01-04 RX ORDER — OXYCODONE HYDROCHLORIDE 10 MG/1
10 TABLET ORAL EVERY 4 HOURS
Qty: 150 TABLET | Refills: 0 | Status: SHIPPED | OUTPATIENT
Start: 2023-01-04 | End: 2023-02-02

## 2023-01-12 DIAGNOSIS — Z30.41 ENCOUNTER FOR SURVEILLANCE OF CONTRACEPTIVE PILLS: ICD-10-CM

## 2023-01-13 RX ORDER — ACETAMINOPHEN AND CODEINE PHOSPHATE 120; 12 MG/5ML; MG/5ML
SOLUTION ORAL
Qty: 84 TABLET | Refills: 3 | Status: SHIPPED | OUTPATIENT
Start: 2023-01-13

## 2023-01-13 NOTE — TELEPHONE ENCOUNTER
"Routing refill request to provider for review/approval because:  Smoker warning  Early refill requested.    Last Written Prescription Date:  3/31/22  Last Fill Quantity: 84,  # refills: 3   Last office visit provider:  7/8/22     Requested Prescriptions   Pending Prescriptions Disp Refills     norethindrone (MICRONOR) 0.35 MG tablet [Pharmacy Med Name: NORETHINDRONE 0.35 MG TABLET] 84 tablet 3     Sig: TAKE 1 TABLET BY MOUTH EVERY DAY       Contraceptives Protocol Failed - 1/13/2023  9:49 AM        Failed - Patient is not a current smoker if age is 35 or older        Passed - Recent (12 mo) or future (30 days) visit within the authorizing provider's specialty     Patient has had an office visit with the authorizing provider or a provider within the authorizing providers department within the previous 12 mos or has a future within next 30 days. See \"Patient Info\" tab in inbasket, or \"Choose Columns\" in Meds & Orders section of the refill encounter.              Passed - Medication is active on med list        Passed - No active pregnancy on record        Passed - No positive pregnancy test in past 12 months             Naeem Richard RN 01/13/23 9:49 AM  "

## 2023-01-27 ENCOUNTER — MYC REFILL (OUTPATIENT)
Dept: FAMILY MEDICINE | Facility: CLINIC | Age: 43
End: 2023-01-27
Payer: COMMERCIAL

## 2023-01-27 DIAGNOSIS — G89.4 CHRONIC PAIN SYNDROME: ICD-10-CM

## 2023-01-27 RX ORDER — OXYCODONE HCL 10 MG/1
10 TABLET, FILM COATED, EXTENDED RELEASE ORAL EVERY 24 HOURS
Qty: 30 TABLET | Refills: 0 | Status: SHIPPED | OUTPATIENT
Start: 2023-01-27 | End: 2023-02-24

## 2023-01-27 NOTE — TELEPHONE ENCOUNTER
Routing refill request to provider for review/approval because:  Drug not on the [facility] refill protocol   Pending Prescriptions:                       Disp   Refills    oxyCODONE (OXYCONTIN) 10 MG 12 hr tablet  30 tab*0            Sig: Take 1 tablet (10 mg) by mouth every 24 hours           .For management of chronic back and neck pain    Last Written Prescription Date:  1/4/23  Last Fill Quantity: 30,  # refills: 0   Last office visitwith prescribing provider: Kristin 7/8/22   Future Office Visit:  None scheduled    YANCI RuizN, RN  [facility]

## 2023-02-02 ENCOUNTER — MYC MEDICAL ADVICE (OUTPATIENT)
Dept: FAMILY MEDICINE | Facility: CLINIC | Age: 43
End: 2023-02-02
Payer: COMMERCIAL

## 2023-02-02 DIAGNOSIS — G89.4 CHRONIC PAIN SYNDROME: ICD-10-CM

## 2023-02-02 RX ORDER — OXYCODONE HYDROCHLORIDE 10 MG/1
10 TABLET ORAL EVERY 4 HOURS
Qty: 150 TABLET | Refills: 0 | Status: SHIPPED | OUTPATIENT
Start: 2023-02-03 | End: 2023-02-27

## 2023-02-02 NOTE — TELEPHONE ENCOUNTER
Sent refill request to PCP and covering provider. Already pended. Shanti Stein RN on 2/2/2023 at 3:12 PM

## 2023-02-08 NOTE — TELEPHONE ENCOUNTER
Looks like PA was approved. See phone note from 8/2/19   <-- Click to add NO pertinent Past Medical History

## 2023-02-24 DIAGNOSIS — G89.4 CHRONIC PAIN SYNDROME: ICD-10-CM

## 2023-02-24 RX ORDER — OXYCODONE HCL 10 MG/1
10 TABLET, FILM COATED, EXTENDED RELEASE ORAL EVERY 24 HOURS
Qty: 30 TABLET | Refills: 0 | Status: SHIPPED | OUTPATIENT
Start: 2023-02-24 | End: 2023-03-17

## 2023-02-27 ENCOUNTER — MYC REFILL (OUTPATIENT)
Dept: FAMILY MEDICINE | Facility: CLINIC | Age: 43
End: 2023-02-27
Payer: COMMERCIAL

## 2023-02-27 ENCOUNTER — MYC MEDICAL ADVICE (OUTPATIENT)
Dept: FAMILY MEDICINE | Facility: CLINIC | Age: 43
End: 2023-02-27
Payer: COMMERCIAL

## 2023-02-27 DIAGNOSIS — G89.4 CHRONIC PAIN SYNDROME: ICD-10-CM

## 2023-02-27 NOTE — TELEPHONE ENCOUNTER
Pt is calling wanting to make sure  got her message and is hopeful to hear back sometime today if appropriate.

## 2023-02-28 ENCOUNTER — TELEPHONE (OUTPATIENT)
Dept: NURSING | Facility: CLINIC | Age: 43
End: 2023-02-28
Payer: COMMERCIAL

## 2023-02-28 ENCOUNTER — TELEPHONE (OUTPATIENT)
Dept: FAMILY MEDICINE | Facility: CLINIC | Age: 43
End: 2023-02-28
Payer: COMMERCIAL

## 2023-02-28 DIAGNOSIS — G89.4 CHRONIC PAIN SYNDROME: ICD-10-CM

## 2023-02-28 RX ORDER — OXYCODONE HYDROCHLORIDE 10 MG/1
10 TABLET ORAL EVERY 4 HOURS
Qty: 150 TABLET | Refills: 0 | Status: SHIPPED | OUTPATIENT
Start: 2023-02-28 | End: 2023-03-01

## 2023-02-28 NOTE — TELEPHONE ENCOUNTER
Pt is calling in regards to her refill prescription for oxycodone. The pharmacy is not able to fill because it is too soon for the refill. She received a prescription on 2/3 according to the pharmacy. They need a note from the prescribing MD on the new prescription sent to the pharmacy today that states it is ok to fill early.     Luci Benton RN on 2/28/2023 at 5:03 PM

## 2023-02-28 NOTE — TELEPHONE ENCOUNTER
Refill sent to pharmacy today for the Oxycodone, pharmacy unable to fill, too soon.  Per patient Dr Fuentes knows she took a couple extra while she had covid. Office needs to call pharmacy to give permission to fill early

## 2023-03-01 RX ORDER — OXYCODONE HYDROCHLORIDE 10 MG/1
10 TABLET ORAL EVERY 4 HOURS
Qty: 150 TABLET | Refills: 0 | Status: SHIPPED | OUTPATIENT
Start: 2023-03-01 | End: 2023-03-22

## 2023-03-17 ENCOUNTER — MYC REFILL (OUTPATIENT)
Dept: FAMILY MEDICINE | Facility: CLINIC | Age: 43
End: 2023-03-17
Payer: COMMERCIAL

## 2023-03-17 DIAGNOSIS — G89.4 CHRONIC PAIN SYNDROME: ICD-10-CM

## 2023-03-17 NOTE — TELEPHONE ENCOUNTER
Routing refill request to provider for review/approval because:  Drug not on the [facility] [place]     Last Written Prescription Date:  2/24/2023  Last Fill Quantity: 30,  # refills: 0   Last office visit provider:  7/8/2022     Requested Prescriptions   Pending Prescriptions Disp Refills     oxyCODONE (OXYCONTIN) 10 MG 12 hr tablet 30 tablet 0     Sig: Take 1 tablet (10 mg) by mouth every 24 hours .For management of chronic back and neck pain       There is no refill protocol information for this order          Lani Shelton RN 03/17/23 4:26 PM

## 2023-03-18 RX ORDER — OXYCODONE HCL 10 MG/1
10 TABLET, FILM COATED, EXTENDED RELEASE ORAL EVERY 24 HOURS
Qty: 30 TABLET | Refills: 0 | Status: SHIPPED | OUTPATIENT
Start: 2023-03-18 | End: 2023-04-16

## 2023-03-22 ENCOUNTER — MYC REFILL (OUTPATIENT)
Dept: FAMILY MEDICINE | Facility: CLINIC | Age: 43
End: 2023-03-22
Payer: COMMERCIAL

## 2023-03-22 DIAGNOSIS — G89.4 CHRONIC PAIN SYNDROME: ICD-10-CM

## 2023-03-22 RX ORDER — OXYCODONE HYDROCHLORIDE 10 MG/1
10 TABLET ORAL EVERY 4 HOURS
Qty: 150 TABLET | Refills: 0 | Status: SHIPPED | OUTPATIENT
Start: 2023-03-22 | End: 2023-04-16

## 2023-04-16 ENCOUNTER — MYC MEDICAL ADVICE (OUTPATIENT)
Dept: FAMILY MEDICINE | Facility: CLINIC | Age: 43
End: 2023-04-16
Payer: COMMERCIAL

## 2023-04-16 ENCOUNTER — MYC REFILL (OUTPATIENT)
Dept: FAMILY MEDICINE | Facility: CLINIC | Age: 43
End: 2023-04-16
Payer: COMMERCIAL

## 2023-04-16 DIAGNOSIS — G89.4 CHRONIC PAIN SYNDROME: ICD-10-CM

## 2023-04-17 RX ORDER — OXYCODONE HYDROCHLORIDE 10 MG/1
10 TABLET ORAL EVERY 4 HOURS
Qty: 150 TABLET | Refills: 0 | Status: SHIPPED | OUTPATIENT
Start: 2023-04-17 | End: 2023-05-15

## 2023-04-17 RX ORDER — OXYCODONE HCL 10 MG/1
10 TABLET, FILM COATED, EXTENDED RELEASE ORAL EVERY 24 HOURS
Qty: 30 TABLET | Refills: 0 | Status: SHIPPED | OUTPATIENT
Start: 2023-04-17 | End: 2023-05-15

## 2023-04-17 NOTE — TELEPHONE ENCOUNTER
Informed Claudia, pharmacist, of info. Claudia stated that they will pu tit through. Will let pt know via MyChart.    YANCI RuizN, RN  [facility]

## 2023-06-12 ENCOUNTER — MYC REFILL (OUTPATIENT)
Dept: FAMILY MEDICINE | Facility: CLINIC | Age: 43
End: 2023-06-12
Payer: COMMERCIAL

## 2023-06-12 DIAGNOSIS — G89.4 CHRONIC PAIN SYNDROME: ICD-10-CM

## 2023-06-13 RX ORDER — OXYCODONE HCL 10 MG/1
10 TABLET, FILM COATED, EXTENDED RELEASE ORAL EVERY 24 HOURS
Qty: 30 TABLET | Refills: 0 | Status: SHIPPED | OUTPATIENT
Start: 2023-06-13 | End: 2023-07-09

## 2023-06-13 RX ORDER — OXYCODONE HYDROCHLORIDE 10 MG/1
10 TABLET ORAL EVERY 4 HOURS
Qty: 150 TABLET | Refills: 0 | Status: SHIPPED | OUTPATIENT
Start: 2023-06-13 | End: 2023-07-09

## 2023-07-09 ENCOUNTER — MYC REFILL (OUTPATIENT)
Dept: FAMILY MEDICINE | Facility: CLINIC | Age: 43
End: 2023-07-09
Payer: COMMERCIAL

## 2023-07-09 DIAGNOSIS — G89.4 CHRONIC PAIN SYNDROME: ICD-10-CM

## 2023-07-10 RX ORDER — OXYCODONE HYDROCHLORIDE 10 MG/1
10 TABLET ORAL EVERY 4 HOURS
Qty: 150 TABLET | Refills: 0 | Status: SHIPPED | OUTPATIENT
Start: 2023-07-13 | End: 2023-07-11

## 2023-07-10 RX ORDER — OXYCODONE HCL 10 MG/1
10 TABLET, FILM COATED, EXTENDED RELEASE ORAL EVERY 24 HOURS
Qty: 30 TABLET | Refills: 0 | Status: SHIPPED | OUTPATIENT
Start: 2023-07-13 | End: 2023-07-11

## 2023-07-10 NOTE — TELEPHONE ENCOUNTER
Patient is calling again to check on the status of her medications I told patient it is pending with the provider.

## 2023-07-10 NOTE — TELEPHONE ENCOUNTER
oxycontin 10MG  Routing refill request to provider for review/approval because:  Drug not on the [facility] refill protocol     Last Written Prescription Date:  6/13/2023  Last Fill Quantity: 30,  # refills: 0   Last office visit provider:  7/8/2022       Oxycodone 10MG  Routing refill request to provider for review/approval because:  Drug not on the [facility] refill protocol     Last Written Prescription Date:  6/13/2023  Last Fill Quantity: 150,  # refills: 0   Last office visit provider:  7/8/2022     Requested Prescriptions   Pending Prescriptions Disp Refills     oxyCODONE (OXYCONTIN) 10 MG 12 hr tablet 30 tablet 0     Sig: Take 1 tablet (10 mg) by mouth every 24 hours .For management of chronic back and neck pain       There is no refill protocol information for this order        oxyCODONE IR (ROXICODONE) 10 MG tablet 150 tablet 0     Sig: Take 1 tablet (10 mg) by mouth every 4 hours Max of 5 per day .For management  of chronic back and neck pain       There is no refill protocol information for this order          Lani Shelton RN 07/09/23 10:15 PM

## 2023-07-11 ENCOUNTER — NURSE TRIAGE (OUTPATIENT)
Dept: NURSING | Facility: CLINIC | Age: 43
End: 2023-07-11
Payer: COMMERCIAL

## 2023-07-11 NOTE — TELEPHONE ENCOUNTER
Incoming call from patient. Relayed covering provider's notation below. Patient is hesitant with contacting OB for pain meds since PCP manages this.    States that Dr. Milton sent Rx for patient but not until 7/13 & patient is needing it today. Reiterated that patient should call OB provider to assist with this since PCP is not in clinic or another option would be for patient to go to UC if no response from OBGYN.    Jacy Valdez, RN, BSN  [facility]

## 2023-07-11 NOTE — TELEPHONE ENCOUNTER
Nurse Triage SBAR    Is this a 2nd Level Triage? YES, LICENSED PRACTITIONER REVIEW IS REQUIRED    Situation: Patient calling regarding controlled substance request.    Background: Patient reporting that prior request for pain medication was filled, but with a future date. Patient states she was allowed to take additional doses when she had COVID and she is now out of medication. Patient states she is going through withdrawal symptoms. Patient also stated she is 30 weeks pregnant.    Assessment: Controlled substance request.    Protocol Recommended Disposition:   Discuss With PCP And Callback By Nurse Today    Recommendation: Advised patient that messaging to be sent to PCP/Care team for disposition input.     Routed to provider    Does the patient meet one of the following criteria for ADS visit consideration? 16+ years old, with an [facility] PCP     TIP  Providers, please consider if this condition is appropriate for management at one of our Acute and Diagnostic Services sites.     If patient is a good candidate, please use dotphrase <dot>triageresponse and select Refer to ADS to document.     Reason for Disposition    Caller requesting a CONTROLLED substance prescription refill (e.g., narcotics, ADHD medicines)    Additional Information    Negative: New-onset or worsening symptoms, see that protocol (e.g., diarrhea, runny nose, sore throat)    Negative: Medicine question not related to refill or renewal    Negative: Caller (e.g., patient or pharmacist) requesting information about a new medicine    Negative: Caller requesting information unrelated to medicine    Negative: Prescription refill request for ESSENTIAL medicine (i.e., likelihood of harm to patient if not taken) and triager unable to refill per department policy    Negative: Prescription not at pharmacy and was prescribed by PCP recently  (Exception: triager has access to EMR and prescription is recorded there. Go to Home Care and confirm for pharmacy.)     Negative: Pharmacy calling with prescription questions and triager unable to answer question    Protocols used: MEDICATION REFILL AND RENEWAL CALL-A-OH    Naeem Richard RN, BSN, MSN  [facility] Triage 7:15 AM

## 2023-07-11 NOTE — TELEPHONE ENCOUNTER
Patient is calling again stating she is going through withdrawals and she is out of medication. Please advise and call patient if needed please and thank you.

## 2023-07-11 NOTE — TELEPHONE ENCOUNTER
Reviewed messages - chronic pain on opioids, pregnant, has not been seen by PCP since 7/8/22.    Honestly pt should be working with her OBGYN provider regarding chronic pain management during pregnancy - this either needs her OBGYN (most appropriate) or PCP to fill.    Dr Aranda   14

## 2023-07-11 NOTE — TELEPHONE ENCOUNTER
Patient is calling again checking on the status of her pain medication states she does not want to go to the er. Please advise and call patient ASAP.

## 2023-07-17 ENCOUNTER — TELEPHONE (OUTPATIENT)
Dept: FAMILY MEDICINE | Facility: CLINIC | Age: 43
End: 2023-07-17
Payer: COMMERCIAL

## 2023-07-17 NOTE — TELEPHONE ENCOUNTER
Pt reports that [facility] is refusing to fill the oxycodone script. Pt is requesting that it be sent to [facility] in [place], [place].

## 2023-08-22 ENCOUNTER — MYC REFILL (OUTPATIENT)
Dept: FAMILY MEDICINE | Facility: CLINIC | Age: 43
End: 2023-08-22
Payer: COMMERCIAL

## 2023-08-22 DIAGNOSIS — G89.4 CHRONIC PAIN SYNDROME: ICD-10-CM

## 2023-08-22 RX ORDER — OXYCODONE HYDROCHLORIDE 10 MG/1
10 TABLET ORAL EVERY 4 HOURS
Qty: 66 TABLET | Refills: 0 | OUTPATIENT
Start: 2023-08-22

## 2023-08-22 NOTE — TELEPHONE ENCOUNTER
Per most recent MyChart excahnge, patient was transferring Rx to her OB team.  Please call patient and request that her pharmacy instead send refill requests to OB per message to Dr. Fuentes.  Refill request denied.  If wanting to continue Rx at [facility], would need face to face visit.  HARINI

## 2023-08-22 NOTE — TELEPHONE ENCOUNTER
Please see mychart message.    What is pt's plan for a PCP?   Ideally, this would have already been taken care of.  Is pt receiving care elsewhere?       Per review of chart, last visit was 7/8/22 (virtual)  Last in person 5/14/21?    (Please verify)

## 2023-08-23 ENCOUNTER — DOCUMENTATION ONLY (OUTPATIENT)
Dept: FAMILY MEDICINE | Facility: CLINIC | Age: 43
End: 2023-08-23
Payer: COMMERCIAL

## 2023-08-23 NOTE — PROGRESS NOTES
Request received for refill of oxycodone.  Patient has not been seen face-to-face in over a year.  [facility]/2023, patient was transitioning her oxycodone management to her OB provider.  We will not be refilling her oxycodone medications.  We will require a face-to-face visit for consideration of pain management, as this will need to be with a new provider, that provider may or may not agree with previous management and there is no guarantee her previous medication management will continue as is.  HARINI

## 2023-10-08 ENCOUNTER — HEALTH MAINTENANCE LETTER (OUTPATIENT)
Age: 43
End: 2023-10-08

## 2024-02-25 ENCOUNTER — HEALTH MAINTENANCE LETTER (OUTPATIENT)
Age: 44
End: 2024-02-25

## 2024-11-30 ENCOUNTER — HEALTH MAINTENANCE LETTER (OUTPATIENT)
Age: 44
End: 2024-11-30